# Patient Record
Sex: MALE | NOT HISPANIC OR LATINO
[De-identification: names, ages, dates, MRNs, and addresses within clinical notes are randomized per-mention and may not be internally consistent; named-entity substitution may affect disease eponyms.]

---

## 2018-02-23 ENCOUNTER — TRANSCRIPTION ENCOUNTER (OUTPATIENT)
Age: 68
End: 2018-02-23

## 2018-04-05 ENCOUNTER — OUTPATIENT (OUTPATIENT)
Dept: OUTPATIENT SERVICES | Facility: HOSPITAL | Age: 68
LOS: 1 days | Discharge: HOME | End: 2018-04-05

## 2018-04-05 ENCOUNTER — RESULT REVIEW (OUTPATIENT)
Age: 68
End: 2018-04-05

## 2018-04-05 ENCOUNTER — APPOINTMENT (OUTPATIENT)
Dept: HEMATOLOGY ONCOLOGY | Facility: CLINIC | Age: 68
End: 2018-04-05
Payer: MEDICARE

## 2018-04-05 VITALS
OXYGEN SATURATION: 97 % | WEIGHT: 193.12 LBS | HEART RATE: 88 BPM | BODY MASS INDEX: 27.96 KG/M2 | HEIGHT: 69.61 IN | RESPIRATION RATE: 16 BRPM | DIASTOLIC BLOOD PRESSURE: 88 MMHG | SYSTOLIC BLOOD PRESSURE: 145 MMHG

## 2018-04-05 DIAGNOSIS — Z86.79 PERSONAL HISTORY OF OTHER DISEASES OF THE CIRCULATORY SYSTEM: ICD-10-CM

## 2018-04-05 DIAGNOSIS — Z80.3 FAMILY HISTORY OF MALIGNANT NEOPLASM OF BREAST: ICD-10-CM

## 2018-04-05 DIAGNOSIS — Z87.891 PERSONAL HISTORY OF NICOTINE DEPENDENCE: ICD-10-CM

## 2018-04-05 DIAGNOSIS — Z87.2 PERSONAL HISTORY OF DISEASES OF THE SKIN AND SUBCUTANEOUS TISSUE: ICD-10-CM

## 2018-04-05 DIAGNOSIS — R93.8 ABNORMAL FINDINGS ON DIAGNOSTIC IMAGING OF OTHER SPECIFIED BODY STRUCTURES: ICD-10-CM

## 2018-04-05 DIAGNOSIS — C18.9 MALIGNANT NEOPLASM OF COLON, UNSPECIFIED: ICD-10-CM

## 2018-04-05 DIAGNOSIS — Z86.39 PERSONAL HISTORY OF OTHER ENDOCRINE, NUTRITIONAL AND METABOLIC DISEASE: ICD-10-CM

## 2018-04-05 PROBLEM — Z00.00 ENCOUNTER FOR PREVENTIVE HEALTH EXAMINATION: Status: ACTIVE | Noted: 2018-04-05

## 2018-04-05 LAB
BASOPHILS # BLD AUTO: 0 K/UL — SIGNIFICANT CHANGE UP (ref 0–0.2)
BASOPHILS NFR BLD AUTO: 0.4 % — SIGNIFICANT CHANGE UP (ref 0–2)
EOSINOPHIL # BLD AUTO: 0.1 K/UL — SIGNIFICANT CHANGE UP (ref 0–0.5)
EOSINOPHIL NFR BLD AUTO: 0.9 % — SIGNIFICANT CHANGE UP (ref 0–6)
HCT VFR BLD CALC: 37.7 % — LOW (ref 39–50)
HGB BLD-MCNC: 12.7 G/DL — LOW (ref 13–17)
LYMPHOCYTES # BLD AUTO: 1.5 K/UL — SIGNIFICANT CHANGE UP (ref 1–3.3)
LYMPHOCYTES # BLD AUTO: 13.6 % — SIGNIFICANT CHANGE UP (ref 13–44)
MCHC RBC-ENTMCNC: 30.8 PG — SIGNIFICANT CHANGE UP (ref 27–34)
MCHC RBC-ENTMCNC: 33.8 G/DL — SIGNIFICANT CHANGE UP (ref 32–36)
MCV RBC AUTO: 91.2 FL — SIGNIFICANT CHANGE UP (ref 80–100)
MONOCYTES # BLD AUTO: 1.1 K/UL — HIGH (ref 0–0.9)
MONOCYTES NFR BLD AUTO: 9.7 % — SIGNIFICANT CHANGE UP (ref 2–14)
NEUTROPHILS # BLD AUTO: 8.4 K/UL — HIGH (ref 1.8–7.4)
NEUTROPHILS NFR BLD AUTO: 75.4 % — SIGNIFICANT CHANGE UP (ref 43–77)
PLATELET # BLD AUTO: 236 K/UL — SIGNIFICANT CHANGE UP (ref 150–400)
RBC # BLD: 4.13 M/UL — LOW (ref 4.2–5.8)
RBC # FLD: 12.5 % — SIGNIFICANT CHANGE UP (ref 10.3–14.5)
WBC # BLD: 11.1 K/UL — HIGH (ref 3.8–10.5)
WBC # FLD AUTO: 11.1 K/UL — HIGH (ref 3.8–10.5)

## 2018-04-05 PROCEDURE — 99205 OFFICE O/P NEW HI 60 MIN: CPT

## 2018-04-05 RX ORDER — ONDANSETRON 4 MG/1
4 TABLET ORAL
Qty: 120 | Refills: 0 | Status: ACTIVE | COMMUNITY
Start: 2018-04-05 | End: 1900-01-01

## 2018-04-05 RX ORDER — AMLODIPINE BESYLATE 5 MG/1
5 TABLET ORAL DAILY
Qty: 30 | Refills: 0 | Status: ACTIVE | COMMUNITY
Start: 2018-04-05

## 2018-04-05 RX ORDER — DOXAZOSIN 4 MG/1
4 TABLET ORAL DAILY
Qty: 30 | Refills: 0 | Status: ACTIVE | COMMUNITY
Start: 2018-04-05

## 2018-04-05 RX ORDER — METOPROLOL TARTRATE 25 MG/1
25 TABLET, FILM COATED ORAL TWICE DAILY
Qty: 60 | Refills: 0 | Status: ACTIVE | COMMUNITY
Start: 2018-04-05

## 2018-04-05 RX ORDER — ASPIRIN 81 MG/1
81 TABLET, CHEWABLE ORAL DAILY
Qty: 30 | Refills: 0 | Status: ACTIVE | COMMUNITY
Start: 2018-04-05

## 2018-04-05 RX ORDER — LOSARTAN POTASSIUM 50 MG/1
50 TABLET, FILM COATED ORAL DAILY
Qty: 30 | Refills: 0 | Status: ACTIVE | COMMUNITY
Start: 2018-04-05

## 2018-04-06 LAB
AFP-TM SERPL-MCNC: 1.3 NG/ML
APTT BLD: 31.3 SEC
HBV CORE IGG+IGM SER QL: NONREACTIVE
HBV CORE IGM SER QL: NONREACTIVE
HBV DNA # SERPL NAA+PROBE: NOT DETECTED IU/ML
HBV SURFACE AB SER QL: NONREACTIVE
HCV AB SER QL: NONREACTIVE
HCV S/CO RATIO: 0.17 S/CO
HEPB DNA PCR LOG: NOT DETECTED LOGIU/ML
INR PPP: 1.03 RATIO
PT BLD: 11.6 SEC

## 2018-04-09 PROBLEM — R93.8 ABNORMAL CHEST CT: Status: ACTIVE | Noted: 2018-04-05

## 2018-04-10 ENCOUNTER — APPOINTMENT (OUTPATIENT)
Dept: NUCLEAR MEDICINE | Facility: CLINIC | Age: 68
End: 2018-04-10

## 2018-04-10 ENCOUNTER — APPOINTMENT (OUTPATIENT)
Dept: CT IMAGING | Facility: CLINIC | Age: 68
End: 2018-04-10

## 2018-04-10 ENCOUNTER — OUTPATIENT (OUTPATIENT)
Dept: OUTPATIENT SERVICES | Facility: HOSPITAL | Age: 68
LOS: 1 days | End: 2018-04-10
Payer: MEDICARE

## 2018-04-10 DIAGNOSIS — Z00.8 ENCOUNTER FOR OTHER GENERAL EXAMINATION: ICD-10-CM

## 2018-04-10 LAB
HBV E AB SER QL: NEGATIVE
HBV E AG SER QL: NEGATIVE
HBV SURFACE AG SER QL: NONREACTIVE
HCV RNA SERPL NAA DL=5-ACNC: NOT DETECTED IU/ML
HCV RNA SERPL NAA+PROBE-LOG IU: NOT DETECTED LOGIU/ML

## 2018-04-10 PROCEDURE — 78815 PET IMAGE W/CT SKULL-THIGH: CPT | Mod: 26,PI

## 2018-04-10 PROCEDURE — A9552: CPT

## 2018-04-10 PROCEDURE — 74177 CT ABD & PELVIS W/CONTRAST: CPT | Mod: 26

## 2018-04-10 PROCEDURE — 78815 PET IMAGE W/CT SKULL-THIGH: CPT

## 2018-04-10 PROCEDURE — 74177 CT ABD & PELVIS W/CONTRAST: CPT

## 2018-04-10 PROCEDURE — 82565 ASSAY OF CREATININE: CPT

## 2018-04-14 ENCOUNTER — INPATIENT (INPATIENT)
Facility: HOSPITAL | Age: 68
LOS: 4 days | Discharge: HOME | DRG: 357 | End: 2018-04-19
Attending: INTERNAL MEDICINE | Admitting: INTERNAL MEDICINE
Payer: MEDICARE

## 2018-04-14 VITALS
TEMPERATURE: 98 F | DIASTOLIC BLOOD PRESSURE: 77 MMHG | WEIGHT: 195.11 LBS | HEART RATE: 79 BPM | SYSTOLIC BLOOD PRESSURE: 135 MMHG | RESPIRATION RATE: 16 BRPM | HEIGHT: 70 IN | OXYGEN SATURATION: 97 %

## 2018-04-14 DIAGNOSIS — Z29.9 ENCOUNTER FOR PROPHYLACTIC MEASURES, UNSPECIFIED: ICD-10-CM

## 2018-04-14 DIAGNOSIS — C18.9 MALIGNANT NEOPLASM OF COLON, UNSPECIFIED: ICD-10-CM

## 2018-04-14 DIAGNOSIS — I10 ESSENTIAL (PRIMARY) HYPERTENSION: ICD-10-CM

## 2018-04-14 DIAGNOSIS — Z98.890 OTHER SPECIFIED POSTPROCEDURAL STATES: Chronic | ICD-10-CM

## 2018-04-14 DIAGNOSIS — K92.2 GASTROINTESTINAL HEMORRHAGE, UNSPECIFIED: ICD-10-CM

## 2018-04-14 LAB
ALBUMIN SERPL ELPH-MCNC: 3.2 G/DL — LOW (ref 3.3–5)
ALP SERPL-CCNC: 329 U/L — HIGH (ref 40–120)
ALT FLD-CCNC: 52 U/L RC — HIGH (ref 10–45)
ANION GAP SERPL CALC-SCNC: 13 MMOL/L — SIGNIFICANT CHANGE UP (ref 5–17)
APTT BLD: 31.9 SEC — SIGNIFICANT CHANGE UP (ref 27.5–37.4)
AST SERPL-CCNC: 82 U/L — HIGH (ref 10–40)
BASOPHILS # BLD AUTO: 0 K/UL — SIGNIFICANT CHANGE UP (ref 0–0.2)
BASOPHILS NFR BLD AUTO: 0.3 % — SIGNIFICANT CHANGE UP (ref 0–2)
BILIRUB SERPL-MCNC: 0.6 MG/DL — SIGNIFICANT CHANGE UP (ref 0.2–1.2)
BLD GP AB SCN SERPL QL: NEGATIVE — SIGNIFICANT CHANGE UP
BUN SERPL-MCNC: 18 MG/DL — SIGNIFICANT CHANGE UP (ref 7–23)
CALCIUM SERPL-MCNC: 8.7 MG/DL — SIGNIFICANT CHANGE UP (ref 8.4–10.5)
CHLORIDE SERPL-SCNC: 100 MMOL/L — SIGNIFICANT CHANGE UP (ref 96–108)
CO2 SERPL-SCNC: 25 MMOL/L — SIGNIFICANT CHANGE UP (ref 22–31)
CREAT SERPL-MCNC: 0.9 MG/DL — SIGNIFICANT CHANGE UP (ref 0.5–1.3)
EOSINOPHIL # BLD AUTO: 0.1 K/UL — SIGNIFICANT CHANGE UP (ref 0–0.5)
EOSINOPHIL NFR BLD AUTO: 0.5 % — SIGNIFICANT CHANGE UP (ref 0–6)
GLUCOSE SERPL-MCNC: 172 MG/DL — HIGH (ref 70–99)
HCT VFR BLD CALC: 34.5 % — LOW (ref 39–50)
HCT VFR BLD CALC: 36.9 % — LOW (ref 39–50)
HGB BLD-MCNC: 11.3 G/DL — LOW (ref 13–17)
HGB BLD-MCNC: 11.9 G/DL — LOW (ref 13–17)
INR BLD: 1.13 RATIO — SIGNIFICANT CHANGE UP (ref 0.88–1.16)
LYMPHOCYTES # BLD AUTO: 1.5 K/UL — SIGNIFICANT CHANGE UP (ref 1–3.3)
LYMPHOCYTES # BLD AUTO: 11 % — LOW (ref 13–44)
MCHC RBC-ENTMCNC: 29.7 PG — SIGNIFICANT CHANGE UP (ref 27–34)
MCHC RBC-ENTMCNC: 30.1 PG — SIGNIFICANT CHANGE UP (ref 27–34)
MCHC RBC-ENTMCNC: 32.2 GM/DL — SIGNIFICANT CHANGE UP (ref 32–36)
MCHC RBC-ENTMCNC: 32.8 GM/DL — SIGNIFICANT CHANGE UP (ref 32–36)
MCV RBC AUTO: 91.7 FL — SIGNIFICANT CHANGE UP (ref 80–100)
MCV RBC AUTO: 92.3 FL — SIGNIFICANT CHANGE UP (ref 80–100)
MONOCYTES # BLD AUTO: 0.9 K/UL — SIGNIFICANT CHANGE UP (ref 0–0.9)
MONOCYTES NFR BLD AUTO: 6.6 % — SIGNIFICANT CHANGE UP (ref 2–14)
NEUTROPHILS # BLD AUTO: 10.9 K/UL — HIGH (ref 1.8–7.4)
NEUTROPHILS NFR BLD AUTO: 81.6 % — HIGH (ref 43–77)
PLATELET # BLD AUTO: 244 K/UL — SIGNIFICANT CHANGE UP (ref 150–400)
PLATELET # BLD AUTO: 256 K/UL — SIGNIFICANT CHANGE UP (ref 150–400)
POTASSIUM SERPL-MCNC: 4.6 MMOL/L — SIGNIFICANT CHANGE UP (ref 3.5–5.3)
POTASSIUM SERPL-SCNC: 4.6 MMOL/L — SIGNIFICANT CHANGE UP (ref 3.5–5.3)
PROT SERPL-MCNC: 6.4 G/DL — SIGNIFICANT CHANGE UP (ref 6–8.3)
PROTHROM AB SERPL-ACNC: 12.4 SEC — SIGNIFICANT CHANGE UP (ref 9.8–12.7)
RBC # BLD: 3.76 M/UL — LOW (ref 4.2–5.8)
RBC # BLD: 4 M/UL — LOW (ref 4.2–5.8)
RBC # FLD: 12.5 % — SIGNIFICANT CHANGE UP (ref 10.3–14.5)
RBC # FLD: 12.6 % — SIGNIFICANT CHANGE UP (ref 10.3–14.5)
RH IG SCN BLD-IMP: POSITIVE — SIGNIFICANT CHANGE UP
SODIUM SERPL-SCNC: 138 MMOL/L — SIGNIFICANT CHANGE UP (ref 135–145)
WBC # BLD: 12.8 K/UL — HIGH (ref 3.8–10.5)
WBC # BLD: 13.4 K/UL — HIGH (ref 3.8–10.5)
WBC # FLD AUTO: 12.8 K/UL — HIGH (ref 3.8–10.5)
WBC # FLD AUTO: 13.4 K/UL — HIGH (ref 3.8–10.5)

## 2018-04-14 PROCEDURE — 99285 EMERGENCY DEPT VISIT HI MDM: CPT | Mod: GC

## 2018-04-14 PROCEDURE — 99223 1ST HOSP IP/OBS HIGH 75: CPT

## 2018-04-14 PROCEDURE — 71045 X-RAY EXAM CHEST 1 VIEW: CPT | Mod: 26

## 2018-04-14 RX ORDER — METOPROLOL TARTRATE 50 MG
50 TABLET ORAL DAILY
Qty: 0 | Refills: 0 | Status: DISCONTINUED | OUTPATIENT
Start: 2018-04-14 | End: 2018-04-19

## 2018-04-14 RX ORDER — PANTOPRAZOLE SODIUM 20 MG/1
40 TABLET, DELAYED RELEASE ORAL EVERY 12 HOURS
Qty: 0 | Refills: 0 | Status: DISCONTINUED | OUTPATIENT
Start: 2018-04-14 | End: 2018-04-19

## 2018-04-14 RX ORDER — SODIUM CHLORIDE 9 MG/ML
1000 INJECTION INTRAMUSCULAR; INTRAVENOUS; SUBCUTANEOUS ONCE
Qty: 0 | Refills: 0 | Status: COMPLETED | OUTPATIENT
Start: 2018-04-14 | End: 2018-04-14

## 2018-04-14 RX ADMIN — SODIUM CHLORIDE 1000 MILLILITER(S): 9 INJECTION INTRAMUSCULAR; INTRAVENOUS; SUBCUTANEOUS at 14:03

## 2018-04-14 RX ADMIN — PANTOPRAZOLE SODIUM 40 MILLIGRAM(S): 20 TABLET, DELAYED RELEASE ORAL at 21:33

## 2018-04-14 NOTE — H&P ADULT - NSHPLABSRESULTS_GEN_ALL_CORE
Labs reviewed : Hb stable , leukocytosis, BMP is wnl, elevated transminases    CXR  personally reviewed : No focal consolidation    EKG personally reviewed :     CT Abd/pelvis 4/10/18: Colonic mass with extensive metastatic disease  BOWEL: No bowel obstruction. Sigmoid diverticulosis. Circumferential mass   at the splenic flexure corresponding with patient's primary neoplasm.   LIVER: Extensive bilateral lobar hepatic metastatic disease. A discrete   lesion in the right hepatic lobe (2:45) 4.4 x 3.8 cm.  LOWER CHEST: Pulmonary metastatic disease. A reference lesion in the   right lower lobe (2:22) 3.2 x 2.7 cm. Coronary calcification.    PET CT 4/10/18 :   1.  FDG avid heterogeneous mural thickening distal transverse   colon/splenic flexure probably represents primary malignancy.    2.  Widespread FDG avid hepatic lesions, retroperitoneal and mesenteric   lymphadenopathy and peritoneal nodules consistent with metastatic disease.    3.  Small FDG avid foci in the pelvic bones and ribs, suspicious for   osseous metastatic disease.    4.  FDG avid bilateral pulmonary nodules and mediastinal/hilar   lymphadenopathy, probably metastatic. Heterogeneous interstitial   prominence and groundglass opacities are nonspecific, possibly   inflammatory/infectious, however lymphangitic metastatic disease is   possible and incompletely evaluated due to nonbreath-hold technique. Labs reviewed : Hb stable , leukocytosis, BMP is wnl, elevated transaminases    CXR  personally reviewed : No focal consolidation    EKG personally reviewed : NSR, no acute ischemic changes    CT Abd/pelvis 4/10/18: Colonic mass with extensive metastatic disease  BOWEL: No bowel obstruction. Sigmoid diverticulosis. Circumferential mass   at the splenic flexure corresponding with patient's primary neoplasm.   LIVER: Extensive bilateral lobar hepatic metastatic disease. A discrete   lesion in the right hepatic lobe (2:45) 4.4 x 3.8 cm.  LOWER CHEST: Pulmonary metastatic disease. A reference lesion in the   right lower lobe (2:22) 3.2 x 2.7 cm. Coronary calcification.    PET CT 4/10/18 :   1.  FDG avid heterogeneous mural thickening distal transverse   colon/splenic flexure probably represents primary malignancy.    2.  Widespread FDG avid hepatic lesions, retroperitoneal and mesenteric   lymphadenopathy and peritoneal nodules consistent with metastatic disease.    3.  Small FDG avid foci in the pelvic bones and ribs, suspicious for   osseous metastatic disease.    4.  FDG avid bilateral pulmonary nodules and mediastinal/hilar   lymphadenopathy, probably metastatic. Heterogeneous interstitial   prominence and groundglass opacities are nonspecific, possibly   inflammatory/infectious, however lymphangitic metastatic disease is   possible and incompletely evaluated due to nonbreath-hold technique.

## 2018-04-14 NOTE — ED ADULT NURSE NOTE - OBJECTIVE STATEMENT
Patient presents to Ed with c/o bloody stools. Pt reports diagnosis of malignant mass in colon and has been experiencing  bloody stools x 3 days. Pt A&Ox3 denies pain, no chest pain sob nausea vomiting +bloody stool, no fever chills headache  or dizziness. Lungs cta bilat, abd soft non tender non distended, skin warm dry intact.

## 2018-04-14 NOTE — H&P ADULT - PROBLEM SELECTOR PLAN 3
pt is on Toprol XL 50mg qd, Doxazosin 4mg qpm, Losartan 50mg qd at home  will start Toprol XL for now  Hold other BP meds pt is on Toprol XL 50mg qd, Doxazosin 4mg qpm, Losartan 50mg qd at home  will start Toprol XL for now  Hold other BP meds in light of GI bleed

## 2018-04-14 NOTE — ED PROVIDER NOTE - MEDICAL DECISION MAKING DETAILS
Pt with metastatic mass colon with gi bleedding. Check labs./hct,vbg/guiac probable admit  Kedar Roberts MD, Facep

## 2018-04-14 NOTE — H&P ADULT - ASSESSMENT
67M with h/o HTN, HLD, Psoriasis , recently diagnosed metastatic Colon CA who presents with c/o bright red blood per rectum since yesterday 67M with h/o HTN, HLD, Psoriasis , recently diagnosed metastatic Colon CA who presents with c/o bright red blood per rectum since yesterday. Physical exam notable for abd bloating. Labs are notable for  stable Hb , leukocytosis and elevated transminases. Recent outpatient CT Abd/Pelvis and PET CT show colonic mass with extensive metastatic disease. 67M with h/o HTN, HLD, Psoriasis , recently diagnosed metastatic Colon CA who presents with c/o bright red blood per rectum since yesterday. Physical exam notable for abd bloating. Labs are notable for  stable Hb , leukocytosis and elevated transaminases. Recent outpatient CT Abd/Pelvis and PET CT show colonic mass with extensive metastatic disease.

## 2018-04-14 NOTE — ED PROVIDER NOTE - OBJECTIVE STATEMENT
68 y/o M w/ hx of HTN and Hyperchol, recently diagnosed colonic mass suggestive of colon CA w/ biopsy confirming diagnosis p/w blood stools, 3 episodes since yesterday. No abdom pain, n/v, fever, or chills. No dizziness, CP, SOB, or palpitations.   primary care physician: [facility]

## 2018-04-14 NOTE — ED PROVIDER NOTE - ATTENDING CONTRIBUTION TO CARE
Private Physician Luis (onc),  [facility]  67y male pmh HTN, Had ct done for complains of cough/abd pain. CT mass in upper colon., Pet scan 5d ago confirmed mass, chest/liver/colon. PT now comes to ed complains of rectal bleeding, Diarreha with blood in bowl twice since last night. Spoke to pmd and referred to ed. Private Physician Luis (onc),  [facility]  67y male pmh HTN, Had ct done for complains of cough/abd pain. CT mass in upper colon., Pet scan 5d ago confirmed mass, chest/liver/colon. PT now comes to ed complains of rectal bleeding, Diarreha with blood in bowl twice since last night. Spoke to pmd and referred to ed. Pe WDWN male nad normocephalic atraumatic chest clear  anterior & posterior abd soft +bs cv no rmg, neuro no focal defects.  Kedar Roberts MD, Facep

## 2018-04-14 NOTE — ED PROVIDER NOTE - PROGRESS NOTE DETAILS
GI called for consult Patient admitted by prior physician.  CXR still outstanding; now performed, shows no consolidation.  ECG also performed (ordered by inpatient team), and shows NSR without st elevations. -Sumeet

## 2018-04-14 NOTE — H&P ADULT - PROBLEM SELECTOR PLAN 2
recently diagnosed  with extensive metastatic disease. recently diagnosed  with extensive metastatic disease.  Hem- Onc consult in AM

## 2018-04-14 NOTE — H&P ADULT - HISTORY OF PRESENT ILLNESS
67M with h/o HTN, HLD, Psoriasis , recently diagnosed metastatic Colon CA who presents with c/o bright red blood per rectum since yesterday. Pt reports that he had a bowel movement in the morning with bright red blood mixed with stool. The second bowel movement consisted of stool only but the third was stool with joe blood again. He had another episode this morning though smaller in quantity. He denies melena, hematemesis, abd pain , nausea, diarrhea. He does c/o constipation abd bloating. He denies NSAID use. Pt also denies CP, SOB, dizziness, palpitations.  Of note pt was recently diagnosed with colon CA with diffuse pulmonary and hepatic metastasis.     ED course  VS : 135/77  79  16 O2 97% on room air T 97.5F  Labs : h/h 11.9/36.9 ( h/h 12.7/37.7 on April 5) wbc 13.4  bun/cr 18/0.9    AST 82  ALT 52  Treatment : IVF NS 1L x 1

## 2018-04-14 NOTE — H&P ADULT - PROBLEM SELECTOR PLAN 1
pt with bright red blood per rectum  Hb stable   CBC q 6  Type and screen  start Protonix 40mg IVP q12  GI consult called

## 2018-04-15 LAB
HCT VFR BLD CALC: 35.6 % — LOW (ref 39–50)
HCT VFR BLD CALC: 38.5 % — LOW (ref 39–50)
HGB BLD-MCNC: 11.6 G/DL — LOW (ref 13–17)
HGB BLD-MCNC: 13 G/DL — SIGNIFICANT CHANGE UP (ref 13–17)
MCHC RBC-ENTMCNC: 28.9 PG — SIGNIFICANT CHANGE UP (ref 27–34)
MCHC RBC-ENTMCNC: 30.8 PG — SIGNIFICANT CHANGE UP (ref 27–34)
MCHC RBC-ENTMCNC: 32.6 GM/DL — SIGNIFICANT CHANGE UP (ref 32–36)
MCHC RBC-ENTMCNC: 33.6 GM/DL — SIGNIFICANT CHANGE UP (ref 32–36)
MCV RBC AUTO: 88.8 FL — SIGNIFICANT CHANGE UP (ref 80–100)
MCV RBC AUTO: 91.5 FL — SIGNIFICANT CHANGE UP (ref 80–100)
PLATELET # BLD AUTO: 255 K/UL — SIGNIFICANT CHANGE UP (ref 150–400)
PLATELET # BLD AUTO: 273 K/UL — SIGNIFICANT CHANGE UP (ref 150–400)
RBC # BLD: 4.01 M/UL — LOW (ref 4.2–5.8)
RBC # BLD: 4.21 M/UL — SIGNIFICANT CHANGE UP (ref 4.2–5.8)
RBC # FLD: 12.6 % — SIGNIFICANT CHANGE UP (ref 10.3–14.5)
RBC # FLD: 14.2 % — SIGNIFICANT CHANGE UP (ref 10.3–14.5)
WBC # BLD: 13.31 K/UL — HIGH (ref 3.8–10.5)
WBC # BLD: 14.3 K/UL — HIGH (ref 3.8–10.5)
WBC # FLD AUTO: 13.31 K/UL — HIGH (ref 3.8–10.5)
WBC # FLD AUTO: 14.3 K/UL — HIGH (ref 3.8–10.5)

## 2018-04-15 PROCEDURE — 99222 1ST HOSP IP/OBS MODERATE 55: CPT | Mod: GC

## 2018-04-15 PROCEDURE — 99233 SBSQ HOSP IP/OBS HIGH 50: CPT

## 2018-04-15 PROCEDURE — 99223 1ST HOSP IP/OBS HIGH 75: CPT | Mod: GC

## 2018-04-15 RX ORDER — SOD SULF/SODIUM/NAHCO3/KCL/PEG
1000 SOLUTION, RECONSTITUTED, ORAL ORAL
Qty: 0 | Refills: 0 | Status: COMPLETED | OUTPATIENT
Start: 2018-04-15 | End: 2018-04-15

## 2018-04-15 RX ADMIN — Medication 1000 MILLILITER(S): at 20:28

## 2018-04-15 RX ADMIN — PANTOPRAZOLE SODIUM 40 MILLIGRAM(S): 20 TABLET, DELAYED RELEASE ORAL at 17:28

## 2018-04-15 RX ADMIN — PANTOPRAZOLE SODIUM 40 MILLIGRAM(S): 20 TABLET, DELAYED RELEASE ORAL at 05:56

## 2018-04-15 RX ADMIN — Medication 50 MILLIGRAM(S): at 05:56

## 2018-04-15 RX ADMIN — Medication 1000 MILLILITER(S): at 17:27

## 2018-04-15 NOTE — PROGRESS NOTE ADULT - PROBLEM SELECTOR PLAN 2
recently diagnosed  with extensive metastatic disease.  Hem- Onc consult in AM recently diagnosed  with extensive metastatic disease.  Hem- Onc consult pending

## 2018-04-15 NOTE — PROGRESS NOTE ADULT - PROBLEM SELECTOR PLAN 3
pt is on Toprol XL 50mg qd, Doxazosin 4mg qpm, Losartan 50mg qd at home  will start Toprol XL for now  Hold other BP meds in light of GI bleed BP well controlled   c/w Toprol XL 50mg PO qd  Hold other BP meds in light of GI bleed

## 2018-04-15 NOTE — CONSULT NOTE ADULT - ASSESSMENT
67 year old male with HTN, HLD, psoriasis, recently diagnosed metastatic colon carcinoma presented to the emergency room with chief complain of bright red blood per rectum for one day.     Impression:   - Hematochezia: likely 2/2 colon cancer  - Metastatic colon cancer, unknown pathology    Plan:  - trend CBC, CMP  - plan for flexible sigmoidoscopy on Monday 4/16  - please give two tap water enemas in am on 4/16  - clear liquid diet today, NPO after midnight   - recommend oncology consult  - supportive care as per primary team    GI team will continue to follow.  Thank you for the consult. 67 year old male with HTN, HLD, psoriasis, recently diagnosed metastatic colon carcinoma presented to the emergency room with chief complain of bright red blood per rectum for one day.     Impression:   - Hematochezia: likely 2/2 colon cancer  - Metastatic colon cancer, unknown pathology  - Elevated liver enzymes, likely 2/2 metastatic lesions in the liver     Plan:  - trend CBC, CMP  - plan for flexible sigmoidoscopy on Monday 4/16  - please give two tap water enemas in am on 4/16  - clear liquid diet today, NPO after midnight   - recommend oncology consult  - supportive care as per primary team    GI team will continue to follow.  Thank you for the consult. 67 year old male with HTN, HLD, psoriasis, recently diagnosed metastatic colon carcinoma presented to the emergency room with chief complain of bright red blood per rectum for one day.     Impression:   - Hematochezia: likely 2/2 colon cancer  - Metastatic colon cancer, unknown pathology  - Elevated liver enzymes, likely 2/2 metastatic lesions in the liver     Plan:  - trend CBC, CMP  - Plan for colonoscopy tomorrow, with 2 L Moviprep (we will write)  - clear liquid diet today, NPO after midnight   - recommend oncology consult  - supportive care as per primary team    GI team will continue to follow.  Thank you for the consult. 67 year old male with HTN, HLD, psoriasis, recently diagnosed colon mass, along with liver and lung masses (dx by imaging CT PET, mets to lung, liver with likely primary colon mass) presented to the emergency room with chief complain of bright red blood per rectum for one day.     Impression:   - Hematochezia: likely 2/2 colon mass  - Metastatic colon cancer, unknown pathology  - Elevated liver enzymes, likely 2/2 metastatic lesions in the liver     Plan:  - trend CBC, CMP  - Plan for colonoscopy tomorrow, with 2 L Moviprep (we will write)  - clear liquid diet today, NPO after midnight   - recommend oncology consult  - supportive care as per primary team    GI team will continue to follow.  Thank you for the consult.

## 2018-04-15 NOTE — PROGRESS NOTE ADULT - ASSESSMENT
67M with h/o HTN, HLD, Psoriasis , recently diagnosed metastatic Colon CA who presents with c/o bright red blood per rectum since yesterday. Physical exam notable for abd bloating. Labs are notable for  stable Hb , leukocytosis and elevated transaminases. Recent outpatient CT Abd/Pelvis and PET CT show colonic mass with extensive metastatic disease.

## 2018-04-15 NOTE — PROGRESS NOTE ADULT - PROBLEM SELECTOR PLAN 1
pt with bright red blood per rectum  Hb stable   CBC q 6  Type and screen  start Protonix 40mg IVP q12  GI consult called Hb stable   CBC q 12  c/w Protonix 40mg IVP q12  GI consult pending

## 2018-04-15 NOTE — CONSULT NOTE ADULT - SUBJECTIVE AND OBJECTIVE BOX
Chief Complaint:  Patient is a 67y old  Male who presents with a chief complaint of bright red blood per rectum (14 Apr 2018 16:15)      HPI:  67 year old male with HTN, HLD, psoriasis, recently diagnosed metastatic colon carcinoma presented to the emergency room with chief complain of bright red blood per rectum for one day.     As per the patient and his wife, he had a bowel movement in the morning yesterday with bright red blood mixed with stool. The second bowel movement consisted of stool only but the third was stool with joe blood again. He denies melena, hematemesis, abdominal pain, nausea, diarrhea. He does usually have constipation bloating. He denies NSAID use. Pt also denies CP, SOB, dizziness, palpitations.    Of note pt was recently diagnosed with colon CA with diffuse pulmonary and hepatic metastasis. He has not had a colonoscopy.       Allergies:  sulfa drugs (Unknown)      Home Medications:   Outpatient Medication Status not yet specified    Hospital Medications:  metoprolol succinate ER 50 milliGRAM(s) Oral daily  pantoprazole  Injectable 40 milliGRAM(s) IV Push every 12 hours      PMHX/PSHX:  Malignant neoplasm of colon, unspecified part of colon  HTN (hypertension)  History of tonsillectomy      Family history:  Family history of breast cancer in mother (Mother)      Social History:   	Lives with spouse  	Daily alcohol use  	Former smoker   Denies illicit drugs    ROS:     General:  No wt loss, fevers, chills, night sweats, fatigue,   Eyes:  Good vision, no reported pain  ENT:  No sore throat, pain, runny nose, dysphagia  CV:  No pain, palpitations, hypo/hypertension  Resp:  No dyspnea, cough, tachypnea, wheezing  GI:  See HPI  :  No pain, bleeding, incontinence, nocturia  Muscle:  No pain, weakness  Neuro:  No weakness, tingling, memory problems  Psych:  No fatigue, insomnia, mood problems, depression  Endocrine:  No polyuria, polydipsia, cold/heat intolerance  Heme:  No petechiae, ecchymosis, easy bruisability  Skin:  No rash, edema      PHYSICAL EXAM:     GENERAL:  Appears stated age, well-groomed, well-nourished, no distress  HEENT:  NC/AT,  conjunctivae clear and pink,  no JVD  CHEST:  Full & symmetric excursion, no increased effort, breath sounds clear  HEART:  Regular rhythm, S1, S2, no murmur/rub/S3/S4, no abdominal bruit, no edema  ABDOMEN:  Soft, non-tender, non-distended, normoactive bowel sounds,  no masses ,  EXTREMITIES:  no cyanosis,clubbing or edema  SKIN:  No rash/erythema/ecchymoses/petechiae/wounds/abscess/warm/dry  NEURO:  Alert, oriented    Vital Signs:  Vital Signs Last 24 Hrs  T(C): 37.3 (15 Apr 2018 04:29), Max: 37.3 (15 Apr 2018 04:29)  T(F): 99.1 (15 Apr 2018 04:29), Max: 99.1 (15 Apr 2018 04:29)  HR: 85 (15 Apr 2018 04:29) (73 - 89)  BP: 155/86 (15 Apr 2018 04:29) (135/77 - 158/87)  BP(mean): 96 (14 Apr 2018 16:15) (96 - 96)  RR: 18 (15 Apr 2018 04:29) (16 - 18)  SpO2: 95% (15 Apr 2018 04:29) (94% - 97%)  Daily Height in cm: 177.8 (14 Apr 2018 18:35)    Daily     LABS:                        11.6   13.31 )-----------( 255      ( 15 Apr 2018 09:15 )             35.6     04-14    138  |  100  |  18  ----------------------------<  172<H>  4.6   |  25  |  0.90    Ca    8.7      14 Apr 2018 14:16    TPro  6.4  /  Alb  3.2<L>  /  TBili  0.6  /  DBili  x   /  AST  82<H>  /  ALT  52<H>  /  AlkPhos  329<H>  04-14    LIVER FUNCTIONS - ( 14 Apr 2018 14:16 )  Alb: 3.2 g/dL / Pro: 6.4 g/dL / ALK PHOS: 329 U/L / ALT: 52 U/L RC / AST: 82 U/L / GGT: x           PT/INR - ( 14 Apr 2018 14:16 )   PT: 12.4 sec;   INR: 1.13 ratio         PTT - ( 14 Apr 2018 14:16 )  PTT:31.9 sec        Imaging:  < from: CT Abdomen and Pelvis w/ Oral Cont and w/ IV Cont (04.10.18 @ 15:37) >  LOWER CHEST: Pulmonary metastatic disease. A reference lesion in the   right lower lobe (2:22) 3.2 x 2.7 cm. Coronary calcification.    LIVER: Extensive bilateral lobar hepatic metastatic disease. A discrete   lesion in the right hepatic lobe (2:45) 4.4 x 3.8 cm.  BILE DUCTS: Normal caliber.  GALLBLADDER: Contracted.  SPLEEN: Within normal limits.  PANCREAS: Within normal limits.  ADRENALS: Within normal limits.  KIDNEYS/URETERS: Within normal limits.    BLADDER: Within normal limits.  REPRODUCTIVE ORGANS:Enlarged prostate.    BOWEL: No bowel obstruction. Sigmoid diverticulosis. Circumferential mass   at the splenic flexure corresponding with patient's primary neoplasm.   Appendix is normal.  PERITONEUM: Trace ascites.  VESSELS:  Atherosclerotic changes.  RETROPERITONEUM: Extensive retroperitoneal adenopathy. A reference left   para-aortic node (2:44) 3.1 x 2.3 cm    ABDOMINAL WALL: Within normal limits.  BONES: Degenerative changes of the spine. Bilateral hip a vascular   necrosis. No evidence ofcollapse.    IMPRESSION:     Colonic mass with extensive metastatic disease as above.      < end of copied text >      < from: NM PET/CT Onc FDG Skull to Thigh, Inital (04.10.18 @ 15:33) >  IMPRESSION:      1.  FDG avid heterogeneous mural thickening distal transverse   colon/splenic flexure probably represents primary malignancy.    2.  Widespread FDG avid hepatic lesions, retroperitoneal and mesenteric   lymphadenopathy and peritoneal nodules consistent with metastatic disease.    3.  Small FDG avid foci in the pelvic bones and ribs, suspicious for   osseous metastatic disease.    4.  FDG avid bilateral pulmonary nodules and mediastinal/hilar   lymphadenopathy, probably metastatic. Heterogeneous interstitial   prominence and groundglass opacities are nonspecific, possibly   inflammatory/infectious, however lymphangitic metastatic disease is   possible and incompletely evaluated due to nonbreath-hold technique.            < end of copied text > Chief Complaint:  Patient is a 67y old  Male who presents with a chief complaint of bright red blood per rectum (14 Apr 2018 16:15)      HPI:  67 year old male with HTN, HLD, psoriasis, recently diagnosed colon mass, along with liver and lung masses (dx by imaging CT PET, mets to lung, liver with likely primary colon mass) presented to the emergency room with chief complain of bright red blood per rectum for one day.     As per the patient and his wife, he had a bowel movement in the morning yesterday with bright red blood mixed with stool. The second bowel movement consisted of stool only but the third was stool with joe blood again. He denies melena, hematemesis, abdominal pain, nausea, diarrhea. He does usually have constipation bloating. He denies NSAID use. Pt also denies CP, SOB, dizziness, palpitations.  Over the past 6 months, pt has weight loss, appetite suppression, left sided abdominal pains.  There has been a change in stool habits with new constipation. Over the past week, patient has been BRBPR.      Family history negative for colon CA.        Allergies:  sulfa drugs (Unknown)      Home Medications:   Outpatient Medication Status not yet specified    Hospital Medications:  metoprolol succinate ER 50 milliGRAM(s) Oral daily  pantoprazole  Injectable 40 milliGRAM(s) IV Push every 12 hours      PMHX/PSHX:  Malignant neoplasm of colon, unspecified part of colon  HTN (hypertension)  History of tonsillectomy      Family history:  Family history of breast cancer in mother (Mother)      Social History:   	Lives with spouse  	Daily alcohol use  	Former smoker   Denies illicit drugs    ROS:     General:  No wt loss, fevers, chills, night sweats, fatigue,   Eyes:  Good vision, no reported pain  ENT:  No sore throat, pain, runny nose, dysphagia  CV:  No pain, palpitations, hypo/hypertension  Resp:  No dyspnea, cough, tachypnea, wheezing  GI:  See HPI  :  No pain, bleeding, incontinence, nocturia  Muscle:  No pain, weakness  Neuro:  No weakness, tingling, memory problems  Psych:  No fatigue, insomnia, mood problems, depression  Endocrine:  No polyuria, polydipsia, cold/heat intolerance  Heme:  No petechiae, ecchymosis, easy bruisability  Skin:  No rash, edema      PHYSICAL EXAM:     GENERAL:  Appears stated age, well-groomed, well-nourished, no distress  HEENT:  NC/AT,  conjunctivae clear and pink,  no JVD  CHEST:  Full & symmetric excursion, no increased effort, breath sounds clear  HEART:  Regular rhythm, S1, S2, no murmur/rub/S3/S4, no abdominal bruit, no edema  ABDOMEN:  Soft, non-tender, non-distended, normoactive bowel sounds,  no masses ,  EXTREMITIES:  no cyanosis,clubbing or edema  SKIN:  No rash/erythema/ecchymoses/petechiae/wounds/abscess/warm/dry  NEURO:  Alert, oriented    Vital Signs:  Vital Signs Last 24 Hrs  T(C): 37.3 (15 Apr 2018 04:29), Max: 37.3 (15 Apr 2018 04:29)  T(F): 99.1 (15 Apr 2018 04:29), Max: 99.1 (15 Apr 2018 04:29)  HR: 85 (15 Apr 2018 04:29) (73 - 89)  BP: 155/86 (15 Apr 2018 04:29) (135/77 - 158/87)  BP(mean): 96 (14 Apr 2018 16:15) (96 - 96)  RR: 18 (15 Apr 2018 04:29) (16 - 18)  SpO2: 95% (15 Apr 2018 04:29) (94% - 97%)  Daily Height in cm: 177.8 (14 Apr 2018 18:35)    Daily     LABS:                        11.6   13.31 )-----------( 255      ( 15 Apr 2018 09:15 )             35.6     04-14    138  |  100  |  18  ----------------------------<  172<H>  4.6   |  25  |  0.90    Ca    8.7      14 Apr 2018 14:16    TPro  6.4  /  Alb  3.2<L>  /  TBili  0.6  /  DBili  x   /  AST  82<H>  /  ALT  52<H>  /  AlkPhos  329<H>  04-14    LIVER FUNCTIONS - ( 14 Apr 2018 14:16 )  Alb: 3.2 g/dL / Pro: 6.4 g/dL / ALK PHOS: 329 U/L / ALT: 52 U/L RC / AST: 82 U/L / GGT: x           PT/INR - ( 14 Apr 2018 14:16 )   PT: 12.4 sec;   INR: 1.13 ratio         PTT - ( 14 Apr 2018 14:16 )  PTT:31.9 sec        Imaging:  < from: CT Abdomen and Pelvis w/ Oral Cont and w/ IV Cont (04.10.18 @ 15:37) >  LOWER CHEST: Pulmonary metastatic disease. A reference lesion in the   right lower lobe (2:22) 3.2 x 2.7 cm. Coronary calcification.    LIVER: Extensive bilateral lobar hepatic metastatic disease. A discrete   lesion in the right hepatic lobe (2:45) 4.4 x 3.8 cm.  BILE DUCTS: Normal caliber.  GALLBLADDER: Contracted.  SPLEEN: Within normal limits.  PANCREAS: Within normal limits.  ADRENALS: Within normal limits.  KIDNEYS/URETERS: Within normal limits.    BLADDER: Within normal limits.  REPRODUCTIVE ORGANS:Enlarged prostate.    BOWEL: No bowel obstruction. Sigmoid diverticulosis. Circumferential mass   at the splenic flexure corresponding with patient's primary neoplasm.   Appendix is normal.  PERITONEUM: Trace ascites.  VESSELS:  Atherosclerotic changes.  RETROPERITONEUM: Extensive retroperitoneal adenopathy. A reference left   para-aortic node (2:44) 3.1 x 2.3 cm    ABDOMINAL WALL: Within normal limits.  BONES: Degenerative changes of the spine. Bilateral hip a vascular   necrosis. No evidence ofcollapse.    IMPRESSION:     Colonic mass with extensive metastatic disease as above.      < end of copied text >      < from: NM PET/CT Onc FDG Skull to Thigh, Inital (04.10.18 @ 15:33) >  IMPRESSION:      1.  FDG avid heterogeneous mural thickening distal transverse   colon/splenic flexure probably represents primary malignancy.    2.  Widespread FDG avid hepatic lesions, retroperitoneal and mesenteric   lymphadenopathy and peritoneal nodules consistent with metastatic disease.    3.  Small FDG avid foci in the pelvic bones and ribs, suspicious for   osseous metastatic disease.    4.  FDG avid bilateral pulmonary nodules and mediastinal/hilar   lymphadenopathy, probably metastatic. Heterogeneous interstitial   prominence and groundglass opacities are nonspecific, possibly   inflammatory/infectious, however lymphangitic metastatic disease is   possible and incompletely evaluated due to nonbreath-hold technique.            < end of copied text >

## 2018-04-15 NOTE — CONSULT NOTE ADULT - ATTENDING COMMENTS
Patient interviewed/examined.  Agree with history, ROS, PE, A/P as above.    Patient with colon mass and liver masses, likely metastatic colon cancer causing LGIB.    For colonoscopy tomorrow.    Would do anemia work-up to evaluate for iron-deficiency.      Amor Pope MD   [phone]
I have seen and examined this patient with the GI fellow. Agree with above.

## 2018-04-15 NOTE — CONSULT NOTE ADULT - SUBJECTIVE AND OBJECTIVE BOX
Oncology Consult Note    HPI:  67M with h/o HTN, HLD, Psoriasis , recently diagnosed metastatic Colon CA who presents with c/o bright red blood per rectum for the past 2-3 days. Pt reports that he had a bowel movement in the morning with bright red blood mixed with stool. The second bowel movement consisted of stool only but the third was stool with joe blood again. He had another episode this morning though smaller in quantity. He denies melena, hematemesis, abd pain , nausea, diarrhea. He does c/o constipation abd bloating. He denies NSAID use. Pt also denies CP, SOB, dizziness, palpitations.  Of note pt was recently diagnosed with colon CA with diffuse pulmonary and hepatic metastasis. He has not yet had biopsy to confirm pathology.     Allergies    sulfa drugs (Unknown)    Intolerances        MEDICATIONS  (STANDING):  metoprolol succinate ER 50 milliGRAM(s) Oral daily  pantoprazole  Injectable 40 milliGRAM(s) IV Push every 12 hours  polyethylene glycol/electrolyte Solution 1000 milliLiter(s) Oral every 2 hours    MEDICATIONS  (PRN):      PAST MEDICAL & SURGICAL HISTORY:  Malignant neoplasm of colon, unspecified part of colon  HTN (hypertension)  History of tonsillectomy      FAMILY HISTORY:  Family history of breast and stomach cancer in mother (Mother)      SOCIAL HISTORY: former smoker and former alcohol use    REVIEW OF SYSTEMS:    CONSTITUTIONAL: No weakness, fevers or chills  EYES/ENT: No visual changes;  No vertigo or throat pain   NECK: No pain or stiffness  RESPIRATORY: No cough, wheezing, hemoptysis; No shortness of breath  CARDIOVASCULAR: No chest pain or palpitations  GASTROINTESTINAL: + blood in stool. No abdominal or epigastric pain. No nausea, vomiting, or hematemesis; No diarrhea or constipation.   GENITOURINARY: No dysuria, frequency or hematuria  NEUROLOGICAL: No numbness or weakness  SKIN: No itching, burning, rashes, or lesions   All other review of systems is negative unless indicated above.    Height (cm): 177.8 (04-14 @ 18:35)  Weight (kg): 88.5 (04-14 @ 18:35)  BMI (kg/m2): 28 (04-14 @ 18:35)  BSA (m2): 2.07 (04-14 @ 18:35)    T(F): 98.1 (04-15-18 @ 11:54), Max: 99.1 (04-15-18 @ 04:29)  HR: 78 (04-15-18 @ 11:54)  BP: 146/85 (04-15-18 @ 11:54)  RR: 18 (04-15-18 @ 11:54)  SpO2: 95% (04-15-18 @ 11:54)  Wt(kg): --    GENERAL: NAD, well-developed  HEAD:  Atraumatic, Normocephalic  EYES: EOMI, PERRLA, conjunctiva and sclera clear  NECK: Supple, No JVD  CHEST/LUNG: Clear to auscultation bilaterally; No wheeze  HEART: Regular rate and rhythm; No murmurs, rubs, or gallops  ABDOMEN: Soft, Nontender, Nondistended; Bowel sounds present  EXTREMITIES:  2+ Peripheral Pulses, No clubbing, cyanosis, or edema  NEUROLOGY: non-focal  SKIN: No rashes or lesions                          11.6   13.31 )-----------( 255      ( 15 Apr 2018 09:15 )             35.6       04-14    138  |  100  |  18  ----------------------------<  172<H>  4.6   |  25  |  0.90    Ca    8.7      14 Apr 2018 14:16    TPro  6.4  /  Alb  3.2<L>  /  TBili  0.6  /  DBili  x   /  AST  82<H>  /  ALT  52<H>  /  AlkPhos  329<H>  04-14

## 2018-04-15 NOTE — CONSULT NOTE ADULT - ASSESSMENT
67M with h/o HTN, HLD, Psoriasis and being worked up for metastatic colon cancer who presents with c/o bright red blood per rectum for the past 2-3 days:    #Bright red blood per rectum:  - 2/2 underlying malignancy, patient to go for colonoscopy tomorrow  - pt has not had biopsy yet to confirm pathology of suspected malignancy, appreciate biopsy by GI when they perform the colonoscopy  - if biopsy is unobtainable by colonoscopy for some reason, patient does have large liver metastases that could be biopsied by core needle  - hgb currently stable, bleeding has slowed down as per patient  - outpatient follow-up with Dr. Casey to discuss biopsy results and treatment plan 67M with h/o HTN, HLD, Psoriasis and being worked up for metastatic colon cancer who presents with c/o bright red blood per rectum for the past 2-3 days:    #Bright red blood per rectum:  - 2/2 underlying malignancy, patient to go for colonoscopy tomorrow  - pt has not had biopsy yet to confirm pathology of suspected malignancy, appreciate biopsy by GI when they perform the colonoscopy  - as per patient's oncologist, Dr. Casey, please also arrange for core needle biopsy of liver metastasis to which additional molecular studies can be done for possible target-based therapies.   - hgb currently stable, bleeding has slowed down as per patient  - outpatient follow-up with Dr. Casey to discuss biopsy results and treatment plan

## 2018-04-15 NOTE — CONSULT NOTE ADULT - SUBJECTIVE AND OBJECTIVE BOX
SURGICAL ONCOLOGY- [place] TEAM [id number]    CC: Patient is a 67y old male who presents with a chief complaint of bright red blood per rectum (14 Apr 2018 16:15)    Patient is a 67 year old male with HTN, HLD, and recently diagnosed metastatic colon cancer admitted for bright red blood per rectum x 3 days. Pt reports that he had a bowel movement in the morning with bright red blood mixed with stool, no clots. Later that day, he had a bowel movement described as skinny with blood mixed in. The patient presented to the ED where he had another bloody bowel movement. He denies any CP, SOB, abd pain, nausea, vomiting, or diarrhea.     The patient reports that over the past 4 months, he had been sick with the flu and then pneumonia. Reports having some weight loss that was at first intentional, then unintentional. He continue to have fatigue after the pneumonia resolved, with decreased po intake for 2-3 months, and also a change in his bowel habits, having alternating diarrhea and constipation with bloating. Colonoscopy was postponed due to severe bloating and endoscopy was also canceled due to persistent cough. Patient was then referred by PCP to pulmonology, Dr. Julian Steward. Chest CT was ordered, which revealed evidence of the cancer. The patient has since had a PET CT 4/10/2018 showing uptake in the distal transverse colon/splenic flexure, liver, lungs, pelvic bones and ribs, RP and mesenteric LNs.      PMH  Malignant neoplasm of colon, unspecified part of colon  HTN (hypertension)  HLD    PSH  History of tonsillectomy  Cardiac cath 12/2014 @ [facility] for chest pain, negative    MEDS  aspirin 81 mg oral tablet: 1 tab(s) orally once a day (14 Apr 2018 21:21)  doxazosin 4 mg oral tablet: 1 tab(s) orally once a day (in the evening) (14 Apr 2018 21:21)  losartan 100 mg oral tablet: 0.5 tab(s)= 50mg orally once a day in the evening (14 Apr 2018 21:21)  Metoprolol Succinate  mg oral tablet, extended release: 0.5 tab(s)= 50mg  orally once a day in the morning (14 Apr 2018 21:21)  Milk of Magnesia 8% oral suspension: 30 milliliter(s) orally , As Needed (14 Apr 2018 21:21)  rosuvastatin 10 mg oral tablet: 1 tab(s) orally once a day (at bedtime) (14 Apr 2018 21:21)    Allergies  sulfa drugs (Unknown)  Intolerances      Social  Former tobacco, quit 10/2017, 2 cigs/day x 20 years  Former EtOH, 2-3 drinks/day x 30 years, quit in 11/2017  Lives with wife  Still works as private practice architect      Physical Exam  T(C): 37.3 (04-15-18 @ 04:29), Max: 37.3 (04-15-18 @ 04:29)  HR: 85 (04-15-18 @ 04:29) (73 - 89)  BP: 155/86 (04-15-18 @ 04:29) (135/77 - 158/87)  RR: 18 (04-15-18 @ 04:29) (16 - 18)  SpO2: 95% (04-15-18 @ 04:29) (94% - 97%)  Tmax: T(C): , Max: 37.3 (04-15-18 @ 04:29)      Gen: NAD  HEENT: normocephalic, atraumatic, no scleral icterus  CV: S1, S2, RRR  Pulm: CTA B/L  Abd: Soft, ND, NTP, no rebound, no guarding, no palpable organomegaly/masses  Ext: warm, no edema      04-14-18  -  04-15-18  --------------------------------------------------------  IN:    Oral Fluid: 340 mL  Total IN: 340 mL    OUT:  Total OUT: 0 mL    Total NET: 340 mL          Labs:                        11.6   13.31 )-----------( 255      ( 15 Apr 2018 09:15 )             35.6     04-14    138  |  100  |  18  ----------------------------<  172<H>  4.6   |  25  |  0.90    Ca    8.7      14 Apr 2018 14:16    TPro  6.4  /  Alb  3.2<L>  /  TBili  0.6  /  DBili  x   /  AST  82<H>  /  ALT  52<H>  /  AlkPhos  329<H>  04-14    PT/INR - ( 14 Apr 2018 14:16 )   PT: 12.4 sec;   INR: 1.13 ratio         PTT - ( 14 Apr 2018 14:16 )  PTT:31.9 sec          Imaging  < from: NM PET/CT Onc FDG Skull to Thigh, Inital (04.10.18 @ 15:33) >  FINDINGS:      HEAD/NECK: Physiologic FDG activity seen in the visualized portions of   the brain. FDG avid left supraclavicular lymph nodes as a reference 1.7 x   1.1 cm (SUV 18.5; image 71).    THORAX:  Mediastinal and hilar lymphadenopathy. As a reference,   prevascular lymph node, 2.1 x 1.5 cm (SUV 14.7; image 95) and difficult   to delineate right hilar lymph node (SUV 8.6; image 105) No abnormal   avidity.Physiologic FDG activity in the myocardiumand blood pool.      LUNGS: Multiple bilateral pulmonary nodules, the largest of which is in   the right lower lobe measuring 3.1 x 2.5 cm with central photopenia   suggestive of necrosis (SUV 14.3; image 131). Nodule in the left lower   lobe, 1.6 x 1.2 cm (SUV 21.6; image 129). Multiple areas of groundglass   attenuation and interstitial thickening.    PLEURA/PERICARDIUM: No abnormal avidity. No pericardial or pleural   effusions.    HEPATOBILIARY/PANCREAS: Widespread conglomerate FDG avidity throughout   the liver replacing nearly the entire liver parenchyma, difficult to   measure. An area in the posterior segment of the right lobe (SUV 17.3;   image 151) and in the left hepatic lobe (SUV 19.9; image 144).    SPLEEN: No abnormal avidity.    ADRENAL GLANDS: No abnormal avidity.    KIDNEYS/URINARY BLADDER: Excreted activity is seen.   No abnormal avidity.    ABDOMINOPELVIC NODES:   Multiple retroperitoneal, portal and celiac axis   lymph nodes. As a reference, a left periaortic lymph node deep to the   left renal vein, 3.1 x 2.2 cm (SUV 20.1; image 170). Mesenteric lymph   nodes also identified for example in the small bowel mesentery 1.5 x 1.6   cm (SUV 11.3; image 185)    BOWEL/PERITONEUM/MESENTERY:  Heterogeneous mural thickening in the distal   transverse colon/splenic flexure (SUV 16.9; image 166). Sigmoid   diverticulosis.    Peritoneal nodule adjacent to the transverse colonic mass, 1.7 x 1.5 cm   (SUV 5.0; image 189).    PELVIC ORGANS: No abnormal avidity.    BONES:  Scattered FDG avid osseous foci for example, a marrow lesion in   the right iliac bone (SUV 14.5; image 221) and in the left anterior   second rib (SUV 9.1; image 90). Several additional foci in the pelvic   bones are also suspicious.    SOFT TISSUES:  Noabnormal avidity.    IMPRESSION:      1.  FDG avid heterogeneous mural thickening distal transverse   colon/splenic flexure probably represents primary malignancy.    2.  Widespread FDG avid hepatic lesions, retroperitoneal and mesenteric   lymphadenopathy and peritoneal nodules consistent with metastatic disease.    3.  Small FDG avid foci in the pelvic bones and ribs, suspicious for   osseous metastatic disease.    4.  FDG avid bilateral pulmonary nodules and mediastinal/hilar   lymphadenopathy, probably metastatic. Heterogeneous interstitial   prominence and groundglass opacities are nonspecific, possibly   inflammatory/infectious, however lymphangitic metastatic disease is   possible and incompletely evaluated due to nonbreath-hold technique.      < end of copied text > SURGICAL ONCOLOGY- [place] TEAM [id number]    CC: Patient is a 67y old male who presents with a chief complaint of bright red blood per rectum (14 Apr 2018 16:15)    Patient is a 67 year old male with HTN, HLD, and recently diagnosed metastatic colon cancer admitted for bright red blood per rectum x 3 days. Pt reports that he had a bowel movement in the morning with bright red blood mixed with stool, no clots. Later that day, he had a bowel movement described as skinny with blood mixed in. The patient presented to the ED where he had another bloody bowel movement. He denies any CP, SOB, abd pain, nausea, vomiting, or diarrhea.     The patient reports that over the past 4 months, he had been sick with the flu and then pneumonia. Reports having some weight loss that was at first intentional, then unintentional. He continue to have fatigue after the pneumonia resolved, with decreased po intake for 2-3 months, and also a change in his bowel habits, having alternating diarrhea and constipation with bloating. Colonoscopy was postponed due to severe bloating and endoscopy was also canceled due to persistent cough. Patient was then referred by PCP to pulmonology, Dr. Julian Steward. Chest CT was ordered, which revealed evidence of the cancer. The patient has since had a PET CT 4/10/2018 showing uptake in the distal transverse colon/splenic flexure, liver, lungs, pelvic bones and ribs, RP and mesenteric LNs.      PMH  Malignant neoplasm of colon, unspecified part of colon  HTN (hypertension)  HLD    PSH  History of tonsillectomy  Cardiac cath 12/2014 @ [facility] for chest pain, negative    Family History  Mother with breast and gastric cancer      MEDS  aspirin 81 mg oral tablet: 1 tab(s) orally once a day (14 Apr 2018 21:21)  doxazosin 4 mg oral tablet: 1 tab(s) orally once a day (in the evening) (14 Apr 2018 21:21)  losartan 100 mg oral tablet: 0.5 tab(s)= 50mg orally once a day in the evening (14 Apr 2018 21:21)  Metoprolol Succinate  mg oral tablet, extended release: 0.5 tab(s)= 50mg  orally once a day in the morning (14 Apr 2018 21:21)  Milk of Magnesia 8% oral suspension: 30 milliliter(s) orally , As Needed (14 Apr 2018 21:21)  rosuvastatin 10 mg oral tablet: 1 tab(s) orally once a day (at bedtime) (14 Apr 2018 21:21)    Allergies  sulfa drugs (Unknown)  Intolerances      Social  Former tobacco, quit 10/2017, 2 cigs/day x 20 years  Former EtOH, 2-3 drinks/day x 30 years, quit in 11/2017  Lives with wife  Still works as private practice architect      Physical Exam  T(C): 37.3 (04-15-18 @ 04:29), Max: 37.3 (04-15-18 @ 04:29)  HR: 85 (04-15-18 @ 04:29) (73 - 89)  BP: 155/86 (04-15-18 @ 04:29) (135/77 - 158/87)  RR: 18 (04-15-18 @ 04:29) (16 - 18)  SpO2: 95% (04-15-18 @ 04:29) (94% - 97%)  Tmax: T(C): , Max: 37.3 (04-15-18 @ 04:29)      Gen: NAD  HEENT: normocephalic, atraumatic, no scleral icterus  CV: S1, S2, RRR  Pulm: CTA B/L  Abd: Soft, ND, NTP, no rebound, no guarding, no palpable organomegaly/masses  Ext: warm, no edema      04-14-18  -  04-15-18  --------------------------------------------------------  IN:    Oral Fluid: 340 mL  Total IN: 340 mL    OUT:  Total OUT: 0 mL    Total NET: 340 mL          Labs:                        11.6   13.31 )-----------( 255      ( 15 Apr 2018 09:15 )             35.6     04-14    138  |  100  |  18  ----------------------------<  172<H>  4.6   |  25  |  0.90    Ca    8.7      14 Apr 2018 14:16    TPro  6.4  /  Alb  3.2<L>  /  TBili  0.6  /  DBili  x   /  AST  82<H>  /  ALT  52<H>  /  AlkPhos  329<H>  04-14    PT/INR - ( 14 Apr 2018 14:16 )   PT: 12.4 sec;   INR: 1.13 ratio         PTT - ( 14 Apr 2018 14:16 )  PTT:31.9 sec          Imaging  < from: NM PET/CT Onc FDG Skull to Thigh, Inital (04.10.18 @ 15:33) >  FINDINGS:      HEAD/NECK: Physiologic FDG activity seen in the visualized portions of   the brain. FDG avid left supraclavicular lymph nodes as a reference 1.7 x   1.1 cm (SUV 18.5; image 71).    THORAX:  Mediastinal and hilar lymphadenopathy. As a reference,   prevascular lymph node, 2.1 x 1.5 cm (SUV 14.7; image 95) and difficult   to delineate right hilar lymph node (SUV 8.6; image 105) No abnormal   avidity.Physiologic FDG activity in the myocardiumand blood pool.      LUNGS: Multiple bilateral pulmonary nodules, the largest of which is in   the right lower lobe measuring 3.1 x 2.5 cm with central photopenia   suggestive of necrosis (SUV 14.3; image 131). Nodule in the left lower   lobe, 1.6 x 1.2 cm (SUV 21.6; image 129). Multiple areas of groundglass   attenuation and interstitial thickening.    PLEURA/PERICARDIUM: No abnormal avidity. No pericardial or pleural   effusions.    HEPATOBILIARY/PANCREAS: Widespread conglomerate FDG avidity throughout   the liver replacing nearly the entire liver parenchyma, difficult to   measure. An area in the posterior segment of the right lobe (SUV 17.3;   image 151) and in the left hepatic lobe (SUV 19.9; image 144).    SPLEEN: No abnormal avidity.    ADRENAL GLANDS: No abnormal avidity.    KIDNEYS/URINARY BLADDER: Excreted activity is seen.   No abnormal avidity.    ABDOMINOPELVIC NODES:   Multiple retroperitoneal, portal and celiac axis   lymph nodes. As a reference, a left periaortic lymph node deep to the   left renal vein, 3.1 x 2.2 cm (SUV 20.1; image 170). Mesenteric lymph   nodes also identified for example in the small bowel mesentery 1.5 x 1.6   cm (SUV 11.3; image 185)    BOWEL/PERITONEUM/MESENTERY:  Heterogeneous mural thickening in the distal   transverse colon/splenic flexure (SUV 16.9; image 166). Sigmoid   diverticulosis.    Peritoneal nodule adjacent to the transverse colonic mass, 1.7 x 1.5 cm   (SUV 5.0; image 189).    PELVIC ORGANS: No abnormal avidity.    BONES:  Scattered FDG avid osseous foci for example, a marrow lesion in   the right iliac bone (SUV 14.5; image 221) and in the left anterior   second rib (SUV 9.1; image 90). Several additional foci in the pelvic   bones are also suspicious.    SOFT TISSUES:  Noabnormal avidity.    IMPRESSION:      1.  FDG avid heterogeneous mural thickening distal transverse   colon/splenic flexure probably represents primary malignancy.    2.  Widespread FDG avid hepatic lesions, retroperitoneal and mesenteric   lymphadenopathy and peritoneal nodules consistent with metastatic disease.    3.  Small FDG avid foci in the pelvic bones and ribs, suspicious for   osseous metastatic disease.    4.  FDG avid bilateral pulmonary nodules and mediastinal/hilar   lymphadenopathy, probably metastatic. Heterogeneous interstitial   prominence and groundglass opacities are nonspecific, possibly   inflammatory/infectious, however lymphangitic metastatic disease is   possible and incompletely evaluated due to nonbreath-hold technique.      < end of copied text >

## 2018-04-15 NOTE — CONSULT NOTE ADULT - ASSESSMENT
67 year old male with newly diagnosed colon cancer with mets to the lung and liver, here with bright red blood per rectum, pt hemodynamically stable  - would recommend US guided liver biopsy for tissue diagnosis  - suspect slow bleed from the tumor  - continue to trend H/H, transfuse as needed  - favor endoscopic intervention for bleeding over surgery if needed  - discussed with attg Yecenia Sumner   Surgical Oncology  [facility] [id number]

## 2018-04-15 NOTE — PROGRESS NOTE ADULT - SUBJECTIVE AND OBJECTIVE BOX
Patient is a 67y old  Male who presents with a chief complaint of bright red blood per rectum (14 Apr 2018 16:15)      SUBJECTIVE / OVERNIGHT EVENTS:    MEDICATIONS  (STANDING):  metoprolol succinate ER 50 milliGRAM(s) Oral daily  pantoprazole  Injectable 40 milliGRAM(s) IV Push every 12 hours    MEDICATIONS  (PRN):      Vital Signs Last 24 Hrs  T(C): 37.3 (15 Apr 2018 04:29), Max: 37.3 (15 Apr 2018 04:29)  T(F): 99.1 (15 Apr 2018 04:29), Max: 99.1 (15 Apr 2018 04:29)  HR: 85 (15 Apr 2018 04:29) (73 - 89)  BP: 155/86 (15 Apr 2018 04:29) (135/77 - 158/87)  BP(mean): 96 (14 Apr 2018 16:15) (96 - 96)  RR: 18 (15 Apr 2018 04:29) (16 - 18)  SpO2: 95% (15 Apr 2018 04:29) (94% - 97%)  CAPILLARY BLOOD GLUCOSE        I&O's Summary    14 Apr 2018 07:01  -  15 Apr 2018 07:00  --------------------------------------------------------  IN: 340 mL / OUT: 0 mL / NET: 340 mL        PHYSICAL EXAM:  GENERAL: NAD, well-developed  HEAD:  Atraumatic, Normocephalic  EYES: EOMI, PERRLA, conjunctiva and sclera clear  NECK: Supple, No JVD  CHEST/LUNG: Clear to auscultation bilaterally; No wheeze  HEART: Regular rate and rhythm; No murmurs, rubs, or gallops  ABDOMEN: Soft, Nontender, Nondistended; Bowel sounds present  EXTREMITIES:  2+ Peripheral Pulses, No clubbing, cyanosis, or edema  PSYCH: AAOx3  NEUROLOGY: non-focal  SKIN: No rashes or lesions    LABS:                        11.3   12.8  )-----------( 244      ( 14 Apr 2018 22:43 )             34.5     04-14    138  |  100  |  18  ----------------------------<  172<H>  4.6   |  25  |  0.90    Ca    8.7      14 Apr 2018 14:16    TPro  6.4  /  Alb  3.2<L>  /  TBili  0.6  /  DBili  x   /  AST  82<H>  /  ALT  52<H>  /  AlkPhos  329<H>  04-14    PT/INR - ( 14 Apr 2018 14:16 )   PT: 12.4 sec;   INR: 1.13 ratio         PTT - ( 14 Apr 2018 14:16 )  PTT:31.9 sec          RADIOLOGY & ADDITIONAL TESTS:    Imaging Personally Reviewed:    Consultant(s) Notes Reviewed:      Care Discussed with Consultants/Other Providers: Patient is a 67y old  Male who presents with a chief complaint of bright red blood per rectum (14 Apr 2018 16:15)      SUBJECTIVE / OVERNIGHT EVENTS: Pt seen and examined with wife at bedside. He reports no acute events overnight. Had a bowel movement this morning with minimal blood.  Denies abd pain, n/v, CP, SOB, dizziness, palpitations.    MEDICATIONS  (STANDING):  metoprolol succinate ER 50 milliGRAM(s) Oral daily  pantoprazole  Injectable 40 milliGRAM(s) IV Push every 12 hours    MEDICATIONS  (PRN):      Vital Signs Last 24 Hrs  T(C): 37.3 (15 Apr 2018 04:29), Max: 37.3 (15 Apr 2018 04:29)  T(F): 99.1 (15 Apr 2018 04:29), Max: 99.1 (15 Apr 2018 04:29)  HR: 85 (15 Apr 2018 04:29) (73 - 89)  BP: 155/86 (15 Apr 2018 04:29) (135/77 - 158/87)  BP(mean): 96 (14 Apr 2018 16:15) (96 - 96)  RR: 18 (15 Apr 2018 04:29) (16 - 18)  SpO2: 95% (15 Apr 2018 04:29) (94% - 97%)  CAPILLARY BLOOD GLUCOSE        I&O's Summary    14 Apr 2018 07:01  -  15 Apr 2018 07:00  --------------------------------------------------------  IN: 340 mL / OUT: 0 mL / NET: 340 mL        PHYSICAL EXAM:  GENERAL: NAD,anicteric, afebrile to touch  HEAD:  Atraumatic, Normocephalic  EYES: EOMI, PERRLA, conjunctiva and sclera clear  NECK: Supple, No JVD  CHEST/LUNG: Clear to auscultation bilaterally; No wheeze  HEART: Regular rate and rhythm; No murmurs, rubs, or gallops  ABDOMEN: Soft, Nontender, Nondistended; Bowel sounds present  EXTREMITIES:  2+ Peripheral Pulses, No clubbing, cyanosis, or edema  PSYCH: AAOx3  NEUROLOGY: non-focal  SKIN: No rashes or lesions    LABS:                        11.3   12.8  )-----------( 244      ( 14 Apr 2018 22:43 )             34.5     04-14    138  |  100  |  18  ----------------------------<  172<H>  4.6   |  25  |  0.90    Ca    8.7      14 Apr 2018 14:16    TPro  6.4  /  Alb  3.2<L>  /  TBili  0.6  /  DBili  x   /  AST  82<H>  /  ALT  52<H>  /  AlkPhos  329<H>  04-14    PT/INR - ( 14 Apr 2018 14:16 )   PT: 12.4 sec;   INR: 1.13 ratio         PTT - ( 14 Apr 2018 14:16 )  PTT:31.9 sec          RADIOLOGY & ADDITIONAL TESTS:    Imaging Personally Reviewed:    Consultant(s) Notes Reviewed:      Care Discussed with Consultants/Other Providers:

## 2018-04-16 ENCOUNTER — RESULT REVIEW (OUTPATIENT)
Age: 68
End: 2018-04-16

## 2018-04-16 DIAGNOSIS — K56.609 UNSPECIFIED INTESTINAL OBSTRUCTION, UNSPECIFIED AS TO PARTIAL VERSUS COMPLETE OBSTRUCTION: ICD-10-CM

## 2018-04-16 LAB
HCT VFR BLD CALC: 37.9 % — LOW (ref 39–50)
HCT VFR BLD CALC: 40.2 % — SIGNIFICANT CHANGE UP (ref 39–50)
HGB BLD-MCNC: 12.6 G/DL — LOW (ref 13–17)
HGB BLD-MCNC: 13.1 G/DL — SIGNIFICANT CHANGE UP (ref 13–17)
MCHC RBC-ENTMCNC: 29.7 PG — SIGNIFICANT CHANGE UP (ref 27–34)
MCHC RBC-ENTMCNC: 30.3 PG — SIGNIFICANT CHANGE UP (ref 27–34)
MCHC RBC-ENTMCNC: 32.7 GM/DL — SIGNIFICANT CHANGE UP (ref 32–36)
MCHC RBC-ENTMCNC: 33.3 GM/DL — SIGNIFICANT CHANGE UP (ref 32–36)
MCV RBC AUTO: 90.8 FL — SIGNIFICANT CHANGE UP (ref 80–100)
MCV RBC AUTO: 91 FL — SIGNIFICANT CHANGE UP (ref 80–100)
PLATELET # BLD AUTO: 267 K/UL — SIGNIFICANT CHANGE UP (ref 150–400)
PLATELET # BLD AUTO: 287 K/UL — SIGNIFICANT CHANGE UP (ref 150–400)
RBC # BLD: 4.17 M/UL — LOW (ref 4.2–5.8)
RBC # BLD: 4.43 M/UL — SIGNIFICANT CHANGE UP (ref 4.2–5.8)
RBC # FLD: 12.6 % — SIGNIFICANT CHANGE UP (ref 10.3–14.5)
RBC # FLD: 12.6 % — SIGNIFICANT CHANGE UP (ref 10.3–14.5)
WBC # BLD: 13.2 K/UL — HIGH (ref 3.8–10.5)
WBC # BLD: 14 K/UL — HIGH (ref 3.8–10.5)
WBC # FLD AUTO: 13.2 K/UL — HIGH (ref 3.8–10.5)
WBC # FLD AUTO: 14 K/UL — HIGH (ref 3.8–10.5)

## 2018-04-16 PROCEDURE — 99232 SBSQ HOSP IP/OBS MODERATE 35: CPT

## 2018-04-16 PROCEDURE — 45331 SIGMOIDOSCOPY AND BIOPSY: CPT | Mod: GC

## 2018-04-16 PROCEDURE — 88305 TISSUE EXAM BY PATHOLOGIST: CPT | Mod: 26

## 2018-04-16 PROCEDURE — 99233 SBSQ HOSP IP/OBS HIGH 50: CPT

## 2018-04-16 RX ORDER — LOSARTAN POTASSIUM 100 MG/1
50 TABLET, FILM COATED ORAL DAILY
Qty: 0 | Refills: 0 | Status: DISCONTINUED | OUTPATIENT
Start: 2018-04-16 | End: 2018-04-19

## 2018-04-16 RX ADMIN — PANTOPRAZOLE SODIUM 40 MILLIGRAM(S): 20 TABLET, DELAYED RELEASE ORAL at 05:48

## 2018-04-16 RX ADMIN — Medication 50 MILLIGRAM(S): at 05:48

## 2018-04-16 RX ADMIN — PANTOPRAZOLE SODIUM 40 MILLIGRAM(S): 20 TABLET, DELAYED RELEASE ORAL at 17:28

## 2018-04-16 RX ADMIN — LOSARTAN POTASSIUM 50 MILLIGRAM(S): 100 TABLET, FILM COATED ORAL at 17:28

## 2018-04-16 NOTE — PROGRESS NOTE ADULT - PROBLEM SELECTOR PLAN 2
recently diagnosed, with extensive metastatic disease.  Spoke with Dr Casey from Oncology- he is agreeable to a colonic stent placement by GI.     He is also requesting a liver met bx for [facility] testing- called Dr Kedar Arellano from /s- he will try to do the biopsy today or tomorrow- order placed.

## 2018-04-16 NOTE — PROGRESS NOTE ADULT - PROBLEM SELECTOR PLAN 1
Awaiting biopsy results. He will also have a biopsy of the liver. Management will be discussed after biopsy results become available.

## 2018-04-16 NOTE — PROGRESS NOTE ADULT - SUBJECTIVE AND OBJECTIVE BOX
66 y/o man with newly diagnosed colon cancer with mets to the lung, liver, bones and retroperitoneal LN, now admitted with bright red blood per rectum. Formal diagnostic not established yet, he had a biopsy of the colonic mass today.    INTERVAL HPI/OVERNIGHT EVENTS:  Patient S&E at bedside. No o/n events,     VITAL SIGNS:  T(F): 97.9 (04-16-18 @ 08:06)  HR: 80 (04-16-18 @ 08:06)  BP: 144/88 (04-16-18 @ 08:06)  RR: 18 (04-16-18 @ 08:06)  SpO2: 93% (04-16-18 @ 08:06)  Wt(kg): --    PHYSICAL EXAM:    Constitutional: NAD  Eyes: EOMI, sclera non-icteric  Neck: supple, no masses, no JVD  Respiratory: CTA b/l, good air entry b/l  Cardiovascular: RRR, no M/R/G  Gastrointestinal: soft, NTND, no masses palpable, + BS, no hepatosplenomegaly  Extremities: no c/c/e  Neurological: AAOx3      MEDICATIONS  (STANDING):  losartan 50 milliGRAM(s) Oral daily  metoprolol succinate ER 50 milliGRAM(s) Oral daily  pantoprazole  Injectable 40 milliGRAM(s) IV Push every 12 hours    MEDICATIONS  (PRN):      Allergies    sulfa drugs (Unknown)    Intolerances        LABS:                        13.1   14.0  )-----------( 287      ( 16 Apr 2018 07:55 )             40.2                 RADIOLOGY & ADDITIONAL TESTS:  Studies reviewed.

## 2018-04-16 NOTE — CHART NOTE - NSCHARTNOTEFT_GEN_A_CORE
PROCEDURE NOTE:    COLONOSCOPY:  - near obstructing circumferential mass at the splenic flexure, biopsied  - unable to traverse the mass with the colonoscope  - sigmoid diverticulosis    PLAN:  - clear liquid diet, advance to full liquids as tolerated  - f/u biopsies  - Heme Onc recommendations  - Will need discussion options including palliative colon resection vs palliative colonic stent for colonic obstruction

## 2018-04-16 NOTE — PROGRESS NOTE ADULT - SUBJECTIVE AND OBJECTIVE BOX
Patient is a 67y old  Male who presents with a chief complaint of GI hemorrhage (16 Apr 2018 08:20)    HPI: Colonoscopy today revealed a near obstructing circumferential mass at the splenic flexure, biopsied - unable to traverse the mass with the colonoscope. Pt feels well, requesting diet.    Vital Signs Last 24 Hrs  T(C): 36.6 (16 Apr 2018 08:06), Max: 37.2 (15 Apr 2018 17:44)  T(F): 97.9 (16 Apr 2018 08:06), Max: 99 (15 Apr 2018 17:44)  HR: 80 (16 Apr 2018 08:06) (77 - 86)  BP: 144/88 (16 Apr 2018 08:06) (144/88 - 163/91)  BP(mean): --  RR: 18 (16 Apr 2018 08:06) (18 - 20)  SpO2: 93% (16 Apr 2018 08:06) (93% - 94%)                          13.1   14.0  )-----------( 287      ( 16 Apr 2018 07:55 )             40.2     04-14    138  |  100  |  18  ----------------------------<  172<H>  4.6   |  25  |  0.90    Ca    8.7      14 Apr 2018 14:16    TPro  6.4  /  Alb  3.2<L>  /  TBili  0.6  /  DBili  x   /  AST  82<H>  /  ALT  52<H>  /  AlkPhos  329<H>  04-14

## 2018-04-16 NOTE — PROGRESS NOTE ADULT - ASSESSMENT
67M with recently diagnosed metastatic Colon CA aw rectal bleeding. Colonoscopy today revealed near obstructing circumferential mass at the splenic flexure, biopsied- unable to traverse the mass with the colonoscope

## 2018-04-16 NOTE — PROGRESS NOTE ADULT - SUBJECTIVE AND OBJECTIVE BOX
SURGICAL ONCOLOGY PROGRESS NOTE      SUBJECTIVE  Pt seen and evaluated this AM. No acute events overnight. Pt reports that he was able to complete the bowel prep for the colonoscopy today with multiple "orange tinged" bowel movements with the prep. Pain currently well controlled. Pt denies fevers, chills, nausea, vomiting, constipation, lightheadedness, chest pain, SOB.      OBJECTIVE    MEDICATIONS  metoprolol succinate ER 50 milliGRAM(s) Oral daily  pantoprazole  Injectable 40 milliGRAM(s) IV Push every 12 hours      PHYSICAL EXAM  T(C): 36.6 (04-16-18 @ 08:06), Max: 37.2 (04-15-18 @ 17:44)  HR: 80 (04-16-18 @ 08:06) (77 - 86)  BP: 144/88 (04-16-18 @ 08:06) (144/88 - 163/91)  RR: 18 (04-16-18 @ 08:06) (18 - 20)  SpO2: 93% (04-16-18 @ 08:06) (93% - 94%)    04-16-18 @ 07:01  -  04-16-18 @ 13:35  --------------------------------------------------------  IN: 0 mL / OUT: 0 mL / NET: 0 mL      General: Pt in bed, NAD  Neuro: AAOx3, No acute focal motor or sensory deficits  CHEST: Nonlabored respirations, equal rib excursion bilaterally  CV: S1 and S2  Abdomen: Soft, nontender, nondistended, no rebound or guarding  Extremities: No edema, cyanosis, or clubbing    LABS                        13.1   14.0  )-----------( 287      ( 16 Apr 2018 07:55 )             40.2     04-14    138  |  100  |  18  ----------------------------<  172<H>  4.6   |  25  |  0.90    Ca    8.7      14 Apr 2018 14:16    TPro  6.4  /  Alb  3.2<L>  /  TBili  0.6  /  DBili  x   /  AST  82<H>  /  ALT  52<H>  /  AlkPhos  329<H>  04-14    PT/INR - ( 14 Apr 2018 14:16 )   PT: 12.4 sec;   INR: 1.13 ratio         PTT - ( 14 Apr 2018 14:16 )  PTT:31.9 sec      RADIOLOGY & ADDITIONAL STUDIES      ASSESSMENT/PLAN  67 year old male with newly diagnosed colon cancer with mets to the lung and liver, now admitted with bright red blood per rectum  - Colonoscopy demonstrates a nearly obstructing circumferential mass at the splenic flexure. Biopsy results pending.  - Consider US guided liver biopsy  - Trend H/H, transfuse as needed  - Favor endoscopic intervention for bleeding or obstruction over surgery if needed  - Discussed with Dr. Keene

## 2018-04-17 ENCOUNTER — APPOINTMENT (OUTPATIENT)
Age: 68
End: 2018-04-17

## 2018-04-17 LAB
ANION GAP SERPL CALC-SCNC: 16 MMOL/L — SIGNIFICANT CHANGE UP (ref 5–17)
BUN SERPL-MCNC: 16 MG/DL — SIGNIFICANT CHANGE UP (ref 7–23)
CALCIUM SERPL-MCNC: 8.7 MG/DL — SIGNIFICANT CHANGE UP (ref 8.4–10.5)
CHLORIDE SERPL-SCNC: 101 MMOL/L — SIGNIFICANT CHANGE UP (ref 96–108)
CO2 SERPL-SCNC: 21 MMOL/L — LOW (ref 22–31)
CREAT SERPL-MCNC: 0.93 MG/DL — SIGNIFICANT CHANGE UP (ref 0.5–1.3)
GLUCOSE SERPL-MCNC: 105 MG/DL — HIGH (ref 70–99)
HCT VFR BLD CALC: 35.8 % — LOW (ref 39–50)
HCT VFR BLD CALC: 37.3 % — LOW (ref 39–50)
HGB BLD-MCNC: 12 G/DL — LOW (ref 13–17)
HGB BLD-MCNC: 12.5 G/DL — LOW (ref 13–17)
MCHC RBC-ENTMCNC: 30.5 PG — SIGNIFICANT CHANGE UP (ref 27–34)
MCHC RBC-ENTMCNC: 30.5 PG — SIGNIFICANT CHANGE UP (ref 27–34)
MCHC RBC-ENTMCNC: 33.5 GM/DL — SIGNIFICANT CHANGE UP (ref 32–36)
MCHC RBC-ENTMCNC: 33.7 GM/DL — SIGNIFICANT CHANGE UP (ref 32–36)
MCV RBC AUTO: 90.6 FL — SIGNIFICANT CHANGE UP (ref 80–100)
MCV RBC AUTO: 90.8 FL — SIGNIFICANT CHANGE UP (ref 80–100)
PLATELET # BLD AUTO: 218 K/UL — SIGNIFICANT CHANGE UP (ref 150–400)
PLATELET # BLD AUTO: 223 K/UL — SIGNIFICANT CHANGE UP (ref 150–400)
POTASSIUM SERPL-MCNC: 4.2 MMOL/L — SIGNIFICANT CHANGE UP (ref 3.5–5.3)
POTASSIUM SERPL-SCNC: 4.2 MMOL/L — SIGNIFICANT CHANGE UP (ref 3.5–5.3)
RBC # BLD: 3.95 M/UL — LOW (ref 4.2–5.8)
RBC # BLD: 4.1 M/UL — LOW (ref 4.2–5.8)
RBC # FLD: 12.5 % — SIGNIFICANT CHANGE UP (ref 10.3–14.5)
RBC # FLD: 12.5 % — SIGNIFICANT CHANGE UP (ref 10.3–14.5)
SODIUM SERPL-SCNC: 138 MMOL/L — SIGNIFICANT CHANGE UP (ref 135–145)
WBC # BLD: 11.6 K/UL — HIGH (ref 3.8–10.5)
WBC # BLD: 11.8 K/UL — HIGH (ref 3.8–10.5)
WBC # FLD AUTO: 11.6 K/UL — HIGH (ref 3.8–10.5)
WBC # FLD AUTO: 11.8 K/UL — HIGH (ref 3.8–10.5)

## 2018-04-17 PROCEDURE — 99233 SBSQ HOSP IP/OBS HIGH 50: CPT

## 2018-04-17 RX ORDER — MULTIVIT WITH MIN/MFOLATE/K2 340-15/3 G
300 POWDER (GRAM) ORAL EVERY 4 HOURS
Qty: 0 | Refills: 0 | Status: COMPLETED | OUTPATIENT
Start: 2018-04-17 | End: 2018-04-17

## 2018-04-17 RX ADMIN — Medication 300 MILLILITER(S): at 14:34

## 2018-04-17 RX ADMIN — Medication 300 MILLILITER(S): at 17:14

## 2018-04-17 RX ADMIN — Medication 50 MILLIGRAM(S): at 05:59

## 2018-04-17 RX ADMIN — PANTOPRAZOLE SODIUM 40 MILLIGRAM(S): 20 TABLET, DELAYED RELEASE ORAL at 05:59

## 2018-04-17 RX ADMIN — PANTOPRAZOLE SODIUM 40 MILLIGRAM(S): 20 TABLET, DELAYED RELEASE ORAL at 17:13

## 2018-04-17 RX ADMIN — LOSARTAN POTASSIUM 50 MILLIGRAM(S): 100 TABLET, FILM COATED ORAL at 17:14

## 2018-04-17 NOTE — PROVIDER CONTACT NOTE (OTHER) - BACKGROUND
hx of GI bleed, told in report that pt had no bloody stool since 4/16, drank magnesium citratex2 to prepare for US and bx for 4/18

## 2018-04-17 NOTE — CHART NOTE - NSCHARTNOTEFT_GEN_A_CORE
Notified by the RN hint of blood in stool as per pt. Pt seen at the bedside. Pt states that this isn't new but hasn't had bloody stools for a day now. Pt is asymptomatic and denies N/V, hematemesis, coffee ground emesis, BRBPR. Pt admitted for GIB  s/p colonoscopy which shoes near obstructing circumferential mass at the splenic. CBC has been stable  flexure. VS: BP: 158/ 85 HR: 74 O2 sat: 95 on RA. Stat CBC send. Will continue to monitor. Will endorse to the day team    Mesha CARDONA   #[id number]

## 2018-04-17 NOTE — PROGRESS NOTE ADULT - ASSESSMENT
67M with recently diagnosed metastatic Colon CA aw rectal bleeding. Colonoscopy yesterday revealed near obstructing circumferential mass at the splenic flexure, biopsied.

## 2018-04-17 NOTE — PROGRESS NOTE ADULT - SUBJECTIVE AND OBJECTIVE BOX
66 y/o man with newly diagnosed colon cancer with mets to the lung, liver, bones and retroperitoneal LN, now admitted with bright red blood per rectum. He has an obstructive lesion present at the splenic flexure. Biopsy consistent with adenocarcinoma,.    Patient S&E at bedside. No o/n events,     VITAL SIGNS:  T(F): 98.1 (04-17-18 @ 15:38)  HR: 80 (04-17-18 @ 15:38)  BP: 161/92 (04-17-18 @ 15:38)  RR: 18 (04-17-18 @ 15:38)  SpO2: 93% (04-17-18 @ 15:38)  Wt(kg): --    PHYSICAL EXAM:    Constitutional: NAD  Eyes: EOMI, sclera non-icteric  Neck: supple, no masses, no JVD  Respiratory: CTA b/l, good air entry b/l  Cardiovascular: RRR, no M/R/G  Gastrointestinal: soft, NTND, no masses palpable, + BS, no hepatosplenomegaly  Extremities: no c/c/e  Neurological: AAOx3      MEDICATIONS  (STANDING):  losartan 50 milliGRAM(s) Oral daily  magnesium citrate Solution 300 milliLiter(s) Oral every 4 hours  metoprolol succinate ER 50 milliGRAM(s) Oral daily  pantoprazole  Injectable 40 milliGRAM(s) IV Push every 12 hours    MEDICATIONS  (PRN):      Allergies    sulfa drugs (Unknown)    Intolerances        LABS:                        12.0   11.8  )-----------( 218      ( 17 Apr 2018 06:04 )             35.8     04-17    138  |  101  |  16  ----------------------------<  105<H>  4.2   |  21<L>  |  0.93    Ca    8.7      17 Apr 2018 06:04            RADIOLOGY & ADDITIONAL TESTS:  Studies reviewed.

## 2018-04-17 NOTE — PROGRESS NOTE ADULT - SUBJECTIVE AND OBJECTIVE BOX
Chief Complaint:  Patient is a 67y old  Male who presents with a chief complaint of GI hemorrhage (16 Apr 2018 08:20)      Interval Events:     Allergies:  sulfa drugs (Unknown)      Hospital Medications:  losartan 50 milliGRAM(s) Oral daily  metoprolol succinate ER 50 milliGRAM(s) Oral daily  pantoprazole  Injectable 40 milliGRAM(s) IV Push every 12 hours      PMHX/PSHX:  Malignant neoplasm of colon, unspecified part of colon  HTN (hypertension)  History of tonsillectomy      Family history:  Family history of breast cancer in mother (Mother)      ROS:     General:  No wt loss, fevers, chills, night sweats, fatigue,   Eyes:  Good vision, no reported pain  ENT:  No sore throat, pain, runny nose, dysphagia  CV:  No pain, palpitations, hypo/hypertension  Resp:  No dyspnea, cough, tachypnea, wheezing  GI:  See HPI  :  No pain, bleeding, incontinence, nocturia  Muscle:  No pain, weakness  Neuro:  No weakness, tingling, memory problems  Psych:  No fatigue, insomnia, mood problems, depression  Endocrine:  No polyuria, polydipsia, cold/heat intolerance  Heme:  No petechiae, ecchymosis, easy bruisability  Skin:  No rash, edema      PHYSICAL EXAM:     GENERAL:  Appears stated age, well-groomed, well-nourished, no distress  HEENT:  NC/AT,  conjunctivae clear, sclera -anicteric  CHEST:  Full & symmetric excursion, no increased effort, breath sounds clear  HEART:  Regular rhythm, S1, S2, no murmur/rub/S3/S4,  no edema  ABDOMEN:  Soft, non-tender, non-distended, normoactive bowel sounds,  no masses ,no hepato-splenomegaly,   EXTREMITIES:  no cyanosis,clubbing or edema  SKIN:  No rash/erythema/ecchymoses/petechiae/wounds/abscess/warm/dry  NEURO:  Alert, oriented    Vital Signs:  Vital Signs Last 24 Hrs  T(C): 36.9 (17 Apr 2018 08:03), Max: 37.3 (16 Apr 2018 23:41)  T(F): 98.4 (17 Apr 2018 08:03), Max: 99.1 (16 Apr 2018 23:41)  HR: 86 (17 Apr 2018 08:03) (83 - 88)  BP: 157/85 (17 Apr 2018 08:03) (124/79 - 157/85)  BP(mean): --  RR: 18 (17 Apr 2018 08:03) (18 - 20)  SpO2: 94% (17 Apr 2018 08:03) (93% - 96%)  Daily     Daily     LABS:                        12.0   11.8  )-----------( 218      ( 17 Apr 2018 06:04 )             35.8     04-17    138  |  101  |  16  ----------------------------<  105<H>  4.2   |  21<L>  |  0.93    Ca    8.7      17 Apr 2018 06:04      Imaging:    < from: Colonoscopy (04.16.18 @ 09:16) >  Findings:       The perianal anddigital rectal examinations were normal.       An infiltrative nearly obstructing mass was found at the splenic flexure. The mass was        circumferential. The mass could not be traversed using the pediatric colonoscope. There was        contract oozing. Biopsies were taken with a cold forceps for histology.       Many medium-mouthed diverticula were found in the sigmoid colon.                                                                                                        Impression:     - Likely malignant nearly obstructing tumor at the splenic flexure. Biopsied.                        Unable to traverse using the colonoscope.                       - Diverticulosis in the sigmoid colon.  Recommendation:      - [facility]                       - Await pathology results.                       - Discuss options of palliative colon resection vs colonic stent for                        management of obstructing mass                       - Repeat colonoscopy.    < end of copied text >    < from: CT Abdomen and Pelvis w/ Oral Cont and w/ IV Cont (04.10.18 @ 15:37) >  LOWER CHEST: Pulmonary metastatic disease. A reference lesion in the   right lower lobe (2:22) 3.2 x 2.7 cm. Coronary calcification.    LIVER: Extensive bilateral lobar hepatic metastatic disease. A discrete   lesion in the right hepatic lobe (2:45) 4.4 x 3.8 cm.  BILE DUCTS: Normal caliber.  GALLBLADDER: Contracted.  SPLEEN: Within normal limits.  PANCREAS: Within normal limits.  ADRENALS: Within normal limits.  KIDNEYS/URETERS: Within normal limits.    BLADDER: Within normal limits.  REPRODUCTIVE ORGANS:Enlarged prostate.    BOWEL: No bowel obstruction. Sigmoid diverticulosis. Circumferential mass at the splenic flexure corresponding with patient's primary neoplasm.   Appendix is normal.  PERITONEUM: Trace ascites.  VESSELS:  Atherosclerotic changes.  RETROPERITONEUM: Extensive retroperitoneal adenopathy. A reference left para-aortic node (2:44) 3.1 x 2.3 cm    ABDOMINAL WALL: Within normal limits.  BONES: Degenerative changes of the spine. Bilateral hip a vascular   necrosis. No evidence ofcollapse.    IMPRESSION: Colonic mass with extensive metastatic disease as above.      < end of copied text >      < from: NM PET/CT Onc FDG Skull to Thigh, Inital (04.10.18 @ 15:33) >  IMPRESSION:      1.  FDG avid heterogeneous mural thickening distal transverse colon/splenic flexure probably represents primary malignancy.    2.  Widespread FDG avid hepatic lesions, retroperitoneal and mesenteric lymphadenopathy and peritoneal nodules consistent with metastatic disease.    3.  Small FDG avid foci in the pelvic bones and ribs, suspicious for osseous metastatic disease.    4.  FDG avid bilateral pulmonary nodules and mediastinal/hilar lymphadenopathy, probably metastatic. Heterogeneous interstitial prominence and groundglass opacities are nonspecific, possibly inflammatory/infectious, however lymphangitic metastatic disease is possible and incompletely evaluated due to nonbreath-hold technique.            < end of copied text > Chief Complaint:  Patient is a 67y old  Male who presents with a chief complaint of GI hemorrhage (16 Apr 2018 08:20)      Interval Events:   Patient had a colonoscopy yesterday and tolerated the procedure.     Allergies:  sulfa drugs (Unknown)      Hospital Medications:  losartan 50 milliGRAM(s) Oral daily  metoprolol succinate ER 50 milliGRAM(s) Oral daily  pantoprazole  Injectable 40 milliGRAM(s) IV Push every 12 hours      PMHX/PSHX:  Malignant neoplasm of colon, unspecified part of colon  HTN (hypertension)  History of tonsillectomy      Family history:  Family history of breast cancer in mother (Mother)      ROS:     General:  No wt loss, fevers, chills, night sweats, fatigue,   Eyes:  Good vision, no reported pain  ENT:  No sore throat, pain, runny nose, dysphagia  CV:  No pain, palpitations, hypo/hypertension  Resp:  No dyspnea, cough, tachypnea, wheezing  GI:  See HPI  :  No pain, bleeding, incontinence, nocturia  Muscle:  No pain, weakness  Neuro:  No weakness, tingling, memory problems  Psych:  No fatigue, insomnia, mood problems, depression  Endocrine:  No polyuria, polydipsia, cold/heat intolerance  Heme:  No petechiae, ecchymosis, easy bruisability  Skin:  No rash, edema      PHYSICAL EXAM:     GENERAL:  Appears stated age, well-groomed, well-nourished, no distress  HEENT:  NC/AT,  conjunctivae clear, sclera -anicteric  CHEST:  Full & symmetric excursion, no increased effort, breath sounds clear  HEART:  Regular rhythm, S1, S2, no murmur/rub/S3/S4,  no edema  ABDOMEN:  Soft, non-tender, non-distended, normoactive bowel sounds,  no masses ,no hepato-splenomegaly,   EXTREMITIES:  no cyanosis,clubbing or edema  SKIN:  No rash  NEURO:  Alert, oriented    Vital Signs:  Vital Signs Last 24 Hrs  T(C): 36.9 (17 Apr 2018 08:03), Max: 37.3 (16 Apr 2018 23:41)  T(F): 98.4 (17 Apr 2018 08:03), Max: 99.1 (16 Apr 2018 23:41)  HR: 86 (17 Apr 2018 08:03) (83 - 88)  BP: 157/85 (17 Apr 2018 08:03) (124/79 - 157/85)  BP(mean): --  RR: 18 (17 Apr 2018 08:03) (18 - 20)  SpO2: 94% (17 Apr 2018 08:03) (93% - 96%)  Daily     Daily     LABS:                        12.0   11.8  )-----------( 218      ( 17 Apr 2018 06:04 )             35.8     04-17    138  |  101  |  16  ----------------------------<  105<H>  4.2   |  21<L>  |  0.93    Ca    8.7      17 Apr 2018 06:04      Imaging:    < from: Colonoscopy (04.16.18 @ 09:16) >  Findings:       The perianal anddigital rectal examinations were normal.       An infiltrative nearly obstructing mass was found at the splenic flexure. The mass was        circumferential. The mass could not be traversed using the pediatric colonoscope. There was        contract oozing. Biopsies were taken with a cold forceps for histology.       Many medium-mouthed diverticula were found in the sigmoid colon.                                                                                                        Impression:     - Likely malignant nearly obstructing tumor at the splenic flexure. Biopsied.                        Unable to traverse using the colonoscope.                       - Diverticulosis in the sigmoid colon.  Recommendation:      - [facility].                       - Await pathology results.                       - Discuss options of palliative colon resection vs colonic stent for                        management of obstructing mass                       - Repeat colonoscopy.    < end of copied text >    < from: CT Abdomen and Pelvis w/ Oral Cont and w/ IV Cont (04.10.18 @ 15:37) >  LOWER CHEST: Pulmonary metastatic disease. A reference lesion in the   right lower lobe (2:22) 3.2 x 2.7 cm. Coronary calcification.    LIVER: Extensive bilateral lobar hepatic metastatic disease. A discrete   lesion in the right hepatic lobe (2:45) 4.4 x 3.8 cm.  BILE DUCTS: Normal caliber.  GALLBLADDER: Contracted.  SPLEEN: Within normal limits.  PANCREAS: Within normal limits.  ADRENALS: Within normal limits.  KIDNEYS/URETERS: Within normal limits.    BLADDER: Within normal limits.  REPRODUCTIVE ORGANS:Enlarged prostate.    BOWEL: No bowel obstruction. Sigmoid diverticulosis. Circumferential mass at the splenic flexure corresponding with patient's primary neoplasm.   Appendix is normal.  PERITONEUM: Trace ascites.  VESSELS:  Atherosclerotic changes.  RETROPERITONEUM: Extensive retroperitoneal adenopathy. A reference left para-aortic node (2:44) 3.1 x 2.3 cm    ABDOMINAL WALL: Within normal limits.  BONES: Degenerative changes of the spine. Bilateral hip a vascular   necrosis. No evidence ofcollapse.    IMPRESSION: Colonic mass with extensive metastatic disease as above.      < end of copied text >      < from: NM PET/CT Onc FDG Skull to Thigh, Inital (04.10.18 @ 15:33) >  IMPRESSION:      1.  FDG avid heterogeneous mural thickening distal transverse colon/splenic flexure probably represents primary malignancy.    2.  Widespread FDG avid hepatic lesions, retroperitoneal and mesenteric lymphadenopathy and peritoneal nodules consistent with metastatic disease.    3.  Small FDG avid foci in the pelvic bones and ribs, suspicious for osseous metastatic disease.    4.  FDG avid bilateral pulmonary nodules and mediastinal/hilar lymphadenopathy, probably metastatic. Heterogeneous interstitial prominence and groundglass opacities are nonspecific, possibly inflammatory/infectious, however lymphangitic metastatic disease is possible and incompletely evaluated due to nonbreath-hold technique.            < end of copied text >

## 2018-04-17 NOTE — PROGRESS NOTE ADULT - PROBLEM SELECTOR PLAN 2
Portacath placement. He will need continuous infusion chemo with 5FU as an outpatient. Liver biopsy tomorrow in order to document this is the same process.

## 2018-04-17 NOTE — PROGRESS NOTE ADULT - ASSESSMENT
67 year old male with HTN, HLD, psoriasis, recently diagnosed colon mass, along with liver and lung masses (dx by imaging CT PET, mets to lung, liver with likely primary colon mass) presented to the emergency room with chief complain of bright red blood per rectum for one day.     Impression:   - Hematochezia: likely 2/2 colon mass  - Metastatic colon cancer, unknown pathology  - Elevated liver enzymes, likely 2/2 metastatic lesions in the liver     Plan:  - trend CBC, CMP  - pending pathology results   - supportive care as per primary team    GI team will continue to follow.  Thank you for the consult. 67 year old male with HTN, HLD, psoriasis, recently diagnosed colon mass, along with liver and lung masses (dx by imaging CT PET, mets to lung, liver with likely primary colon mass) presented to the emergency room with chief complain of bright red blood per rectum for one day.     Impression:   - Hematochezia: likely 2/2 colon mass, s/p colonoscopy 4/16/2018 with likely malignant nearly obstructing tumor at the splenic flexure. Unable to traverse using the colonoscope  - Metastatic colon cancer, unknown pathology  - Elevated liver enzymes, likely 2/2 metastatic lesions in the liver     Plan:  - trend CBC, CMP  - pending pathology results   - pending discussion on options of palliative colon resection vs colonic stent for management of obstructing mass  - patient is scheduled for a liver biopsy tomorrow 4.18.2018  - supportive care as per primary team    GI team will continue to follow.  Thank you for the consult. 67 year old male with HTN, HLD, psoriasis, recently diagnosed colon mass, along with liver and lung masses (dx by imaging CT PET, mets to lung, liver with likely primary colon mass) presented to the emergency room with chief complain of bright red blood per rectum for one day.     Impression:   - Hematochezia: likely 2/2 colon mass, s/p colonoscopy 4/16/2018 with likely malignant nearly obstructing tumor at the splenic flexure. Unable to traverse using the colonoscope  - Metastatic colon cancer, unknown pathology  - Elevated liver enzymes, likely 2/2 metastatic lesions in the liver     Plan:  - trend CBC, CMP  - pending pathology results   - plan for colonic stent placement for management of obstructing mass on 4/18/2018  - clear liquid diet today, NPO after midnight, prep with magnesium citrate (order placed)  - patient is scheduled for a liver biopsy tomorrow 4.18.2018  - supportive care as per primary team    GI team will continue to follow.  Thank you for the consult. 67 year old male with HTN, HLD, psoriasis, recently diagnosed colon mass, along with liver and lung masses (dx by imaging CT PET, mets to lung, liver with likely primary colon mass) presented to the emergency room with chief complain of bright red blood per rectum for one day.     Impression:   - Hematochezia: likely 2/2 colon mass, s/p colonoscopy 4/16/2018 with likely malignant nearly obstructing tumor at the splenic flexure. Unable to traverse using the colonoscope  - Metastatic colon cancer, unknown pathology  - Elevated liver enzymes, likely 2/2 metastatic lesions in the liver     Plan:  - trend CBC, CMP  - pending pathology results   - plan for colonic stent placement for management of obstructing mass on 4/18/2018  - clear liquid diet today, NPO after midnight, prep with magnesium citrate (order placed)  - we will perform the liver biopsy with EUS tomorrow 4.18.2018, at the same time as colonic stent placement (done in the endoscopy unit)  - supportive care as per primary team    GI team will continue to follow.  Thank you for the consult.

## 2018-04-17 NOTE — PROGRESS NOTE ADULT - SUBJECTIVE AND OBJECTIVE BOX
Patient is a 67y old  Male who presents with a chief complaint of GI hemorrhage (16 Apr 2018 08:20)    HPI: 1 small, soft brown BM today. Denies pain.     Vital Signs Last 24 Hrs  T(C): 36.9 (17 Apr 2018 08:03), Max: 37.3 (16 Apr 2018 23:41)  T(F): 98.4 (17 Apr 2018 08:03), Max: 99.1 (16 Apr 2018 23:41)  HR: 86 (17 Apr 2018 08:03) (83 - 88)  BP: 157/85 (17 Apr 2018 08:03) (124/79 - 157/85)  BP(mean): --  RR: 18 (17 Apr 2018 08:03) (18 - 20)  SpO2: 94% (17 Apr 2018 08:03) (93% - 96%)                          12.0   11.8  )-----------( 218      ( 17 Apr 2018 06:04 )             35.8     04-17    138  |  101  |  16  ----------------------------<  105<H>  4.2   |  21<L>  |  0.93    Ca    8.7      17 Apr 2018 06:04      MEDICATIONS  (STANDING):  losartan 50 milliGRAM(s) Oral daily  magnesium citrate Solution 300 milliLiter(s) Oral every 4 hours  metoprolol succinate ER 50 milliGRAM(s) Oral daily  pantoprazole  Injectable 40 milliGRAM(s) IV Push every 12 hours    MEDICATIONS  (PRN):

## 2018-04-17 NOTE — PROVIDER CONTACT NOTE (OTHER) - SITUATION
pt having having BM s/p magnesium citrate x2 for procedure on 4/18, noticed tinge of red in the toilet, YUVAL rBandon notified

## 2018-04-17 NOTE — PROGRESS NOTE ADULT - SUBJECTIVE AND OBJECTIVE BOX
SURGICAL ONCOLOGY PROGRESS NOTE      SUBJECTIVE  Pt seen and evaluated. No acute events overnight. Pt denies pain currently. No further episodes of BRBPR since yesterday. Pt reports that he is passing flatus with one clear liquid BM following the colonoscopy. Pt denies fevers, chills, nausea, vomiting, diarrhea, constipation, lightheadedness, chest pain, or SOB.      OBJECTIVE    MEDICATIONS  losartan 50 milliGRAM(s) Oral daily  metoprolol succinate ER 50 milliGRAM(s) Oral daily  pantoprazole  Injectable 40 milliGRAM(s) IV Push every 12 hours      PHYSICAL EXAM  T(C): 36.9 (04-17-18 @ 05:47), Max: 37.3 (04-16-18 @ 23:41)  HR: 83 (04-17-18 @ 05:47) (80 - 88)  BP: 149/89 (04-17-18 @ 05:47) (124/79 - 149/89)  RR: 18 (04-17-18 @ 05:47) (18 - 20)  SpO2: 95% (04-17-18 @ 05:47) (93% - 96%)    04-16-18 @ 07:01  -  04-17-18 @ 06:33  --------------------------------------------------------  IN: 480 mL / OUT: 0 mL / NET: 480 mL        General: Pt in bed, NAD  Neuro: AAOx3, no acute focal motor or sensory deficits  HEENT: NC/AT  CHEST: Nonlabored respirations, equal rib excursion bilaterally  CV: S1 and S2  Abdomen: soft, nontender, nondistended, no rebound or guarding  Extremities: No edema, cyanosis, or clubbing    LABS                        12.0   11.8  )-----------( 218      ( 17 Apr 2018 06:04 )             35.8       RADIOLOGY & ADDITIONAL STUDIES    Colonoscopy 4/16/18    Procedure:           Pre-Anesthesia Assessment:                       - The anesthesia plan was to use monitored anesthesia care (MAC).                       After I obtained informed consent, the scope was passed under direct vision.                        Throughout the procedure, the patient's blood pressure, pulse, and oxygen                        saturations were monitored continuously. The Colonoscope was introduced                        through the anus with the intention of advancing to the cecum. The scope was            advanced to the hepatic flexure before the procedure was aborted due to                        findings of a near obstructing hepatix flexure mass. Medications were given.                        The colonoscopy was performed without difficulty. The patient tolerated the                        procedure well. The quality of the bowel preparation was good.                                                                                                        Findings:       The perianal and digital rectal examinations were normal.       An infiltrative nearly obstructing mass was found at the splenic flexure. The mass was        circumferential. The mass could not be traversed using the pediatric colonoscope. There was        contract oozing. Biopsies were taken with a cold forceps for histology.       Many medium-mouthed diverticula were found in the sigmoid colon.                                                                                                        Impression:     - Likely malignant nearly obstructing tumor at the splenic flexure. Biopsied.                        Unable to traverse using the colonoscope.                       - Diverticulosis in the sigmoid colon.      ASSESSMENT/PLAN  67 year old male with newly diagnosed colon cancer with mets to the lung, liver, bones and retroperitoneal lymph nodes, now admitted with bright red blood per rectum  - Colonoscopy demonstrates a nearly obstructing circumferential mass at the splenic flexure. Biopsy results pending.  - US guided liver biopsy planned for 8AM 4/18/18  - No further bleeding reported. Trend H/H, transfuse as needed.  - Favor endoscopic intervention for bleeding or obstruction over surgery if needed.  - Will discuss with Dr. Keene SURGICAL ONCOLOGY PROGRESS NOTE      SUBJECTIVE  Pt seen and evaluated. No acute events overnight. Pt denies pain currently. No further episodes of BRBPR since yesterday. Pt reports that he is passing flatus with one clear liquid BM following the colonoscopy. Pt denies fevers, chills, nausea, vomiting, diarrhea, constipation, lightheadedness, chest pain, or SOB.      OBJECTIVE    MEDICATIONS  losartan 50 milliGRAM(s) Oral daily  metoprolol succinate ER 50 milliGRAM(s) Oral daily  pantoprazole  Injectable 40 milliGRAM(s) IV Push every 12 hours      PHYSICAL EXAM  T(C): 36.9 (04-17-18 @ 05:47), Max: 37.3 (04-16-18 @ 23:41)  HR: 83 (04-17-18 @ 05:47) (80 - 88)  BP: 149/89 (04-17-18 @ 05:47) (124/79 - 149/89)  RR: 18 (04-17-18 @ 05:47) (18 - 20)  SpO2: 95% (04-17-18 @ 05:47) (93% - 96%)    04-16-18 @ 07:01  -  04-17-18 @ 06:33  --------------------------------------------------------  IN: 480 mL / OUT: 0 mL / NET: 480 mL        General: Pt in bed, NAD  Neuro: AAOx3, no acute focal motor or sensory deficits  HEENT: NC/AT  CHEST: Nonlabored respirations, equal rib excursion bilaterally  CV: S1 and S2  Abdomen: soft, nontender, nondistended, no rebound or guarding  Extremities: No edema, cyanosis, or clubbing    LABS                        12.0   11.8  )-----------( 218      ( 17 Apr 2018 06:04 )             35.8       RADIOLOGY & ADDITIONAL STUDIES    Colonoscopy 4/16/18    Procedure:           Pre-Anesthesia Assessment:                       - The anesthesia plan was to use monitored anesthesia care (MAC).                       After I obtained informed consent, the scope was passed under direct vision.                        Throughout the procedure, the patient's blood pressure, pulse, and oxygen                        saturations were monitored continuously. The Colonoscope was introduced                        through the anus with the intention of advancing to the cecum. The scope was            advanced to the hepatic flexure before the procedure was aborted due to                        findings of a near obstructing hepatix flexure mass. Medications were given.                        The colonoscopy was performed without difficulty. The patient tolerated the                        procedure well. The quality of the bowel preparation was good.                                                                                                        Findings:       The perianal and digital rectal examinations were normal.       An infiltrative nearly obstructing mass was found at the splenic flexure. The mass was        circumferential. The mass could not be traversed using the pediatric colonoscope. There was        contract oozing. Biopsies were taken with a cold forceps for histology.       Many medium-mouthed diverticula were found in the sigmoid colon.                                                                                                        Impression:     - Likely malignant nearly obstructing tumor at the splenic flexure. Biopsied.                        Unable to traverse using the colonoscope.                       - Diverticulosis in the sigmoid colon.      ASSESSMENT/PLAN  67 year old male with newly diagnosed colon cancer with mets to the lung, liver, bones and retroperitoneal lymph nodes, now admitted with bright red blood per rectum  - Colonoscopy demonstrates a nearly obstructing circumferential mass at the splenic flexure. Biopsy results pending.  - US guided liver biopsy planned for 8AM 4/18/18  - No further bleeding reported. Trend H/H, transfuse as needed.  - Favor endoscopic intervention for bleeding or obstruction over surgery if needed.  - Discussed with Dr. Newman

## 2018-04-18 ENCOUNTER — RESULT REVIEW (OUTPATIENT)
Age: 68
End: 2018-04-18

## 2018-04-18 ENCOUNTER — APPOINTMENT (OUTPATIENT)
Dept: ULTRASOUND IMAGING | Facility: HOSPITAL | Age: 68
End: 2018-04-18

## 2018-04-18 LAB
ANION GAP SERPL CALC-SCNC: 14 MMOL/L — SIGNIFICANT CHANGE UP (ref 5–17)
BLD GP AB SCN SERPL QL: NEGATIVE — SIGNIFICANT CHANGE UP
BUN SERPL-MCNC: 13 MG/DL — SIGNIFICANT CHANGE UP (ref 7–23)
CALCIUM SERPL-MCNC: 9.1 MG/DL — SIGNIFICANT CHANGE UP (ref 8.4–10.5)
CHLORIDE SERPL-SCNC: 102 MMOL/L — SIGNIFICANT CHANGE UP (ref 96–108)
CO2 SERPL-SCNC: 24 MMOL/L — SIGNIFICANT CHANGE UP (ref 22–31)
CREAT SERPL-MCNC: 0.83 MG/DL — SIGNIFICANT CHANGE UP (ref 0.5–1.3)
GLUCOSE SERPL-MCNC: 92 MG/DL — SIGNIFICANT CHANGE UP (ref 70–99)
HCT VFR BLD CALC: 36 % — LOW (ref 39–50)
HGB BLD-MCNC: 11.9 G/DL — LOW (ref 13–17)
INR BLD: 1.06 RATIO — SIGNIFICANT CHANGE UP (ref 0.88–1.16)
MCHC RBC-ENTMCNC: 29.1 PG — SIGNIFICANT CHANGE UP (ref 27–34)
MCHC RBC-ENTMCNC: 33.1 GM/DL — SIGNIFICANT CHANGE UP (ref 32–36)
MCV RBC AUTO: 88 FL — SIGNIFICANT CHANGE UP (ref 80–100)
PLATELET # BLD AUTO: 236 K/UL — SIGNIFICANT CHANGE UP (ref 150–400)
POTASSIUM SERPL-MCNC: 4.5 MMOL/L — SIGNIFICANT CHANGE UP (ref 3.5–5.3)
POTASSIUM SERPL-SCNC: 4.5 MMOL/L — SIGNIFICANT CHANGE UP (ref 3.5–5.3)
PROTHROM AB SERPL-ACNC: 12 SEC — SIGNIFICANT CHANGE UP (ref 10–13.1)
RBC # BLD: 4.09 M/UL — LOW (ref 4.2–5.8)
RBC # FLD: 14.5 % — SIGNIFICANT CHANGE UP (ref 10.3–14.5)
RH IG SCN BLD-IMP: POSITIVE — SIGNIFICANT CHANGE UP
SODIUM SERPL-SCNC: 140 MMOL/L — SIGNIFICANT CHANGE UP (ref 135–145)
WBC # BLD: 10.98 K/UL — HIGH (ref 3.8–10.5)
WBC # FLD AUTO: 10.98 K/UL — HIGH (ref 3.8–10.5)

## 2018-04-18 PROCEDURE — 99233 SBSQ HOSP IP/OBS HIGH 50: CPT

## 2018-04-18 PROCEDURE — 45347 SIGMOIDOSCOPY W/PLCMT STENT: CPT

## 2018-04-18 PROCEDURE — 88307 TISSUE EXAM BY PATHOLOGIST: CPT | Mod: 26

## 2018-04-18 PROCEDURE — 43242 EGD US FINE NEEDLE BX/ASPIR: CPT

## 2018-04-18 PROCEDURE — 88341 IMHCHEM/IMCYTCHM EA ADD ANTB: CPT | Mod: 26

## 2018-04-18 PROCEDURE — 88342 IMHCHEM/IMCYTCHM 1ST ANTB: CPT | Mod: 26

## 2018-04-18 RX ORDER — DOXAZOSIN MESYLATE 4 MG
4 TABLET ORAL AT BEDTIME
Qty: 0 | Refills: 0 | Status: DISCONTINUED | OUTPATIENT
Start: 2018-04-18 | End: 2018-04-19

## 2018-04-18 RX ADMIN — LOSARTAN POTASSIUM 50 MILLIGRAM(S): 100 TABLET, FILM COATED ORAL at 17:12

## 2018-04-18 RX ADMIN — Medication 50 MILLIGRAM(S): at 06:52

## 2018-04-18 RX ADMIN — Medication 4 MILLIGRAM(S): at 21:32

## 2018-04-18 RX ADMIN — PANTOPRAZOLE SODIUM 40 MILLIGRAM(S): 20 TABLET, DELAYED RELEASE ORAL at 17:12

## 2018-04-18 RX ADMIN — PANTOPRAZOLE SODIUM 40 MILLIGRAM(S): 20 TABLET, DELAYED RELEASE ORAL at 06:23

## 2018-04-18 NOTE — CHART NOTE - NSCHARTNOTEFT_GEN_A_CORE
PROCEDURE NOTE:    COLONOSCOPY:  - mass at hepatic flexure  - guidewire passed under fluoroscopic guidance beyond the hepatic flexure  - stricture noted to be approximately 7-8cm in length  - a WallFlex 25mm x 120mm colonic metal stent successfully deployed    EGD/EUS:  - normal esophagus, stomach and duodenal bulb/2nd portion  - multiple large masses/metastases noted through the liver  - 3 passes of bx/FNA taken using a 22Fr Sharkcore needle    PLAN:  - F/u bx results  - Clear liquids, advance to full liquids today  - monitor bowel movements

## 2018-04-18 NOTE — PROGRESS NOTE ADULT - SUBJECTIVE AND OBJECTIVE BOX
Pre-Endoscopy Evaluation      Referring Physician: Dr. Vasquez                                 Procedure: Colonic stent    Indication for Procedure: Malignant nearly obstructing mass at Splenic flexure    Pertinent History: 67 year old male with PMH of HTN, HLD, Psoriasis, recently diagnosed colon mass, along with liver and lung masses (dx by imaging CT PET, mets to lung, liver with likely primary colon mass) presented to the emergency room with chief complain of bright red blood per rectum for one day.     - Hematochezia: likely 2/2 colon mass, s/p colonoscopy 2018 with likely malignant nearly obstructing tumor at the splenic flexure.   - Metastatic colon cancer, unknown pathology  - Elevated liver enzymes, likely 2/2 metastatic lesions in the liver     Sedation by Anesthesia [X]    PAST MEDICAL & SURGICAL HISTORY:  Malignant neoplasm of colon, unspecified part of colon  HTN (hypertension)  History of tonsillectomy      PMH of Gastroparesis [ ]  Gastric Surgery [ ]  Gastric Outlet Obstruction [ ]    Allergies:    sulfa drugs (Unknown)    Intolerances:    Latex allergy: [ ] yes [X] no    Medications:MEDICATIONS  (STANDING):  losartan 50 milliGRAM(s) Oral daily  metoprolol succinate ER 50 milliGRAM(s) Oral daily  pantoprazole  Injectable 40 milliGRAM(s) IV Push every 12 hours    MEDICATIONS  (PRN):      Smoking: [ ] yes  [X] no    AICD/PPM: [ ] yes   [X] no    Pertinent lab data:                        11.9   10.98 )-----------( 236      ( 2018 09:16 )             36.0     04-18    140  |  102  |  13  ----------------------------<  92  4.5   |  24  |  0.83    Ca    9.1      2018 07:47      PT/INR - ( 2018 09:17 )   PT: 12.0 sec;   INR: 1.06 ratio        Physical Examination:  Daily     Daily Weight in k.2 (2018 07:52)  Vital Signs Last 24 Hrs  T(C): 36.8 (2018 07:52), Max: 37.2 (2018 00:37)  T(F): 98.2 (2018 07:52), Max: 98.9 (2018 00:37)  HR: 73 (2018 09:04) (73 - 80)  BP: 158/88 (2018 09:04) (158/85 - 168/91)  BP(mean): --  RR: 18 (2018 07:52) (16 - 18)  SpO2: 95% (2018 07:52) (93% - 97%)    Drug Dosing Weight  Height (cm): 177.8 (2018 18:35)  Weight (kg): 88.5 (2018 18:35)  BMI (kg/m2): 28 (2018 18:35)  BSA (m2): 2.07 (2018 18:35)    Constitutional: NAD  Neck:  No JVD  Respiratory: CTAB/L  Cardiovascular: S1 and S2  Gastrointestinal: BS+, soft, NT/ND  Extremities: No peripheral edema  Neurological: A/O x 3, no focal deficits  : No Crawford  Skin: No rashes    Comments:    ASA Class: I [ ]  II [ ]  III [ ]  IV [ ]    The patient is a suitable candidate for the planned procedure unless box checked [ ]  No, explain: Pre-Endoscopy Evaluation      Referring Physician: Dr. Vasquez                                 Procedure: Colonic stent placement    Indication for Procedure: Malignant bowel obstruction    Pertinent History: 67 year old male with PMH of HTN, HLD, Psoriasis with nearly obstructing mass at Splenic flexure    Sedation by Anesthesia [X]    PMH of Gastroparesis [ ]  Gastric Surgery [ ]  Gastric Outlet Obstruction [ ]    Allergies:    sulfa drugs (Unknown)    Intolerances:    Latex allergy: [ ] yes [X] no    Medications:MEDICATIONS  (STANDING):  losartan 50 milliGRAM(s) Oral daily  metoprolol succinate ER 50 milliGRAM(s) Oral daily  pantoprazole  Injectable 40 milliGRAM(s) IV Push every 12 hours    MEDICATIONS  (PRN):      Smoking: [ ] yes  [X] no    AICD/PPM: [ ] yes   [X] no    Pertinent lab data:                        11.9   10.98 )-----------( 236      ( 2018 09:16 )             36.0     04-18    140  |  102  |  13  ----------------------------<  92  4.5   |  24  |  0.83    Ca    9.1      2018 07:47      PT/INR - ( 2018 09:17 )   PT: 12.0 sec;   INR: 1.06 ratio        Physical Examination:  Daily     Daily Weight in k.2 (2018 07:52)  Vital Signs Last 24 Hrs  T(C): 36.8 (2018 07:52), Max: 37.2 (2018 00:37)  T(F): 98.2 (2018 07:52), Max: 98.9 (2018 00:37)  HR: 73 (2018 09:04) (73 - 80)  BP: 158/88 (2018 09:04) (158/85 - 168/91)  BP(mean): --  RR: 18 (2018 07:52) (16 - 18)  SpO2: 95% (2018 07:52) (93% - 97%)    Drug Dosing Weight  Height (cm): 177.8 (2018 18:35)  Weight (kg): 88.5 (2018 18:35)  BMI (kg/m2): 28 (2018 18:35)  BSA (m2): 2.07 (2018 18:35)    Constitutional: NAD  Neck:  No JVD  Respiratory: CTAB/L  Cardiovascular: S1 and S2  Gastrointestinal: BS+, soft, NT/ND  Extremities: No peripheral edema  Neurological: A/O x 3  : No Crawford  Skin: No rashes    Comments:    ASA Class: I [ ]  II [ ]  III [x]  IV [ ]    The patient is a suitable candidate for the planned procedure unless box checked [ ]  No, explain:

## 2018-04-18 NOTE — PROGRESS NOTE ADULT - SUBJECTIVE AND OBJECTIVE BOX
Patient is a 67y old  Male who presents with a chief complaint of GI hemorrhage (16 Apr 2018 08:20)    HPI: 1 brown BM today. No pain.     Vital Signs Last 24 Hrs  T(C): 36.8 (18 Apr 2018 07:52), Max: 37.2 (18 Apr 2018 00:37)  T(F): 98.2 (18 Apr 2018 07:52), Max: 98.9 (18 Apr 2018 00:37)  HR: 73 (18 Apr 2018 09:04) (73 - 80)  BP: 158/88 (18 Apr 2018 09:04) (158/85 - 168/91)  BP(mean): --  RR: 18 (18 Apr 2018 07:52) (16 - 18)  SpO2: 95% (18 Apr 2018 07:52) (93% - 97%)                          11.9   10.98 )-----------( 236      ( 18 Apr 2018 09:16 )             36.0     04-18    140  |  102  |  13  ----------------------------<  92  4.5   |  24  |  0.83    Ca    9.1      18 Apr 2018 07:47    MEDICATIONS  (STANDING):  losartan 50 milliGRAM(s) Oral daily  metoprolol succinate ER 50 milliGRAM(s) Oral daily  pantoprazole  Injectable 40 milliGRAM(s) IV Push every 12 hours    MEDICATIONS  (PRN):

## 2018-04-18 NOTE — PROGRESS NOTE ADULT - PROBLEM SELECTOR PLAN 4
No pharmacologic ppx in light of GI bleed  SCDs

## 2018-04-18 NOTE — PROGRESS NOTE ADULT - SUBJECTIVE AND OBJECTIVE BOX
SURGICAL ONCOLOGY PROGRESS NOTE      SUBJECTIVE  Pt seen and evaluated. Pt reports light orange tinge to BM overnight with bowel prep. CBC checked and was stable from previous. Pain currently well controlled. Denies fevers, chills, nausea, vomiting, constipation, chest pain, or SOB. Awaiting colonic stent placement and liver biopsy today.      OBJECTIVE    MEDICATIONS  losartan 50 milliGRAM(s) Oral daily  metoprolol succinate ER 50 milliGRAM(s) Oral daily  pantoprazole  Injectable 40 milliGRAM(s) IV Push every 12 hours      PHYSICAL EXAM  T(C): 36.9 (04-18-18 @ 05:53), Max: 37.2 (04-18-18 @ 00:37)  HR: 76 (04-18-18 @ 05:53) (73 - 86)  BP: 160/87 (04-18-18 @ 05:53) (157/85 - 168/91)  RR: 18 (04-18-18 @ 05:53) (16 - 18)  SpO2: 94% (04-18-18 @ 05:53) (93% - 97%)      General: Pt seated in chair, NAD  Neuro: AAOx3, No acute focal motor or sensory deficits  HEENT: NC/AT  CHEST: Nonlabored respirations, equal rib excursion bilaterally  CV: S1 and S2  Abdomen: Soft, nontender, nondistended, no rebound or guarding  Extremities: No edema, cyanosis, or clubbing    LABS                        12.5   11.6  )-----------( 223      ( 17 Apr 2018 22:42 )             37.3     04-17    138  |  101  |  16  ----------------------------<  105<H>  4.2   |  21<L>  |  0.93    Ca    8.7      17 Apr 2018 06:04      RADIOLOGY & ADDITIONAL STUDIES    ASSESSMENT/PLAN  67 year old male with newly diagnosed colon cancer with mets to the lung, liver, bones and retroperitoneal lymph nodes, now admitted with bright red blood per rectum  - Colonoscopy demonstrates a nearly obstructing circumferential mass at the splenic flexure. Prelim biopsy results indicative of adenocarcinoma  - Colonic stenting and US guided liver biopsy planned for today per GI.  - Continue to monitor for GI bleeding. Trend H/H. Transfuse as needed.  - Favor endoscopic intervention for bleeding or obstruction over surgery if needed.  - Discussed with Dr. Keene

## 2018-04-18 NOTE — PROGRESS NOTE ADULT - PROBLEM SELECTOR PLAN 2
recently diagnosed, with extensive metastatic disease.    For colonic stent and EUS guided liver biopsy today.     Spoke with Dr Greg Monge from IR about mediport placement- he will schedule it for tomorrow.

## 2018-04-18 NOTE — PROGRESS NOTE ADULT - ASSESSMENT
67M with recently diagnosed metastatic Colon CA aw rectal bleeding. Colonoscopy on 4/16 revealed near obstructing circumferential mass at the splenic flexure, biopsied.     Fore colonic stent placement and EUS with biopsy today.

## 2018-04-19 ENCOUNTER — OTHER (OUTPATIENT)
Age: 68
End: 2018-04-19

## 2018-04-19 ENCOUNTER — TRANSCRIPTION ENCOUNTER (OUTPATIENT)
Age: 68
End: 2018-04-19

## 2018-04-19 VITALS
SYSTOLIC BLOOD PRESSURE: 146 MMHG | HEART RATE: 84 BPM | TEMPERATURE: 98 F | DIASTOLIC BLOOD PRESSURE: 83 MMHG | RESPIRATION RATE: 18 BRPM | OXYGEN SATURATION: 93 %

## 2018-04-19 LAB
ALBUMIN SERPL ELPH-MCNC: 3.3 G/DL
ALP BLD-CCNC: 302 U/L
ALT SERPL-CCNC: 65 U/L
ANION GAP SERPL CALC-SCNC: 12 MMOL/L — SIGNIFICANT CHANGE UP (ref 5–17)
ANION GAP SERPL CALC-SCNC: 14 MMOL/L
AST SERPL-CCNC: 64 U/L
BILIRUB SERPL-MCNC: 0.6 MG/DL
BUN SERPL-MCNC: 15 MG/DL — SIGNIFICANT CHANGE UP (ref 7–23)
BUN SERPL-MCNC: 16 MG/DL
CALCIUM SERPL-MCNC: 8.6 MG/DL — SIGNIFICANT CHANGE UP (ref 8.4–10.5)
CALCIUM SERPL-MCNC: 9 MG/DL
CANCER AG19-9 SERPL-ACNC: ABNORMAL U/ML
CEA SERPL-MCNC: 35.5 NG/ML
CGA SERPL-MCNC: 124 NG/ML
CHLORIDE SERPL-SCNC: 102 MMOL/L — SIGNIFICANT CHANGE UP (ref 96–108)
CHLORIDE SERPL-SCNC: 98 MMOL/L
CHOLEST SERPL-MCNC: 140 MG/DL — SIGNIFICANT CHANGE UP (ref 10–199)
CO2 SERPL-SCNC: 24 MMOL/L — SIGNIFICANT CHANGE UP (ref 22–31)
CO2 SERPL-SCNC: 27 MMOL/L
CREAT SERPL-MCNC: 0.86 MG/DL
CREAT SERPL-MCNC: 0.87 MG/DL — SIGNIFICANT CHANGE UP (ref 0.5–1.3)
GLUCOSE SERPL-MCNC: 101 MG/DL — HIGH (ref 70–99)
GLUCOSE SERPL-MCNC: 105 MG/DL
HCT VFR BLD CALC: 34.4 % — LOW (ref 39–50)
HDLC SERPL-MCNC: 29 MG/DL — LOW (ref 40–125)
HGB BLD-MCNC: 11.5 G/DL — LOW (ref 13–17)
LIPID PNL WITH DIRECT LDL SERPL: 95 MG/DL — SIGNIFICANT CHANGE UP
MCHC RBC-ENTMCNC: 29.4 PG — SIGNIFICANT CHANGE UP (ref 27–34)
MCHC RBC-ENTMCNC: 33.4 GM/DL — SIGNIFICANT CHANGE UP (ref 32–36)
MCV RBC AUTO: 88 FL — SIGNIFICANT CHANGE UP (ref 80–100)
PLATELET # BLD AUTO: 206 K/UL — SIGNIFICANT CHANGE UP (ref 150–400)
POTASSIUM SERPL-MCNC: 4.3 MMOL/L — SIGNIFICANT CHANGE UP (ref 3.5–5.3)
POTASSIUM SERPL-SCNC: 4.3 MMOL/L — SIGNIFICANT CHANGE UP (ref 3.5–5.3)
POTASSIUM SERPL-SCNC: 5 MMOL/L
PROT SERPL-MCNC: 6.5 G/DL
RBC # BLD: 3.91 M/UL — LOW (ref 4.2–5.8)
RBC # FLD: 14.4 % — SIGNIFICANT CHANGE UP (ref 10.3–14.5)
SODIUM SERPL-SCNC: 138 MMOL/L — SIGNIFICANT CHANGE UP (ref 135–145)
SODIUM SERPL-SCNC: 139 MMOL/L
TOTAL CHOLESTEROL/HDL RATIO MEASUREMENT: 4.8 RATIO — SIGNIFICANT CHANGE UP (ref 3.4–9.6)
TRIGL SERPL-MCNC: 79 MG/DL — SIGNIFICANT CHANGE UP (ref 10–149)
WBC # BLD: 11.02 K/UL — HIGH (ref 3.8–10.5)
WBC # FLD AUTO: 11.02 K/UL — HIGH (ref 3.8–10.5)

## 2018-04-19 PROCEDURE — 99239 HOSP IP/OBS DSCHRG MGMT >30: CPT

## 2018-04-19 PROCEDURE — 36561 INSERT TUNNELED CV CATH: CPT

## 2018-04-19 PROCEDURE — 76937 US GUIDE VASCULAR ACCESS: CPT | Mod: 26

## 2018-04-19 PROCEDURE — 77001 FLUOROGUIDE FOR VEIN DEVICE: CPT | Mod: 26

## 2018-04-19 RX ORDER — ASPIRIN/CALCIUM CARB/MAGNESIUM 324 MG
1 TABLET ORAL
Qty: 0 | Refills: 0 | COMMUNITY

## 2018-04-19 RX ORDER — POLYETHYLENE GLYCOL 3350 17 G/17G
17 POWDER, FOR SOLUTION ORAL
Qty: 0 | Refills: 0 | COMMUNITY
Start: 2018-04-19

## 2018-04-19 RX ORDER — SALIVA SUBSTITUTE COMB NO.11 351 MG
30 POWDER IN PACKET (EA) MUCOUS MEMBRANE THREE TIMES A DAY
Qty: 0 | Refills: 0 | Status: DISCONTINUED | OUTPATIENT
Start: 2018-04-19 | End: 2018-04-19

## 2018-04-19 RX ORDER — POLYETHYLENE GLYCOL 3350 17 G/17G
17 POWDER, FOR SOLUTION ORAL DAILY
Qty: 0 | Refills: 0 | Status: DISCONTINUED | OUTPATIENT
Start: 2018-04-19 | End: 2018-04-19

## 2018-04-19 RX ORDER — PANTOPRAZOLE SODIUM 20 MG/1
1 TABLET, DELAYED RELEASE ORAL
Qty: 30 | Refills: 0 | OUTPATIENT
Start: 2018-04-19 | End: 2018-05-18

## 2018-04-19 RX ADMIN — POLYETHYLENE GLYCOL 3350 17 GRAM(S): 17 POWDER, FOR SOLUTION ORAL at 16:51

## 2018-04-19 RX ADMIN — Medication 50 MILLIGRAM(S): at 05:28

## 2018-04-19 RX ADMIN — PANTOPRAZOLE SODIUM 40 MILLIGRAM(S): 20 TABLET, DELAYED RELEASE ORAL at 05:28

## 2018-04-19 NOTE — PROGRESS NOTE ADULT - SUBJECTIVE AND OBJECTIVE BOX
Interventional Radiology Brief- Operative Note    Procedure: Port placement    Operators: Helene Sommers    Anesthesia (type): MAC    Contrast: None    EBL: Minimal    Findings/Follow up Plan of Care: Successful right chest port placement via right internal jugular vein using fluoroscopic and sonographic guidance. Port may be accessed.    Specimens Removed: None    Implants: Port    Complications: None    Condition/Disposition: Stable/Recovery    Please call Interventional Radiology x [phone] with any questions, concerns, or issues.

## 2018-04-19 NOTE — DISCHARGE NOTE ADULT - CARE PROVIDER_API CALL
Dakota Casey), Hematology; Internal Medicine; Medical Oncology  [address] [place] [address]  Phone: [phone]  Fax: [phone]    Eloy Thomas), Gastroenterology; Internal Medicine  [address] [place] [address]  Phone: [phone]  Fax: [phone]

## 2018-04-19 NOTE — CHART NOTE - NSCHARTNOTEFT_GEN_A_CORE
Pt in IR for mediport placement.     Clinically stable and clear. No active infection.     Leukocytosis likely due to underlying malignancy.

## 2018-04-19 NOTE — PROGRESS NOTE ADULT - ASSESSMENT
67 year old male with HTN, HLD, psoriasis, recently diagnosed colon mass, along with liver and lung masses (dx by imaging CT PET, mets to lung, liver with likely primary colon mass) presented to the emergency room with chief complain of bright red blood per rectum for one day.     Impression:   - Hematochezia: likely 2/2 colon mass, s/p colonoscopy 4/16/2018 with likely malignant nearly obstructing tumor at the splenic flexure. Unable to traverse, repeat colonoscopy on 4/18 with successful deployment of a WallFlex 25mm x 120mm metal stent  - Metastatic colon cancer, unknown pathology  - Elevated liver enzymes, likely 2/2 metastatic lesions in the liver s/p EUS and liver biopsy on 4/18/2018    Plan:  - trend CBC, CMP  - pending pathology results   - monitor bowel movements   - supportive care as per primary/oncology team    GI team will continue to follow.  Thank you for the consult. 67 year old male with HTN, HLD, psoriasis, recently diagnosed colon mass, along with liver and lung masses (dx by imaging CT PET, mets to lung, liver with likely primary colon mass) presented to the emergency room with chief complain of bright red blood per rectum for one day.     Impression:   - Hematochezia: likely 2/2 colon mass, s/p colonoscopy 4/16/2018 with likely malignant nearly obstructing tumor at the splenic flexure. Unable to traverse, repeat colonoscopy on 4/18 with successful deployment of a WallFlex 25mm x 120mm metal stent  - Metastatic colon cancer, unknown pathology  - Elevated liver enzymes, likely 2/2 metastatic lesions in the liver s/p EUS and liver biopsy on 4/18/2018    Plan:  - trend CBC, CMP  - pending pathology results   - monitor bowel movements   - please start miralax BID, aim for soft stool at home to prevent stent occulsion   - supportive care as per primary/oncology team  - patient can follow with Dr Thomas on discharge as outpatient    GI team will continue to follow.  Thank you for the consult.

## 2018-04-19 NOTE — PROGRESS NOTE ADULT - PROVIDER SPECIALTY LIST ADULT
Gastroenterology
Heme/Onc
Heme/Onc
Hospitalist
Intervent Radiology
Intervent Radiology
Surgery
Hospitalist
Hospitalist

## 2018-04-19 NOTE — PROGRESS NOTE ADULT - PROBLEM SELECTOR PROBLEM 1
Malignant neoplasm of colon, unspecified part of colon
Colon obstruction
Gastrointestinal hemorrhage, unspecified gastrointestinal hemorrhage type

## 2018-04-19 NOTE — DISCHARGE NOTE ADULT - MEDICATION SUMMARY - MEDICATIONS TO TAKE
I will START or STAY ON the medications listed below when I get home from the hospital:    losartan 100 mg oral tablet  -- 0.5 tab(s)= 50mg by mouth once a day in the evening  -- Indication: For HTN (hypertension)    Milk of Magnesia 8% oral suspension  -- 30 milliliter(s) by mouth , As Needed  -- Indication: For Antacid    doxazosin 4 mg oral tablet  -- 1 tab(s) by mouth once a day (in the evening)  -- Indication: For HTN (hypertension)    rosuvastatin 10 mg oral tablet  -- 1 tab(s) by mouth once a day (at bedtime)  -- Indication: For Hyperlipidemia    Metoprolol Succinate  mg oral tablet, extended release  -- 0.5 tab(s)= 50mg  by mouth once a day in the morning  -- Indication: For HTN (hypertension)    PriLOSEC OTC 20 mg oral delayed release tablet  -- 1 tab(s) by mouth once a day  -- Indication: For stomach I will START or STAY ON the medications listed below when I get home from the hospital:    losartan 100 mg oral tablet  -- 0.5 tab(s)= 50mg by mouth once a day in the evening  -- Indication: For HTN (hypertension)    Milk of Magnesia 8% oral suspension  -- 30 milliliter(s) by mouth , As Needed  -- Indication: For Antacid    doxazosin 4 mg oral tablet  -- 1 tab(s) by mouth once a day (in the evening)  -- Indication: For HTN (hypertension)    rosuvastatin 10 mg oral tablet  -- 1 tab(s) by mouth once a day (at bedtime)  -- Indication: For Hyperlipidemia    Metoprolol Succinate  mg oral tablet, extended release  -- 0.5 tab(s)= 50mg  by mouth once a day in the morning  -- Indication: For HTN (hypertension)    polyethylene glycol 3350 oral powder for reconstitution  -- 17 gram(s) by mouth once a day  -- Indication: For Consitpation    PriLOSEC OTC 20 mg oral delayed release tablet  -- 1 tab(s) by mouth once a day  -- Indication: For stomach I will START or STAY ON the medications listed below when I get home from the hospital:    losartan 100 mg oral tablet  -- 0.5 tab(s)= 50mg by mouth once a day in the evening  -- Indication: For HTN (hypertension)    Milk of Magnesia 8% oral suspension  -- 30 milliliter(s) by mouth , As Needed  -- Indication: For Constipation    doxazosin 4 mg oral tablet  -- 1 tab(s) by mouth once a day (in the evening)  -- Indication: For HTN (hypertension)    rosuvastatin 10 mg oral tablet  -- 1 tab(s) by mouth once a day (at bedtime)  -- Indication: For Hyperlipidemia    Metoprolol Succinate  mg oral tablet, extended release  -- 0.5 tab(s)= 50mg  by mouth once a day in the morning  -- Indication: For HTN (hypertension)    polyethylene glycol 3350 oral powder for reconstitution  -- 17 gram(s) by mouth once a day  -- Indication: For Consitpation    PriLOSEC OTC 20 mg oral delayed release tablet  -- 1 tab(s) by mouth once a day  -- Indication: For stomach

## 2018-04-19 NOTE — CHART NOTE - NSCHARTNOTEFT_GEN_A_CORE
Nutrition Note    Verbal consult received from Dr. Vasquez to provide discharge diet education for low fiber diet. RD provided verbal and written material regarding Low Fiber Nutrition Therapy, Pt and spouse asked questions as needed.     RD remains available.   Mary Cox, MS, RD, , [facility] #[phone]

## 2018-04-19 NOTE — PROGRESS NOTE ADULT - SUBJECTIVE AND OBJECTIVE BOX
Chief Complaint:  Patient is a 67y old  Male who presents with a chief complaint of GI hemorrhage (16 Apr 2018 08:20)      Interval Events:   Patient had a colonoscopy with stent placement.   He also had an EUS with liver biopsy yesterday.     Allergies:  sulfa drugs (Unknown)      Hospital Medications:  doxazosin 4 milliGRAM(s) Oral at bedtime  losartan 50 milliGRAM(s) Oral daily  metoprolol succinate ER 50 milliGRAM(s) Oral daily  pantoprazole  Injectable 40 milliGRAM(s) IV Push every 12 hours      PMHX/PSHX:  Malignant neoplasm of colon, unspecified part of colon  HTN (hypertension)  History of tonsillectomy      Family history:  Family history of breast cancer in mother (Mother)      ROS:     General:  No wt loss, fevers, chills, night sweats, fatigue,   Eyes:  Good vision, no reported pain  ENT:  No sore throat, pain, runny nose, dysphagia  CV:  No pain, palpitations, hypo/hypertension  Resp:  No dyspnea, cough, tachypnea, wheezing  GI:  See HPI  :  No pain, bleeding, incontinence, nocturia  Muscle:  No pain, weakness  Neuro:  No weakness, tingling, memory problems  Psych:  No fatigue, insomnia, mood problems, depression  Endocrine:  No polyuria, polydipsia, cold/heat intolerance  Heme:  No petechiae, ecchymosis, easy bruisability  Skin:  No rash, edema      PHYSICAL EXAM:     GENERAL:  Appears stated age, well-groomed, well-nourished, no distress  HEENT:  NC/AT,  conjunctivae clear, sclera -anicteric  CHEST:  Full & symmetric excursion, no increased effort, breath sounds clear  HEART:  Regular rhythm, S1, S2, no murmur/rub/S3/S4,  no edema  ABDOMEN:  Soft, non-tender, non-distended, normoactive bowel sounds,  no masses ,no hepato-splenomegaly,   EXTREMITIES:  no cyanosis,clubbing or edema  SKIN:  No rash/erythema/ecchymoses/petechiae/wounds/abscess/warm/dry  NEURO:  Alert, oriented    Vital Signs:  Vital Signs Last 24 Hrs  T(C): 36.7 (19 Apr 2018 07:49), Max: 36.8 (18 Apr 2018 22:45)  T(F): 98.1 (19 Apr 2018 07:49), Max: 98.2 (18 Apr 2018 22:45)  HR: 71 (19 Apr 2018 07:49) (71 - 122)  BP: 159/89 (19 Apr 2018 07:49) (143/87 - 159/89)  BP(mean): --  RR: 18 (19 Apr 2018 07:49) (18 - 20)  SpO2: 96% (19 Apr 2018 07:49) (91% - 96%)  Daily     Daily     LABS:                        11.5   11.02 )-----------( 206      ( 19 Apr 2018 07:31 )             34.4     04-19    138  |  102  |  15  ----------------------------<  101<H>  4.3   |  24  |  0.87    Ca    8.6      19 Apr 2018 06:39        PT/INR - ( 18 Apr 2018 09:17 )   PT: 12.0 sec;   INR: 1.06 ratio        Imaging:      < from: Colonoscopy (04.16.18 @ 09:16) >  Findings:       The perianal anddigital rectal examinations were normal.       An infiltrative nearly obstructing mass was found at the splenic flexure. The mass was        circumferential. The mass could not be traversed using the pediatric colonoscope. There was        contract oozing. Biopsies were taken with a cold forceps for histology.       Many medium-mouthed diverticula were found in the sigmoid colon.                                                                                                        Impression:     - Likely malignant nearly obstructing tumor at the splenic flexure. Biopsied.                        Unable to traverse using the colonoscope.                       - Diverticulosis in the sigmoid colon.  Recommendation:      - [facility]                       - Await pathology results.                       - Discuss options of palliative colon resection vs colonic stent for                        management of obstructing mass                       - Repeat colonoscopy.    < end of copied text >    < from: CT Abdomen and Pelvis w/ Oral Cont and w/ IV Cont (04.10.18 @ 15:37) >  LOWER CHEST: Pulmonary metastatic disease. A reference lesion in the   right lower lobe (2:22) 3.2 x 2.7 cm. Coronary calcification.    LIVER: Extensive bilateral lobar hepatic metastatic disease. A discrete   lesion in the right hepatic lobe (2:45) 4.4 x 3.8 cm.  BILE DUCTS: Normal caliber.  GALLBLADDER: Contracted.  SPLEEN: Within normal limits.  PANCREAS: Within normal limits.  ADRENALS: Within normal limits.  KIDNEYS/URETERS: Within normal limits.    BLADDER: Within normal limits.  REPRODUCTIVE ORGANS:Enlarged prostate.    BOWEL: No bowel obstruction. Sigmoid diverticulosis. Circumferential mass at the splenic flexure corresponding with patient's primary neoplasm.   Appendix is normal.  PERITONEUM: Trace ascites.  VESSELS:  Atherosclerotic changes.  RETROPERITONEUM: Extensive retroperitoneal adenopathy. A reference left para-aortic node (2:44) 3.1 x 2.3 cm    ABDOMINAL WALL: Within normal limits.  BONES: Degenerative changes of the spine. Bilateral hip a vascular   necrosis. No evidence ofcollapse.    IMPRESSION: Colonic mass with extensive metastatic disease as above.      < end of copied text >      < from: NM PET/CT Onc FDG Skull to Thigh, Inital (04.10.18 @ 15:33) >  IMPRESSION:      1.  FDG avid heterogeneous mural thickening distal transverse colon/splenic flexure probably represents primary malignancy.    2.  Widespread FDG avid hepatic lesions, retroperitoneal and mesenteric lymphadenopathy and peritoneal nodules consistent with metastatic disease.    3.  Small FDG avid foci in the pelvic bones and ribs, suspicious for osseous metastatic disease.    4.  FDG avid bilateral pulmonary nodules and mediastinal/hilar lymphadenopathy, probably metastatic. Heterogeneous interstitial prominence and groundglass opacities are nonspecific, possibly inflammatory/infectious, however lymphangitic metastatic disease is possible and incompletely evaluated due to nonbreath-hold technique.            < end of copied text >      < from: Colonoscopy (04.18.18 @ 14:03) >  Impression:          - Nearly obstructing tumor at the splenic flexure.            - Successful deployment of a WallFlex 25mm x 120mm metal stent.                       - No specimens collected.  Recommendation:      - [facility].                       - Full liquid diet today.                 - Repeat colonoscopy.                                                                                                          < end of copied text >        EUS  EGD/EUS:  - normal esophagus, stomach and duodenal bulb/2nd portion  - multiple large masses/metastases noted through the liver  - 3 passes of bx/FNA taken using a 22Fr Sharkcore needle

## 2018-04-19 NOTE — DISCHARGE NOTE ADULT - HOSPITAL COURSE
67 year old male with HTN, HLD, psoriasis, recently diagnosed colon mass, along with liver and lung masses (dx by imaging CT PET, mets to lung, liver with likely primary colon mass) presented to the emergency room with chief complain of bright red blood per rectum.  Bleeding likely 2/2 colon mass, s/p colonoscopy 4/16/2018 with likely malignant nearly obstructing tumor at the splenic flexure - unable to traverse.  Repeat colonoscopy on 4/18 with successful deployment of a metal stent  Patient also noted to have elevated liver enzymes, likely 2/2 metastatic lesions in the liver s/p EUS and liver biopsy on 4/18/2018.  Patient s/p right chest port placement via right internal jugular vein on 4/19 for initiation of chemotherapy.  Patient did not require transfusion during admission.  Hemoglobin is 11.5 on the day of discharge.  Slight leukocytosis is likely 2/2 neoplasm.  Patient is stable for discharge to home - to follow up with    Patient to follow up with his Oncologist within 1 week.  Patient to follow up with the Gastroenterologist within 10 for results of his biopsies. 67 year old male with HTN, HLD, psoriasis, recently diagnosed colon mass, along with liver and lung masses (dx by imaging CT PET, mets to lung, liver with likely primary colon mass) presented to the emergency room with chief complain of bright red blood per rectum.  Bleeding likely 2/2 colon mass, s/p colonoscopy 4/16/2018 with likely malignant nearly obstructing tumor at the splenic flexure - unable to traverse.  Repeat colonoscopy on 4/18 with successful deployment of a metal stent  Patient also noted to have elevated liver enzymes, likely 2/2 metastatic lesions in the liver s/p EUS and liver biopsy on 4/18/2018.  Patient s/p right chest port placement via right internal jugular vein on 4/19 for initiation of chemotherapy.  Patient did not require transfusion during admission.  Hemoglobin is 11.5 on the day of discharge.  Slight leukocytosis is likely 2/2 neoplasm.  Patient is stable for discharge to home - to follow up with his oncologist within 4-6 days for the results of his biopsies and further discussion regarding treatement.  Patient will additionally follow up with the Gastroenterologist within 1 week. 67 year old male with recently diagnosed colon ca (dx by imaging CT PET, mets to lung, liver with likely primary colon mass) pw bright red blood per rectum.      Admitted to medicine. Seen by GI- colonoscopy 4/16/2018 revealed malignant nearly obstructing tumor at the splenic flexure - unable to traverse.      Decision made to place stent after d/w Oncologist.     Repeat colonoscopy done on 4/18 with successful deployment of a metal colonic stent  Also underwent EUS and liver biopsy.     Bleeding resolved, Hb stabilized.      Had right chest port placement via right internal jugular vein on 4/19 for initiation of chemotherapy.      Discharged to follow up with his oncologist within 4-6 days for the results of his biopsies and further discussion regarding treatement.  Follow up with the Gastroenterologist within 1 week.    Diagnoses: Acute GI bleeding; Colon cancer; Liver metastases; Lung metastases; Dehydration; HTN

## 2018-04-19 NOTE — PROGRESS NOTE ADULT - PROBLEM SELECTOR PLAN 2
recently diagnosed, with extensive metastatic disease.    S/p colonic stent and biopsy placement yesterday. Mediport placed today. recently diagnosed, with extensive metastatic disease.    S/p colonic stent and EUS with biopsy yesterday. Mediport placed today.

## 2018-04-19 NOTE — PROGRESS NOTE ADULT - ASSESSMENT
67M with recently diagnosed metastatic Colon CA aw rectal bleeding. Colonoscopy on 4/16 revealed near obstructing circumferential mass at the splenic flexure, biopsied.     S/p colonic stent and biopsy placement yesterday. Mediport placed today. 67M with recently diagnosed metastatic Colon CA aw rectal bleeding. Colonoscopy on 4/16 revealed near obstructing circumferential mass at the splenic flexure, biopsied.     S/p colonic stent and EUS with liver biopsy yesterday. Mediport placed today.

## 2018-04-19 NOTE — DISCHARGE NOTE ADULT - ADDITIONAL INSTRUCTIONS
Follow up with your Oncologist within 4-6 days for results of your biopsies and further treatment discussions.  Follow up with the Gastroenterologist within 1 week.

## 2018-04-19 NOTE — PROGRESS NOTE ADULT - SUBJECTIVE AND OBJECTIVE BOX
Interventional Radiology Pre-Procedure Note    Procedure: Port placement    Diagnosis/Indication: Patient is a 67 year old gentleman with metastatic colon cancer presenting for port placement for initiation of chemotherapy. Patient has leukocytosis with no signs or symptoms of infection and likely secondary to neoplasm as discussed with medical team (Dr. Vasquez).    PAST MEDICAL & SURGICAL HISTORY:  Malignant neoplasm of colon, unspecified part of colon  HTN (hypertension)  History of tonsillectomy     Allergies: sulfa drugs (Unknown)    LABS:  CBC Full  -  ( 19 Apr 2018 07:31 )  WBC Count : 11.02 K/uL  Hemoglobin : 11.5 g/dL  Hematocrit : 34.4 %  Platelet Count - Automated : 206 K/uL  Mean Cell Volume : 88.0 fl  Mean Cell Hemoglobin : 29.4 pg  Mean Cell Hemoglobin Concentration : 33.4 gm/dL    04-19    138  |  102  |  15  ----------------------------<  101<H>  4.3   |  24  |  0.87    Ca    8.6      19 Apr 2018 06:39      PT/INR - ( 18 Apr 2018 09:17 )   PT: 12.0 sec;   INR: 1.06 ratio      Procedure/ risks/ benefits/ alternatives were explained, informed consent obtained from patient, verbalizes understanding.

## 2018-04-19 NOTE — DISCHARGE NOTE ADULT - CARE PROVIDERS DIRECT ADDRESSES
[email or web address]

## 2018-04-19 NOTE — DISCHARGE NOTE ADULT - PLAN OF CARE
Lower GI Bleed affects the colon and rectum.  Notify your doctor if you notice blood in your stool.      Take your medications as prescribed.  Avoid using NSAIDs (Aspirin, Ibuprofen, Advil, Motrin, Aleve) unless your Health Care Provider tells you that it is ok.    Follow up with the Gastroenterologist within 10 days for the results of your biopsies. Follow up with your Oncologist within 1 week.  Bring the results of your biopsies to this appointment. Continue to follow a low salt/sodium diet.  Perform physical activities as tolerated in consultation with your Primary Care Provider and physical therapist.  Take all medications as prescribed.  Follow up with your medical doctor for routine blood pressure monitoring at your next visit.  Notify your doctor if you have any of the following symptoms:  Dizziness, lightheadedness, blurry vision, headache, chest pain, or shortness of breath. Patient will no longer experience bleeding. Lower GI Bleed affects the colon and rectum.  Notify your doctor if you notice blood in your stool.      Take your medications as prescribed.  Avoid using NSAIDs (Aspirin, Ibuprofen, Advil, Motrin, Aleve) unless your Health Care Provider tells you that it is ok. Leave mediport dressing in place - it will fall off by itself.  Keep the dressing dry and avoid getting this area wet during showers.    Follow up with your Oncologist within 4-6 days for results of your biopsies and further treatment discussions.    Follow up with the Gastroenterologist within 1 week.

## 2018-04-19 NOTE — DISCHARGE NOTE ADULT - CARE PLAN
Principal Discharge DX:	Gastrointestinal hemorrhage, unspecified gastrointestinal hemorrhage type  Assessment and plan of treatment:	Lower GI Bleed affects the colon and rectum.  Notify your doctor if you notice blood in your stool.      Take your medications as prescribed.  Avoid using NSAIDs (Aspirin, Ibuprofen, Advil, Motrin, Aleve) unless your Health Care Provider tells you that it is ok.    Follow up with the Gastroenterologist within 10 days for the results of your biopsies.  Secondary Diagnosis:	Malignant neoplasm of colon, unspecified part of colon  Assessment and plan of treatment:	Follow up with your Oncologist within 1 week.  Bring the results of your biopsies to this appointment.  Secondary Diagnosis:	HTN (hypertension)  Assessment and plan of treatment:	Continue to follow a low salt/sodium diet.  Perform physical activities as tolerated in consultation with your Primary Care Provider and physical therapist.  Take all medications as prescribed.  Follow up with your medical doctor for routine blood pressure monitoring at your next visit.  Notify your doctor if you have any of the following symptoms:  Dizziness, lightheadedness, blurry vision, headache, chest pain, or shortness of breath. Principal Discharge DX:	Gastrointestinal hemorrhage, unspecified gastrointestinal hemorrhage type  Goal:	Patient will no longer experience bleeding.  Assessment and plan of treatment:	Lower GI Bleed affects the colon and rectum.  Notify your doctor if you notice blood in your stool.      Take your medications as prescribed.  Avoid using NSAIDs (Aspirin, Ibuprofen, Advil, Motrin, Aleve) unless your Health Care Provider tells you that it is ok.  Secondary Diagnosis:	Malignant neoplasm of colon, unspecified part of colon  Assessment and plan of treatment:	Leave mediport dressing in place - it will fall off by itself.  Keep the dressing dry and avoid getting this area wet during showers.    Follow up with your Oncologist within 4-6 days for results of your biopsies and further treatment discussions.    Follow up with the Gastroenterologist within 1 week.  Secondary Diagnosis:	HTN (hypertension)  Assessment and plan of treatment:	Continue to follow a low salt/sodium diet.  Perform physical activities as tolerated in consultation with your Primary Care Provider and physical therapist.  Take all medications as prescribed.  Follow up with your medical doctor for routine blood pressure monitoring at your next visit.  Notify your doctor if you have any of the following symptoms:  Dizziness, lightheadedness, blurry vision, headache, chest pain, or shortness of breath.

## 2018-04-19 NOTE — PROGRESS NOTE ADULT - SUBJECTIVE AND OBJECTIVE BOX
Patient is a 67y old  Male who presents with a chief complaint of GI hemorrhage (19 Apr 2018 11:41)    HPI: S/p colonic stent and biopsy placement yesterday. Mediport placed today. Feels well.     Vital Signs Last 24 Hrs  T(C): 36.6 (19 Apr 2018 11:34), Max: 36.8 (18 Apr 2018 22:45)  T(F): 97.8 (19 Apr 2018 11:34), Max: 98.2 (18 Apr 2018 22:45)  HR: 76 (19 Apr 2018 11:34) (71 - 122)  BP: 146/84 (19 Apr 2018 11:34) (138/78 - 159/89)  BP(mean): --  RR: 18 (19 Apr 2018 12:06) (18 - 20)  SpO2: 91% (19 Apr 2018 12:06) (91% - 96%)                          11.5   11.02 )-----------( 206      ( 19 Apr 2018 07:31 )             34.4     04-19    138  |  102  |  15  ----------------------------<  101<H>  4.3   |  24  |  0.87    Ca    8.6      19 Apr 2018 06:39      MEDICATIONS  (STANDING):  doxazosin 4 milliGRAM(s) Oral at bedtime  losartan 50 milliGRAM(s) Oral daily  metoprolol succinate ER 50 milliGRAM(s) Oral daily  pantoprazole  Injectable 40 milliGRAM(s) IV Push every 12 hours  polyethylene glycol 3350 17 Gram(s) Oral daily  Saliva Substitute (CAPHOSOL) 30 milliLiter(s) Swish and Spit three times a day    MEDICATIONS  (PRN): Patient is a 67y old  Male who presents with a chief complaint of GI hemorrhage (19 Apr 2018 11:41)    HPI: S/p colonic stent and EUS with biopsy yesterday. Mediport placed today. Feels well.     Vital Signs Last 24 Hrs  T(C): 36.6 (19 Apr 2018 11:34), Max: 36.8 (18 Apr 2018 22:45)  T(F): 97.8 (19 Apr 2018 11:34), Max: 98.2 (18 Apr 2018 22:45)  HR: 76 (19 Apr 2018 11:34) (71 - 122)  BP: 146/84 (19 Apr 2018 11:34) (138/78 - 159/89)  BP(mean): --  RR: 18 (19 Apr 2018 12:06) (18 - 20)  SpO2: 91% (19 Apr 2018 12:06) (91% - 96%)                          11.5   11.02 )-----------( 206      ( 19 Apr 2018 07:31 )             34.4     04-19    138  |  102  |  15  ----------------------------<  101<H>  4.3   |  24  |  0.87    Ca    8.6      19 Apr 2018 06:39      MEDICATIONS  (STANDING):  doxazosin 4 milliGRAM(s) Oral at bedtime  losartan 50 milliGRAM(s) Oral daily  metoprolol succinate ER 50 milliGRAM(s) Oral daily  pantoprazole  Injectable 40 milliGRAM(s) IV Push every 12 hours  polyethylene glycol 3350 17 Gram(s) Oral daily  Saliva Substitute (CAPHOSOL) 30 milliLiter(s) Swish and Spit three times a day    MEDICATIONS  (PRN):

## 2018-04-19 NOTE — PROGRESS NOTE ADULT - PROBLEM SELECTOR PROBLEM 2
Colon obstruction
Malignant neoplasm of colon, unspecified part of colon

## 2018-04-19 NOTE — PROGRESS NOTE ADULT - ATTENDING COMMENTS
I have reviewed CT. Patient will benefit from colonic stent as the lesion is near obstructing. While we can wait until this obstructs, it is likely imminent. Patient agreeable to doing stent. In addition, based on CT findings will plan for EUS guided core biopsy of liver lesion.
D/c home today. F/u with Oncology. D/c time 45 minutes.

## 2018-04-19 NOTE — DISCHARGE NOTE ADULT - PATIENT PORTAL LINK FT
You can access the [facility], offered by [facility], by registering with the following website: [email or web address]

## 2018-04-20 ENCOUNTER — OTHER (OUTPATIENT)
Age: 68
End: 2018-04-20

## 2018-04-20 DIAGNOSIS — J06.9 ACUTE UPPER RESPIRATORY INFECTION, UNSPECIFIED: ICD-10-CM

## 2018-04-20 PROBLEM — I10 ESSENTIAL (PRIMARY) HYPERTENSION: Chronic | Status: ACTIVE | Noted: 2018-04-14

## 2018-04-20 RX ORDER — AZITHROMYCIN 250 MG/1
250 TABLET, FILM COATED ORAL
Qty: 6 | Refills: 0 | Status: ACTIVE | COMMUNITY
Start: 2018-04-20 | End: 1900-01-01

## 2018-04-23 ENCOUNTER — APPOINTMENT (OUTPATIENT)
Dept: HEMATOLOGY ONCOLOGY | Facility: CLINIC | Age: 68
End: 2018-04-23
Payer: MEDICARE

## 2018-04-23 VITALS
OXYGEN SATURATION: 95 % | SYSTOLIC BLOOD PRESSURE: 147 MMHG | RESPIRATION RATE: 16 BRPM | BODY MASS INDEX: 28.02 KG/M2 | TEMPERATURE: 97.4 F | DIASTOLIC BLOOD PRESSURE: 81 MMHG | HEART RATE: 104 BPM | WEIGHT: 193.11 LBS

## 2018-04-23 PROCEDURE — 99214 OFFICE O/P EST MOD 30 MIN: CPT

## 2018-04-23 NOTE — H&P ADULT - SKIN
Please leave rx with [facility] when ready. Thanks.    Norco  mg      Last Written Prescription Date:  03/07/18  Last Fill Quantity: 60,   # refills: 0  Last Office Visit: 02/13/18  Future Office visit:       Routing refill request to provider for review/approval because:  Drug not on the [facility], [facility] or [facility] refill protocol or controlled substance    Marvin, RN  Triage Nurse             detailed exam warm and dry

## 2018-04-25 ENCOUNTER — OTHER (OUTPATIENT)
Age: 68
End: 2018-04-25

## 2018-04-25 RX ORDER — PROCHLORPERAZINE MALEATE 10 MG/1
10 TABLET ORAL
Qty: 90 | Refills: 0 | Status: ACTIVE | COMMUNITY
Start: 2018-04-25 | End: 1900-01-01

## 2018-04-26 ENCOUNTER — OTHER (OUTPATIENT)
Age: 68
End: 2018-04-26

## 2018-04-26 ENCOUNTER — LABORATORY RESULT (OUTPATIENT)
Age: 68
End: 2018-04-26

## 2018-04-26 ENCOUNTER — RESULT REVIEW (OUTPATIENT)
Age: 68
End: 2018-04-26

## 2018-04-26 ENCOUNTER — APPOINTMENT (OUTPATIENT)
Dept: INFUSION THERAPY | Facility: HOSPITAL | Age: 68
End: 2018-04-26

## 2018-04-26 LAB
BASOPHILS # BLD AUTO: 0 K/UL — SIGNIFICANT CHANGE UP (ref 0–0.2)
BASOPHILS NFR BLD AUTO: 0.4 % — SIGNIFICANT CHANGE UP (ref 0–2)
EOSINOPHIL # BLD AUTO: 0.2 K/UL — SIGNIFICANT CHANGE UP (ref 0–0.5)
EOSINOPHIL NFR BLD AUTO: 1.5 % — SIGNIFICANT CHANGE UP (ref 0–6)
HCT VFR BLD CALC: 34.1 % — LOW (ref 39–50)
HGB BLD-MCNC: 11.9 G/DL — LOW (ref 13–17)
LYMPHOCYTES # BLD AUTO: 1.2 K/UL — SIGNIFICANT CHANGE UP (ref 1–3.3)
LYMPHOCYTES # BLD AUTO: 10 % — LOW (ref 13–44)
MCHC RBC-ENTMCNC: 31.4 PG — SIGNIFICANT CHANGE UP (ref 27–34)
MCHC RBC-ENTMCNC: 35 G/DL — SIGNIFICANT CHANGE UP (ref 32–36)
MCV RBC AUTO: 89.7 FL — SIGNIFICANT CHANGE UP (ref 80–100)
MONOCYTES # BLD AUTO: 1 K/UL — HIGH (ref 0–0.9)
MONOCYTES NFR BLD AUTO: 7.9 % — SIGNIFICANT CHANGE UP (ref 2–14)
NEUTROPHILS # BLD AUTO: 9.9 K/UL — HIGH (ref 1.8–7.4)
NEUTROPHILS NFR BLD AUTO: 80.2 % — HIGH (ref 43–77)
PLATELET # BLD AUTO: 185 K/UL — SIGNIFICANT CHANGE UP (ref 150–400)
RBC # BLD: 3.8 M/UL — LOW (ref 4.2–5.8)
RBC # FLD: 13.1 % — SIGNIFICANT CHANGE UP (ref 10.3–14.5)
WBC # BLD: 12.3 K/UL — HIGH (ref 3.8–10.5)
WBC # FLD AUTO: 12.3 K/UL — HIGH (ref 3.8–10.5)

## 2018-04-26 RX ORDER — METOPROLOL TARTRATE 50 MG
0.5 TABLET ORAL
Qty: 0 | Refills: 0 | COMMUNITY

## 2018-04-26 RX ORDER — MAGNESIUM HYDROXIDE 400 MG/1
30 TABLET, CHEWABLE ORAL
Qty: 0 | Refills: 0 | COMMUNITY

## 2018-04-26 RX ORDER — LOSARTAN POTASSIUM 100 MG/1
0.5 TABLET, FILM COATED ORAL
Qty: 0 | Refills: 0 | COMMUNITY

## 2018-04-26 RX ORDER — APREPITANT 80 MG/1
80 CAPSULE ORAL
Qty: 2 | Refills: 7 | Status: ACTIVE | COMMUNITY
Start: 2018-04-26 | End: 1900-01-01

## 2018-04-26 RX ORDER — OMEPRAZOLE 10 MG/1
1 CAPSULE, DELAYED RELEASE ORAL
Qty: 0 | Refills: 0 | COMMUNITY

## 2018-04-26 RX ORDER — ROSUVASTATIN CALCIUM 5 MG/1
1 TABLET ORAL
Qty: 0 | Refills: 0 | COMMUNITY

## 2018-04-27 DIAGNOSIS — R11.2 NAUSEA WITH VOMITING, UNSPECIFIED: ICD-10-CM

## 2018-04-27 DIAGNOSIS — Z51.11 ENCOUNTER FOR ANTINEOPLASTIC CHEMOTHERAPY: ICD-10-CM

## 2018-04-28 ENCOUNTER — APPOINTMENT (OUTPATIENT)
Dept: INFUSION THERAPY | Facility: HOSPITAL | Age: 68
End: 2018-04-28

## 2018-05-03 ENCOUNTER — OTHER (OUTPATIENT)
Age: 68
End: 2018-05-03

## 2018-05-03 ENCOUNTER — RESULT REVIEW (OUTPATIENT)
Age: 68
End: 2018-05-03

## 2018-05-03 ENCOUNTER — APPOINTMENT (OUTPATIENT)
Dept: HEMATOLOGY ONCOLOGY | Facility: CLINIC | Age: 68
End: 2018-05-03
Payer: MEDICARE

## 2018-05-03 VITALS
BODY MASS INDEX: 27.83 KG/M2 | TEMPERATURE: 97.4 F | HEART RATE: 88 BPM | DIASTOLIC BLOOD PRESSURE: 70 MMHG | WEIGHT: 191.8 LBS | RESPIRATION RATE: 16 BRPM | SYSTOLIC BLOOD PRESSURE: 130 MMHG | OXYGEN SATURATION: 95 %

## 2018-05-03 LAB
BASOPHILS # BLD AUTO: 0 K/UL — SIGNIFICANT CHANGE UP (ref 0–0.2)
BASOPHILS NFR BLD AUTO: 0.3 % — SIGNIFICANT CHANGE UP (ref 0–2)
EOSINOPHIL # BLD AUTO: 0.2 K/UL — SIGNIFICANT CHANGE UP (ref 0–0.5)
EOSINOPHIL NFR BLD AUTO: 2.1 % — SIGNIFICANT CHANGE UP (ref 0–6)
HCT VFR BLD CALC: 33.3 % — LOW (ref 39–50)
HGB BLD-MCNC: 11.3 G/DL — LOW (ref 13–17)
LYMPHOCYTES # BLD AUTO: 1.2 K/UL — SIGNIFICANT CHANGE UP (ref 1–3.3)
LYMPHOCYTES # BLD AUTO: 10.5 % — LOW (ref 13–44)
MCHC RBC-ENTMCNC: 30.2 PG — SIGNIFICANT CHANGE UP (ref 27–34)
MCHC RBC-ENTMCNC: 33.8 G/DL — SIGNIFICANT CHANGE UP (ref 32–36)
MCV RBC AUTO: 89.4 FL — SIGNIFICANT CHANGE UP (ref 80–100)
MONOCYTES # BLD AUTO: 0.3 K/UL — SIGNIFICANT CHANGE UP (ref 0–0.9)
MONOCYTES NFR BLD AUTO: 2.6 % — SIGNIFICANT CHANGE UP (ref 2–14)
NEUTROPHILS # BLD AUTO: 9.7 K/UL — HIGH (ref 1.8–7.4)
NEUTROPHILS NFR BLD AUTO: 84.4 % — HIGH (ref 43–77)
PLATELET # BLD AUTO: 109 K/UL — LOW (ref 150–400)
RBC # BLD: 3.73 M/UL — LOW (ref 4.2–5.8)
RBC # FLD: 13.2 % — SIGNIFICANT CHANGE UP (ref 10.3–14.5)
WBC # BLD: 11.5 K/UL — HIGH (ref 3.8–10.5)
WBC # FLD AUTO: 11.5 K/UL — HIGH (ref 3.8–10.5)

## 2018-05-03 PROCEDURE — 99214 OFFICE O/P EST MOD 30 MIN: CPT

## 2018-05-08 ENCOUNTER — OUTPATIENT (OUTPATIENT)
Dept: OUTPATIENT SERVICES | Facility: HOSPITAL | Age: 68
LOS: 1 days | Discharge: HOME | End: 2018-05-08

## 2018-05-08 DIAGNOSIS — C18.9 MALIGNANT NEOPLASM OF COLON, UNSPECIFIED: ICD-10-CM

## 2018-05-08 DIAGNOSIS — Z98.890 OTHER SPECIFIED POSTPROCEDURAL STATES: Chronic | ICD-10-CM

## 2018-05-10 ENCOUNTER — APPOINTMENT (OUTPATIENT)
Dept: INFUSION THERAPY | Facility: HOSPITAL | Age: 68
End: 2018-05-10

## 2018-05-10 ENCOUNTER — LABORATORY RESULT (OUTPATIENT)
Age: 68
End: 2018-05-10

## 2018-05-10 ENCOUNTER — RESULT REVIEW (OUTPATIENT)
Age: 68
End: 2018-05-10

## 2018-05-10 LAB
BASO STIPL BLD QL SMEAR: PRESENT — SIGNIFICANT CHANGE UP
BASOPHILS # BLD AUTO: 0 K/UL — SIGNIFICANT CHANGE UP (ref 0–0.2)
EOSINOPHIL # BLD AUTO: 0.1 K/UL — SIGNIFICANT CHANGE UP (ref 0–0.5)
EOSINOPHIL NFR BLD AUTO: 2 % — SIGNIFICANT CHANGE UP (ref 0–6)
HCT VFR BLD CALC: 32.1 % — LOW (ref 39–50)
HGB BLD-MCNC: 11 G/DL — LOW (ref 13–17)
LYMPHOCYTES # BLD AUTO: 1.4 K/UL — SIGNIFICANT CHANGE UP (ref 1–3.3)
LYMPHOCYTES # BLD AUTO: 38 % — SIGNIFICANT CHANGE UP (ref 13–44)
MCHC RBC-ENTMCNC: 29.7 PG — SIGNIFICANT CHANGE UP (ref 27–34)
MCHC RBC-ENTMCNC: 34.1 G/DL — SIGNIFICANT CHANGE UP (ref 32–36)
MCV RBC AUTO: 87.1 FL — SIGNIFICANT CHANGE UP (ref 80–100)
MONOCYTES # BLD AUTO: 1.1 K/UL — HIGH (ref 0–0.9)
MONOCYTES NFR BLD AUTO: 31 % — HIGH (ref 2–14)
NEUTROPHILS # BLD AUTO: 1 K/UL — LOW (ref 1.8–7.4)
NEUTROPHILS NFR BLD AUTO: 24 % — LOW (ref 43–77)
NEUTS BAND # BLD: 5 % — SIGNIFICANT CHANGE UP (ref 0–8)
PLAT MORPH BLD: NORMAL — SIGNIFICANT CHANGE UP
PLATELET # BLD AUTO: 210 K/UL — SIGNIFICANT CHANGE UP (ref 150–400)
RBC # BLD: 3.69 M/UL — LOW (ref 4.2–5.8)
RBC # FLD: 13.6 % — SIGNIFICANT CHANGE UP (ref 10.3–14.5)
RBC BLD AUTO: SIGNIFICANT CHANGE UP
WBC # BLD: 3.7 K/UL — LOW (ref 3.8–10.5)
WBC # FLD AUTO: 3.7 K/UL — LOW (ref 3.8–10.5)

## 2018-05-12 ENCOUNTER — APPOINTMENT (OUTPATIENT)
Dept: INFUSION THERAPY | Facility: HOSPITAL | Age: 68
End: 2018-05-12

## 2018-05-16 ENCOUNTER — FORM ENCOUNTER (OUTPATIENT)
Age: 68
End: 2018-05-16

## 2018-05-17 ENCOUNTER — OTHER (OUTPATIENT)
Age: 68
End: 2018-05-17

## 2018-05-17 ENCOUNTER — APPOINTMENT (OUTPATIENT)
Dept: INFUSION THERAPY | Facility: HOSPITAL | Age: 68
End: 2018-05-17

## 2018-05-17 ENCOUNTER — LABORATORY RESULT (OUTPATIENT)
Age: 68
End: 2018-05-17

## 2018-05-17 ENCOUNTER — RESULT REVIEW (OUTPATIENT)
Age: 68
End: 2018-05-17

## 2018-05-17 ENCOUNTER — APPOINTMENT (OUTPATIENT)
Dept: RADIOLOGY | Facility: IMAGING CENTER | Age: 68
End: 2018-05-17
Payer: MEDICARE

## 2018-05-17 ENCOUNTER — OUTPATIENT (OUTPATIENT)
Dept: OUTPATIENT SERVICES | Facility: HOSPITAL | Age: 68
LOS: 1 days | End: 2018-05-17
Payer: MEDICARE

## 2018-05-17 DIAGNOSIS — Z98.890 OTHER SPECIFIED POSTPROCEDURAL STATES: Chronic | ICD-10-CM

## 2018-05-17 DIAGNOSIS — C18.9 MALIGNANT NEOPLASM OF COLON, UNSPECIFIED: ICD-10-CM

## 2018-05-17 LAB
BASOPHILS # BLD AUTO: 0.1 K/UL — SIGNIFICANT CHANGE UP (ref 0–0.2)
BASOPHILS NFR BLD AUTO: 0.3 % — SIGNIFICANT CHANGE UP (ref 0–2)
CEA SERPL-MCNC: 64.9 NG/ML
EOSINOPHIL # BLD AUTO: 0.1 K/UL — SIGNIFICANT CHANGE UP (ref 0–0.5)
EOSINOPHIL NFR BLD AUTO: 0.8 % — SIGNIFICANT CHANGE UP (ref 0–6)
HCT VFR BLD CALC: 31.8 % — LOW (ref 39–50)
HGB BLD-MCNC: 10.7 G/DL — LOW (ref 13–17)
LYMPHOCYTES # BLD AUTO: 1.7 K/UL — SIGNIFICANT CHANGE UP (ref 1–3.3)
LYMPHOCYTES # BLD AUTO: 10.8 % — LOW (ref 13–44)
MCHC RBC-ENTMCNC: 29.2 PG — SIGNIFICANT CHANGE UP (ref 27–34)
MCHC RBC-ENTMCNC: 33.6 G/DL — SIGNIFICANT CHANGE UP (ref 32–36)
MCV RBC AUTO: 86.8 FL — SIGNIFICANT CHANGE UP (ref 80–100)
MONOCYTES # BLD AUTO: 1.1 K/UL — HIGH (ref 0–0.9)
MONOCYTES NFR BLD AUTO: 6.8 % — SIGNIFICANT CHANGE UP (ref 2–14)
NEUTROPHILS # BLD AUTO: 12.8 K/UL — HIGH (ref 1.8–7.4)
NEUTROPHILS NFR BLD AUTO: 81.3 % — HIGH (ref 43–77)
PLATELET # BLD AUTO: 281 K/UL — SIGNIFICANT CHANGE UP (ref 150–400)
RBC # BLD: 3.66 M/UL — LOW (ref 4.2–5.8)
RBC # FLD: 14.4 % — SIGNIFICANT CHANGE UP (ref 10.3–14.5)
WBC # BLD: 15.8 K/UL — HIGH (ref 3.8–10.5)
WBC # FLD AUTO: 15.8 K/UL — HIGH (ref 3.8–10.5)

## 2018-05-17 PROCEDURE — 71046 X-RAY EXAM CHEST 2 VIEWS: CPT | Mod: 26

## 2018-05-17 PROCEDURE — 71046 X-RAY EXAM CHEST 2 VIEWS: CPT

## 2018-05-18 LAB
ALBUMIN SERPL ELPH-MCNC: 3.2 G/DL
ALP BLD-CCNC: 243 U/L
ALT SERPL-CCNC: 36 U/L
ANION GAP SERPL CALC-SCNC: 13 MMOL/L
AST SERPL-CCNC: 41 U/L
BILIRUB SERPL-MCNC: 0.6 MG/DL
BUN SERPL-MCNC: 20 MG/DL
CALCIUM SERPL-MCNC: 8.1 MG/DL
CHLORIDE SERPL-SCNC: 97 MMOL/L
CO2 SERPL-SCNC: 25 MMOL/L
CREAT SERPL-MCNC: 0.71 MG/DL
GLUCOSE SERPL-MCNC: 161 MG/DL
POTASSIUM SERPL-SCNC: 4.7 MMOL/L
PROT SERPL-MCNC: 5.6 G/DL
SODIUM SERPL-SCNC: 135 MMOL/L

## 2018-05-22 ENCOUNTER — RESULT REVIEW (OUTPATIENT)
Age: 68
End: 2018-05-22

## 2018-05-22 ENCOUNTER — LABORATORY RESULT (OUTPATIENT)
Age: 68
End: 2018-05-22

## 2018-05-22 ENCOUNTER — APPOINTMENT (OUTPATIENT)
Dept: INFUSION THERAPY | Facility: HOSPITAL | Age: 68
End: 2018-05-22

## 2018-05-22 LAB
BASOPHILS # BLD AUTO: 0.1 K/UL — SIGNIFICANT CHANGE UP (ref 0–0.2)
BASOPHILS NFR BLD AUTO: 0.4 % — SIGNIFICANT CHANGE UP (ref 0–2)
EOSINOPHIL # BLD AUTO: 0.2 K/UL — SIGNIFICANT CHANGE UP (ref 0–0.5)
EOSINOPHIL NFR BLD AUTO: 1.2 % — SIGNIFICANT CHANGE UP (ref 0–6)
HCT VFR BLD CALC: 30.9 % — LOW (ref 39–50)
HGB BLD-MCNC: 10.5 G/DL — LOW (ref 13–17)
LYMPHOCYTES # BLD AUTO: 1.6 K/UL — SIGNIFICANT CHANGE UP (ref 1–3.3)
LYMPHOCYTES # BLD AUTO: 10.5 % — LOW (ref 13–44)
MCHC RBC-ENTMCNC: 29.7 PG — SIGNIFICANT CHANGE UP (ref 27–34)
MCHC RBC-ENTMCNC: 34 G/DL — SIGNIFICANT CHANGE UP (ref 32–36)
MCV RBC AUTO: 87.4 FL — SIGNIFICANT CHANGE UP (ref 80–100)
MONOCYTES # BLD AUTO: 0.9 K/UL — SIGNIFICANT CHANGE UP (ref 0–0.9)
MONOCYTES NFR BLD AUTO: 6.2 % — SIGNIFICANT CHANGE UP (ref 2–14)
NEUTROPHILS # BLD AUTO: 12.1 K/UL — HIGH (ref 1.8–7.4)
NEUTROPHILS NFR BLD AUTO: 81.8 % — HIGH (ref 43–77)
PLATELET # BLD AUTO: 230 K/UL — SIGNIFICANT CHANGE UP (ref 150–400)
RBC # BLD: 3.53 M/UL — LOW (ref 4.2–5.8)
RBC # FLD: 14.7 % — HIGH (ref 10.3–14.5)
WBC # BLD: 14.9 K/UL — HIGH (ref 3.8–10.5)
WBC # FLD AUTO: 14.9 K/UL — HIGH (ref 3.8–10.5)

## 2018-05-23 DIAGNOSIS — R11.2 NAUSEA WITH VOMITING, UNSPECIFIED: ICD-10-CM

## 2018-05-23 DIAGNOSIS — Z51.11 ENCOUNTER FOR ANTINEOPLASTIC CHEMOTHERAPY: ICD-10-CM

## 2018-05-23 PROCEDURE — 88341 IMHCHEM/IMCYTCHM EA ADD ANTB: CPT

## 2018-05-23 PROCEDURE — C1894: CPT

## 2018-05-23 PROCEDURE — C1769: CPT

## 2018-05-23 PROCEDURE — 85027 COMPLETE CBC AUTOMATED: CPT

## 2018-05-23 PROCEDURE — C1788: CPT

## 2018-05-23 PROCEDURE — 88307 TISSUE EXAM BY PATHOLOGIST: CPT

## 2018-05-23 PROCEDURE — 80048 BASIC METABOLIC PNL TOTAL CA: CPT

## 2018-05-23 PROCEDURE — 85730 THROMBOPLASTIN TIME PARTIAL: CPT

## 2018-05-23 PROCEDURE — 80061 LIPID PANEL: CPT

## 2018-05-23 PROCEDURE — 71045 X-RAY EXAM CHEST 1 VIEW: CPT

## 2018-05-23 PROCEDURE — 99285 EMERGENCY DEPT VISIT HI MDM: CPT

## 2018-05-23 PROCEDURE — 76000 FLUOROSCOPY <1 HR PHYS/QHP: CPT

## 2018-05-23 PROCEDURE — 86901 BLOOD TYPING SEROLOGIC RH(D): CPT

## 2018-05-23 PROCEDURE — 86850 RBC ANTIBODY SCREEN: CPT

## 2018-05-23 PROCEDURE — 86900 BLOOD TYPING SEROLOGIC ABO: CPT

## 2018-05-23 PROCEDURE — 76937 US GUIDE VASCULAR ACCESS: CPT

## 2018-05-23 PROCEDURE — 80053 COMPREHEN METABOLIC PANEL: CPT

## 2018-05-23 PROCEDURE — 88305 TISSUE EXAM BY PATHOLOGIST: CPT

## 2018-05-23 PROCEDURE — C1876: CPT

## 2018-05-23 PROCEDURE — 36561 INSERT TUNNELED CV CATH: CPT

## 2018-05-23 PROCEDURE — 85610 PROTHROMBIN TIME: CPT

## 2018-05-23 PROCEDURE — 77001 FLUOROGUIDE FOR VEIN DEVICE: CPT

## 2018-05-24 ENCOUNTER — APPOINTMENT (OUTPATIENT)
Dept: INFUSION THERAPY | Facility: HOSPITAL | Age: 68
End: 2018-05-24

## 2018-05-25 DIAGNOSIS — Z51.89 ENCOUNTER FOR OTHER SPECIFIED AFTERCARE: ICD-10-CM

## 2018-05-31 ENCOUNTER — OTHER (OUTPATIENT)
Age: 68
End: 2018-05-31

## 2018-06-01 ENCOUNTER — RESULT REVIEW (OUTPATIENT)
Age: 68
End: 2018-06-01

## 2018-06-01 ENCOUNTER — APPOINTMENT (OUTPATIENT)
Dept: HEMATOLOGY ONCOLOGY | Facility: CLINIC | Age: 68
End: 2018-06-01
Payer: MEDICARE

## 2018-06-01 VITALS
BODY MASS INDEX: 28.73 KG/M2 | RESPIRATION RATE: 16 BRPM | TEMPERATURE: 98 F | DIASTOLIC BLOOD PRESSURE: 72 MMHG | HEART RATE: 96 BPM | OXYGEN SATURATION: 96 % | SYSTOLIC BLOOD PRESSURE: 130 MMHG | WEIGHT: 197.98 LBS

## 2018-06-01 LAB
BASOPHILS # BLD AUTO: 0 K/UL — SIGNIFICANT CHANGE UP (ref 0–0.2)
BASOPHILS NFR BLD AUTO: 0.3 % — SIGNIFICANT CHANGE UP (ref 0–2)
EOSINOPHIL # BLD AUTO: 0.2 K/UL — SIGNIFICANT CHANGE UP (ref 0–0.5)
EOSINOPHIL NFR BLD AUTO: 2 % — SIGNIFICANT CHANGE UP (ref 0–6)
HCT VFR BLD CALC: 30.6 % — LOW (ref 39–50)
HGB BLD-MCNC: 10.2 G/DL — LOW (ref 13–17)
LYMPHOCYTES # BLD AUTO: 1.8 K/UL — SIGNIFICANT CHANGE UP (ref 1–3.3)
LYMPHOCYTES # BLD AUTO: 18.3 % — SIGNIFICANT CHANGE UP (ref 13–44)
MCHC RBC-ENTMCNC: 29.2 PG — SIGNIFICANT CHANGE UP (ref 27–34)
MCHC RBC-ENTMCNC: 33.4 G/DL — SIGNIFICANT CHANGE UP (ref 32–36)
MCV RBC AUTO: 87.2 FL — SIGNIFICANT CHANGE UP (ref 80–100)
MONOCYTES # BLD AUTO: 0.7 K/UL — SIGNIFICANT CHANGE UP (ref 0–0.9)
MONOCYTES NFR BLD AUTO: 7.2 % — SIGNIFICANT CHANGE UP (ref 2–14)
NEUTROPHILS # BLD AUTO: 7 K/UL — SIGNIFICANT CHANGE UP (ref 1.8–7.4)
NEUTROPHILS NFR BLD AUTO: 72.1 % — SIGNIFICANT CHANGE UP (ref 43–77)
PLATELET # BLD AUTO: 138 K/UL — LOW (ref 150–400)
RBC # BLD: 3.51 M/UL — LOW (ref 4.2–5.8)
RBC # FLD: 15.7 % — HIGH (ref 10.3–14.5)
WBC # BLD: 9.7 K/UL — SIGNIFICANT CHANGE UP (ref 3.8–10.5)
WBC # FLD AUTO: 9.7 K/UL — SIGNIFICANT CHANGE UP (ref 3.8–10.5)

## 2018-06-01 PROCEDURE — 99214 OFFICE O/P EST MOD 30 MIN: CPT

## 2018-06-05 ENCOUNTER — LABORATORY RESULT (OUTPATIENT)
Age: 68
End: 2018-06-05

## 2018-06-05 ENCOUNTER — RESULT REVIEW (OUTPATIENT)
Age: 68
End: 2018-06-05

## 2018-06-05 ENCOUNTER — APPOINTMENT (OUTPATIENT)
Dept: INFUSION THERAPY | Facility: HOSPITAL | Age: 68
End: 2018-06-05

## 2018-06-05 LAB
ALBUMIN SERPL ELPH-MCNC: 3.1 G/DL
ALP BLD-CCNC: 132 U/L
ALT SERPL-CCNC: 16 U/L
ANION GAP SERPL CALC-SCNC: 11 MMOL/L
AST SERPL-CCNC: 25 U/L
BASOPHILS # BLD AUTO: 0 K/UL — SIGNIFICANT CHANGE UP (ref 0–0.2)
BASOPHILS NFR BLD AUTO: 0.3 % — SIGNIFICANT CHANGE UP (ref 0–2)
BILIRUB SERPL-MCNC: 0.2 MG/DL
BUN SERPL-MCNC: 19 MG/DL
CALCIUM SERPL-MCNC: 8.9 MG/DL
CEA SERPL-MCNC: 22.5 NG/ML
CHLORIDE SERPL-SCNC: 101 MMOL/L
CO2 SERPL-SCNC: 27 MMOL/L
CREAT SERPL-MCNC: 0.71 MG/DL
EOSINOPHIL # BLD AUTO: 0.1 K/UL — SIGNIFICANT CHANGE UP (ref 0–0.5)
EOSINOPHIL NFR BLD AUTO: 1.2 % — SIGNIFICANT CHANGE UP (ref 0–6)
GLUCOSE SERPL-MCNC: 136 MG/DL
HCT VFR BLD CALC: 32 % — LOW (ref 39–50)
HGB BLD-MCNC: 10.6 G/DL — LOW (ref 13–17)
LYMPHOCYTES # BLD AUTO: 1.7 K/UL — SIGNIFICANT CHANGE UP (ref 1–3.3)
LYMPHOCYTES # BLD AUTO: 14.8 % — SIGNIFICANT CHANGE UP (ref 13–44)
MCHC RBC-ENTMCNC: 28.6 PG — SIGNIFICANT CHANGE UP (ref 27–34)
MCHC RBC-ENTMCNC: 33.2 G/DL — SIGNIFICANT CHANGE UP (ref 32–36)
MCV RBC AUTO: 86 FL — SIGNIFICANT CHANGE UP (ref 80–100)
MONOCYTES # BLD AUTO: 0.8 K/UL — SIGNIFICANT CHANGE UP (ref 0–0.9)
MONOCYTES NFR BLD AUTO: 7.2 % — SIGNIFICANT CHANGE UP (ref 2–14)
NEUTROPHILS # BLD AUTO: 9 K/UL — HIGH (ref 1.8–7.4)
NEUTROPHILS NFR BLD AUTO: 76.5 % — SIGNIFICANT CHANGE UP (ref 43–77)
PLATELET # BLD AUTO: 138 K/UL — LOW (ref 150–400)
POTASSIUM SERPL-SCNC: 5.1 MMOL/L
PROT SERPL-MCNC: 5.5 G/DL
RBC # BLD: 3.71 M/UL — LOW (ref 4.2–5.8)
RBC # FLD: 15.9 % — HIGH (ref 10.3–14.5)
SODIUM SERPL-SCNC: 139 MMOL/L
WBC # BLD: 11.7 K/UL — HIGH (ref 3.8–10.5)
WBC # FLD AUTO: 11.7 K/UL — HIGH (ref 3.8–10.5)

## 2018-06-07 ENCOUNTER — APPOINTMENT (OUTPATIENT)
Dept: INFUSION THERAPY | Facility: HOSPITAL | Age: 68
End: 2018-06-07

## 2018-06-14 ENCOUNTER — APPOINTMENT (OUTPATIENT)
Dept: GASTROENTEROLOGY | Facility: CLINIC | Age: 68
End: 2018-06-14
Payer: MEDICARE

## 2018-06-14 VITALS
BODY MASS INDEX: 29.33 KG/M2 | HEART RATE: 97 BPM | HEIGHT: 69 IN | OXYGEN SATURATION: 96 % | SYSTOLIC BLOOD PRESSURE: 130 MMHG | RESPIRATION RATE: 15 BRPM | DIASTOLIC BLOOD PRESSURE: 71 MMHG | TEMPERATURE: 97.4 F | WEIGHT: 198 LBS

## 2018-06-14 DIAGNOSIS — K21.9 GASTRO-ESOPHAGEAL REFLUX DISEASE W/OUT ESOPHAGITIS: ICD-10-CM

## 2018-06-14 PROCEDURE — 99214 OFFICE O/P EST MOD 30 MIN: CPT

## 2018-06-14 RX ORDER — OMEPRAZOLE 40 MG/1
40 CAPSULE, DELAYED RELEASE ORAL
Qty: 30 | Refills: 5 | Status: ACTIVE | COMMUNITY
Start: 2018-06-14 | End: 1900-01-01

## 2018-06-19 ENCOUNTER — OUTPATIENT (OUTPATIENT)
Dept: OUTPATIENT SERVICES | Facility: HOSPITAL | Age: 68
LOS: 1 days | Discharge: HOME | End: 2018-06-19

## 2018-06-19 DIAGNOSIS — Z98.890 OTHER SPECIFIED POSTPROCEDURAL STATES: Chronic | ICD-10-CM

## 2018-06-19 DIAGNOSIS — C18.9 MALIGNANT NEOPLASM OF COLON, UNSPECIFIED: ICD-10-CM

## 2018-06-21 ENCOUNTER — RESULT REVIEW (OUTPATIENT)
Age: 68
End: 2018-06-21

## 2018-06-21 ENCOUNTER — LABORATORY RESULT (OUTPATIENT)
Age: 68
End: 2018-06-21

## 2018-06-21 ENCOUNTER — APPOINTMENT (OUTPATIENT)
Dept: INFUSION THERAPY | Facility: HOSPITAL | Age: 68
End: 2018-06-21

## 2018-06-21 LAB
BASOPHILS # BLD AUTO: 0 K/UL — SIGNIFICANT CHANGE UP (ref 0–0.2)
BASOPHILS NFR BLD AUTO: 0.3 % — SIGNIFICANT CHANGE UP (ref 0–2)
EOSINOPHIL # BLD AUTO: 0.2 K/UL — SIGNIFICANT CHANGE UP (ref 0–0.5)
EOSINOPHIL NFR BLD AUTO: 1.3 % — SIGNIFICANT CHANGE UP (ref 0–6)
HCT VFR BLD CALC: 30.2 % — LOW (ref 39–50)
HGB BLD-MCNC: 10.4 G/DL — LOW (ref 13–17)
LYMPHOCYTES # BLD AUTO: 14.7 % — SIGNIFICANT CHANGE UP (ref 13–44)
LYMPHOCYTES # BLD AUTO: 2 K/UL — SIGNIFICANT CHANGE UP (ref 1–3.3)
MCHC RBC-ENTMCNC: 29.6 PG — SIGNIFICANT CHANGE UP (ref 27–34)
MCHC RBC-ENTMCNC: 34.3 G/DL — SIGNIFICANT CHANGE UP (ref 32–36)
MCV RBC AUTO: 86.3 FL — SIGNIFICANT CHANGE UP (ref 80–100)
MONOCYTES # BLD AUTO: 0.8 K/UL — SIGNIFICANT CHANGE UP (ref 0–0.9)
MONOCYTES NFR BLD AUTO: 6.2 % — SIGNIFICANT CHANGE UP (ref 2–14)
NEUTROPHILS # BLD AUTO: 10.5 K/UL — HIGH (ref 1.8–7.4)
NEUTROPHILS NFR BLD AUTO: 77.6 % — HIGH (ref 43–77)
PLATELET # BLD AUTO: 171 K/UL — SIGNIFICANT CHANGE UP (ref 150–400)
RBC # BLD: 3.5 M/UL — LOW (ref 4.2–5.8)
RBC # FLD: 17.3 % — HIGH (ref 10.3–14.5)
WBC # BLD: 13.5 K/UL — HIGH (ref 3.8–10.5)
WBC # FLD AUTO: 13.5 K/UL — HIGH (ref 3.8–10.5)

## 2018-06-22 DIAGNOSIS — R11.2 NAUSEA WITH VOMITING, UNSPECIFIED: ICD-10-CM

## 2018-06-22 DIAGNOSIS — Z51.11 ENCOUNTER FOR ANTINEOPLASTIC CHEMOTHERAPY: ICD-10-CM

## 2018-06-23 ENCOUNTER — APPOINTMENT (OUTPATIENT)
Dept: INFUSION THERAPY | Facility: HOSPITAL | Age: 68
End: 2018-06-23

## 2018-06-26 DIAGNOSIS — Z51.89 ENCOUNTER FOR OTHER SPECIFIED AFTERCARE: ICD-10-CM

## 2018-07-05 ENCOUNTER — APPOINTMENT (OUTPATIENT)
Dept: INFUSION THERAPY | Facility: HOSPITAL | Age: 68
End: 2018-07-05

## 2018-07-05 ENCOUNTER — LABORATORY RESULT (OUTPATIENT)
Age: 68
End: 2018-07-05

## 2018-07-05 ENCOUNTER — RESULT REVIEW (OUTPATIENT)
Age: 68
End: 2018-07-05

## 2018-07-05 LAB
BASOPHILS # BLD AUTO: 0 K/UL — SIGNIFICANT CHANGE UP (ref 0–0.2)
BASOPHILS NFR BLD AUTO: 0.3 % — SIGNIFICANT CHANGE UP (ref 0–2)
EOSINOPHIL # BLD AUTO: 0.2 K/UL — SIGNIFICANT CHANGE UP (ref 0–0.5)
EOSINOPHIL NFR BLD AUTO: 1.4 % — SIGNIFICANT CHANGE UP (ref 0–6)
HCT VFR BLD CALC: 32.5 % — LOW (ref 39–50)
HGB BLD-MCNC: 10.4 G/DL — LOW (ref 13–17)
LYMPHOCYTES # BLD AUTO: 1.9 K/UL — SIGNIFICANT CHANGE UP (ref 1–3.3)
LYMPHOCYTES # BLD AUTO: 14 % — SIGNIFICANT CHANGE UP (ref 13–44)
MCHC RBC-ENTMCNC: 28 PG — SIGNIFICANT CHANGE UP (ref 27–34)
MCHC RBC-ENTMCNC: 31.9 G/DL — LOW (ref 32–36)
MCV RBC AUTO: 87.6 FL — SIGNIFICANT CHANGE UP (ref 80–100)
MONOCYTES # BLD AUTO: 1.1 K/UL — HIGH (ref 0–0.9)
MONOCYTES NFR BLD AUTO: 8.1 % — SIGNIFICANT CHANGE UP (ref 2–14)
NEUTROPHILS # BLD AUTO: 10.2 K/UL — HIGH (ref 1.8–7.4)
NEUTROPHILS NFR BLD AUTO: 76.2 % — SIGNIFICANT CHANGE UP (ref 43–77)
PLATELET # BLD AUTO: 161 K/UL — SIGNIFICANT CHANGE UP (ref 150–400)
RBC # BLD: 3.72 M/UL — LOW (ref 4.2–5.8)
RBC # FLD: 19 % — HIGH (ref 10.3–14.5)
WBC # BLD: 13.3 K/UL — HIGH (ref 3.8–10.5)
WBC # FLD AUTO: 13.3 K/UL — HIGH (ref 3.8–10.5)

## 2018-07-06 ENCOUNTER — APPOINTMENT (OUTPATIENT)
Dept: HEMATOLOGY ONCOLOGY | Facility: CLINIC | Age: 68
End: 2018-07-06
Payer: MEDICARE

## 2018-07-06 ENCOUNTER — APPOINTMENT (OUTPATIENT)
Dept: HEMATOLOGY ONCOLOGY | Facility: CLINIC | Age: 68
End: 2018-07-06

## 2018-07-06 VITALS
RESPIRATION RATE: 16 BRPM | SYSTOLIC BLOOD PRESSURE: 145 MMHG | DIASTOLIC BLOOD PRESSURE: 81 MMHG | TEMPERATURE: 97.7 F | WEIGHT: 199.72 LBS | OXYGEN SATURATION: 97 % | BODY MASS INDEX: 29.49 KG/M2 | HEART RATE: 93 BPM

## 2018-07-06 PROCEDURE — 99214 OFFICE O/P EST MOD 30 MIN: CPT

## 2018-07-07 ENCOUNTER — APPOINTMENT (OUTPATIENT)
Dept: INFUSION THERAPY | Facility: HOSPITAL | Age: 68
End: 2018-07-07

## 2018-07-19 ENCOUNTER — RESULT REVIEW (OUTPATIENT)
Age: 68
End: 2018-07-19

## 2018-07-19 ENCOUNTER — LABORATORY RESULT (OUTPATIENT)
Age: 68
End: 2018-07-19

## 2018-07-19 ENCOUNTER — APPOINTMENT (OUTPATIENT)
Dept: INFUSION THERAPY | Facility: HOSPITAL | Age: 68
End: 2018-07-19

## 2018-07-19 LAB
BASOPHILS # BLD AUTO: 0 K/UL — SIGNIFICANT CHANGE UP (ref 0–0.2)
BASOPHILS NFR BLD AUTO: 0.4 % — SIGNIFICANT CHANGE UP (ref 0–2)
EOSINOPHIL # BLD AUTO: 0.1 K/UL — SIGNIFICANT CHANGE UP (ref 0–0.5)
EOSINOPHIL NFR BLD AUTO: 1.2 % — SIGNIFICANT CHANGE UP (ref 0–6)
HCT VFR BLD CALC: 30.9 % — LOW (ref 39–50)
HGB BLD-MCNC: 10.4 G/DL — LOW (ref 13–17)
LYMPHOCYTES # BLD AUTO: 1.8 K/UL — SIGNIFICANT CHANGE UP (ref 1–3.3)
LYMPHOCYTES # BLD AUTO: 15 % — SIGNIFICANT CHANGE UP (ref 13–44)
MCHC RBC-ENTMCNC: 29.5 PG — SIGNIFICANT CHANGE UP (ref 27–34)
MCHC RBC-ENTMCNC: 33.7 G/DL — SIGNIFICANT CHANGE UP (ref 32–36)
MCV RBC AUTO: 87.7 FL — SIGNIFICANT CHANGE UP (ref 80–100)
MONOCYTES # BLD AUTO: 1.2 K/UL — HIGH (ref 0–0.9)
MONOCYTES NFR BLD AUTO: 10.2 % — SIGNIFICANT CHANGE UP (ref 2–14)
NEUTROPHILS # BLD AUTO: 8.9 K/UL — HIGH (ref 1.8–7.4)
NEUTROPHILS NFR BLD AUTO: 73.2 % — SIGNIFICANT CHANGE UP (ref 43–77)
PLATELET # BLD AUTO: 110 K/UL — LOW (ref 150–400)
RBC # BLD: 3.52 M/UL — LOW (ref 4.2–5.8)
RBC # FLD: 19.4 % — HIGH (ref 10.3–14.5)
WBC # BLD: 12.1 K/UL — HIGH (ref 3.8–10.5)
WBC # FLD AUTO: 12.1 K/UL — HIGH (ref 3.8–10.5)

## 2018-07-20 ENCOUNTER — OUTPATIENT (OUTPATIENT)
Dept: OUTPATIENT SERVICES | Facility: HOSPITAL | Age: 68
LOS: 1 days | Discharge: HOME | End: 2018-07-20

## 2018-07-20 DIAGNOSIS — C18.9 MALIGNANT NEOPLASM OF COLON, UNSPECIFIED: ICD-10-CM

## 2018-07-20 DIAGNOSIS — Z98.890 OTHER SPECIFIED POSTPROCEDURAL STATES: Chronic | ICD-10-CM

## 2018-07-21 ENCOUNTER — APPOINTMENT (OUTPATIENT)
Dept: INFUSION THERAPY | Facility: HOSPITAL | Age: 68
End: 2018-07-21

## 2018-07-23 DIAGNOSIS — Z51.89 ENCOUNTER FOR OTHER SPECIFIED AFTERCARE: ICD-10-CM

## 2018-07-29 ENCOUNTER — FORM ENCOUNTER (OUTPATIENT)
Age: 68
End: 2018-07-29

## 2018-07-30 ENCOUNTER — APPOINTMENT (OUTPATIENT)
Dept: CT IMAGING | Facility: CLINIC | Age: 68
End: 2018-07-30
Payer: MEDICARE

## 2018-07-30 ENCOUNTER — OUTPATIENT (OUTPATIENT)
Dept: OUTPATIENT SERVICES | Facility: HOSPITAL | Age: 68
LOS: 1 days | End: 2018-07-30
Payer: MEDICARE

## 2018-07-30 DIAGNOSIS — C18.9 MALIGNANT NEOPLASM OF COLON, UNSPECIFIED: ICD-10-CM

## 2018-07-30 DIAGNOSIS — Z98.890 OTHER SPECIFIED POSTPROCEDURAL STATES: Chronic | ICD-10-CM

## 2018-07-30 PROCEDURE — 74177 CT ABD & PELVIS W/CONTRAST: CPT | Mod: 26

## 2018-07-30 PROCEDURE — 74177 CT ABD & PELVIS W/CONTRAST: CPT

## 2018-07-30 PROCEDURE — 71260 CT THORAX DX C+: CPT

## 2018-07-30 PROCEDURE — 71260 CT THORAX DX C+: CPT | Mod: 26

## 2018-07-30 PROCEDURE — 82565 ASSAY OF CREATININE: CPT

## 2018-07-31 ENCOUNTER — OTHER (OUTPATIENT)
Age: 68
End: 2018-07-31

## 2018-08-01 ENCOUNTER — APPOINTMENT (OUTPATIENT)
Dept: HEMATOLOGY ONCOLOGY | Facility: CLINIC | Age: 68
End: 2018-08-01
Payer: MEDICARE

## 2018-08-01 VITALS
RESPIRATION RATE: 17 BRPM | SYSTOLIC BLOOD PRESSURE: 157 MMHG | TEMPERATURE: 99 F | DIASTOLIC BLOOD PRESSURE: 86 MMHG | OXYGEN SATURATION: 96 % | BODY MASS INDEX: 29.3 KG/M2 | WEIGHT: 198.41 LBS | HEART RATE: 103 BPM

## 2018-08-01 PROCEDURE — 99214 OFFICE O/P EST MOD 30 MIN: CPT

## 2018-08-09 ENCOUNTER — RESULT REVIEW (OUTPATIENT)
Age: 68
End: 2018-08-09

## 2018-08-09 ENCOUNTER — LABORATORY RESULT (OUTPATIENT)
Age: 68
End: 2018-08-09

## 2018-08-09 ENCOUNTER — APPOINTMENT (OUTPATIENT)
Dept: INFUSION THERAPY | Facility: HOSPITAL | Age: 68
End: 2018-08-09

## 2018-08-09 LAB
BASOPHILS # BLD AUTO: 0.1 K/UL — SIGNIFICANT CHANGE UP (ref 0–0.2)
BASOPHILS NFR BLD AUTO: 0.5 % — SIGNIFICANT CHANGE UP (ref 0–2)
EOSINOPHIL # BLD AUTO: 0.1 K/UL — SIGNIFICANT CHANGE UP (ref 0–0.5)
EOSINOPHIL NFR BLD AUTO: 1.2 % — SIGNIFICANT CHANGE UP (ref 0–6)
HCT VFR BLD CALC: 30.4 % — LOW (ref 39–50)
HGB BLD-MCNC: 10 G/DL — LOW (ref 13–17)
LYMPHOCYTES # BLD AUTO: 1.4 K/UL — SIGNIFICANT CHANGE UP (ref 1–3.3)
LYMPHOCYTES # BLD AUTO: 13.1 % — SIGNIFICANT CHANGE UP (ref 13–44)
MCHC RBC-ENTMCNC: 28.9 PG — SIGNIFICANT CHANGE UP (ref 27–34)
MCHC RBC-ENTMCNC: 32.7 G/DL — SIGNIFICANT CHANGE UP (ref 32–36)
MCV RBC AUTO: 88.5 FL — SIGNIFICANT CHANGE UP (ref 80–100)
MONOCYTES # BLD AUTO: 1 K/UL — HIGH (ref 0–0.9)
MONOCYTES NFR BLD AUTO: 9.7 % — SIGNIFICANT CHANGE UP (ref 2–14)
NEUTROPHILS # BLD AUTO: 8.1 K/UL — HIGH (ref 1.8–7.4)
NEUTROPHILS NFR BLD AUTO: 75.5 % — SIGNIFICANT CHANGE UP (ref 43–77)
PLATELET # BLD AUTO: 160 K/UL — SIGNIFICANT CHANGE UP (ref 150–400)
RBC # BLD: 3.44 M/UL — LOW (ref 4.2–5.8)
RBC # FLD: 19.5 % — HIGH (ref 10.3–14.5)
WBC # BLD: 10.7 K/UL — HIGH (ref 3.8–10.5)
WBC # FLD AUTO: 10.7 K/UL — HIGH (ref 3.8–10.5)

## 2018-08-10 DIAGNOSIS — R11.2 NAUSEA WITH VOMITING, UNSPECIFIED: ICD-10-CM

## 2018-08-10 DIAGNOSIS — Z51.11 ENCOUNTER FOR ANTINEOPLASTIC CHEMOTHERAPY: ICD-10-CM

## 2018-08-11 ENCOUNTER — APPOINTMENT (OUTPATIENT)
Dept: INFUSION THERAPY | Facility: HOSPITAL | Age: 68
End: 2018-08-11

## 2018-08-11 ENCOUNTER — LABORATORY RESULT (OUTPATIENT)
Age: 68
End: 2018-08-11

## 2018-08-14 ENCOUNTER — OUTPATIENT (OUTPATIENT)
Dept: OUTPATIENT SERVICES | Facility: HOSPITAL | Age: 68
LOS: 1 days | Discharge: HOME | End: 2018-08-14

## 2018-08-14 DIAGNOSIS — Z98.890 OTHER SPECIFIED POSTPROCEDURAL STATES: Chronic | ICD-10-CM

## 2018-08-14 DIAGNOSIS — C18.9 MALIGNANT NEOPLASM OF COLON, UNSPECIFIED: ICD-10-CM

## 2018-08-23 ENCOUNTER — RESULT REVIEW (OUTPATIENT)
Age: 68
End: 2018-08-23

## 2018-08-23 ENCOUNTER — LABORATORY RESULT (OUTPATIENT)
Age: 68
End: 2018-08-23

## 2018-08-23 ENCOUNTER — APPOINTMENT (OUTPATIENT)
Dept: INFUSION THERAPY | Facility: HOSPITAL | Age: 68
End: 2018-08-23

## 2018-08-23 LAB
BASOPHILS # BLD AUTO: 0 K/UL — SIGNIFICANT CHANGE UP (ref 0–0.2)
BASOPHILS NFR BLD AUTO: 0.3 % — SIGNIFICANT CHANGE UP (ref 0–2)
EOSINOPHIL # BLD AUTO: 0.2 K/UL — SIGNIFICANT CHANGE UP (ref 0–0.5)
EOSINOPHIL NFR BLD AUTO: 3.2 % — SIGNIFICANT CHANGE UP (ref 0–6)
HCT VFR BLD CALC: 32.2 % — LOW (ref 39–50)
HGB BLD-MCNC: 10.6 G/DL — LOW (ref 13–17)
LYMPHOCYTES # BLD AUTO: 0.9 K/UL — LOW (ref 1–3.3)
LYMPHOCYTES # BLD AUTO: 17.4 % — SIGNIFICANT CHANGE UP (ref 13–44)
MCHC RBC-ENTMCNC: 29.4 PG — SIGNIFICANT CHANGE UP (ref 27–34)
MCHC RBC-ENTMCNC: 33 G/DL — SIGNIFICANT CHANGE UP (ref 32–36)
MCV RBC AUTO: 89.3 FL — SIGNIFICANT CHANGE UP (ref 80–100)
MONOCYTES # BLD AUTO: 0.9 K/UL — SIGNIFICANT CHANGE UP (ref 0–0.9)
MONOCYTES NFR BLD AUTO: 16.3 % — HIGH (ref 2–14)
NEUTROPHILS # BLD AUTO: 3.4 K/UL — SIGNIFICANT CHANGE UP (ref 1.8–7.4)
NEUTROPHILS NFR BLD AUTO: 62.8 % — SIGNIFICANT CHANGE UP (ref 43–77)
PLATELET # BLD AUTO: 108 K/UL — LOW (ref 150–400)
RBC # BLD: 3.6 M/UL — LOW (ref 4.2–5.8)
RBC # FLD: 18.9 % — HIGH (ref 10.3–14.5)
WBC # BLD: 5.4 K/UL — SIGNIFICANT CHANGE UP (ref 3.8–10.5)
WBC # FLD AUTO: 5.4 K/UL — SIGNIFICANT CHANGE UP (ref 3.8–10.5)

## 2018-08-24 DIAGNOSIS — Z51.11 ENCOUNTER FOR ANTINEOPLASTIC CHEMOTHERAPY: ICD-10-CM

## 2018-08-24 DIAGNOSIS — R11.2 NAUSEA WITH VOMITING, UNSPECIFIED: ICD-10-CM

## 2018-08-25 ENCOUNTER — APPOINTMENT (OUTPATIENT)
Dept: INFUSION THERAPY | Facility: HOSPITAL | Age: 68
End: 2018-08-25

## 2018-08-31 ENCOUNTER — OTHER (OUTPATIENT)
Age: 68
End: 2018-08-31

## 2018-08-31 RX ORDER — DIPHENHYDRAMINE HYDROCHLORIDE AND LIDOCAINE HYDROCHLORIDE AND ALUMINUM HYDROXIDE AND MAGNESIUM HYDRO
KIT EVERY 6 HOURS
Qty: 1 | Refills: 3 | Status: ACTIVE | COMMUNITY
Start: 2018-08-31 | End: 1900-01-01

## 2018-09-06 ENCOUNTER — RESULT REVIEW (OUTPATIENT)
Age: 68
End: 2018-09-06

## 2018-09-06 ENCOUNTER — APPOINTMENT (OUTPATIENT)
Dept: INFUSION THERAPY | Facility: HOSPITAL | Age: 68
End: 2018-09-06

## 2018-09-06 LAB
ANISOCYTOSIS BLD QL: SLIGHT — SIGNIFICANT CHANGE UP
BASOPHILS # BLD AUTO: 0 K/UL — SIGNIFICANT CHANGE UP (ref 0–0.2)
DACRYOCYTES BLD QL SMEAR: SLIGHT — SIGNIFICANT CHANGE UP
ELLIPTOCYTES BLD QL SMEAR: SLIGHT — SIGNIFICANT CHANGE UP
EOSINOPHIL # BLD AUTO: 0 K/UL — SIGNIFICANT CHANGE UP (ref 0–0.5)
EOSINOPHIL NFR BLD AUTO: 2 % — SIGNIFICANT CHANGE UP (ref 0–6)
HCT VFR BLD CALC: 30.8 % — LOW (ref 39–50)
HGB BLD-MCNC: 10.2 G/DL — LOW (ref 13–17)
HYPOCHROMIA BLD QL: SLIGHT — SIGNIFICANT CHANGE UP
LG PLATELETS BLD QL AUTO: SLIGHT — SIGNIFICANT CHANGE UP
LYMPHOCYTES # BLD AUTO: 0.6 K/UL — LOW (ref 1–3.3)
LYMPHOCYTES # BLD AUTO: 41 % — SIGNIFICANT CHANGE UP (ref 13–44)
MACROCYTES BLD QL: SLIGHT — SIGNIFICANT CHANGE UP
MCHC RBC-ENTMCNC: 29.7 PG — SIGNIFICANT CHANGE UP (ref 27–34)
MCHC RBC-ENTMCNC: 33.2 G/DL — SIGNIFICANT CHANGE UP (ref 32–36)
MCV RBC AUTO: 89.4 FL — SIGNIFICANT CHANGE UP (ref 80–100)
MONOCYTES # BLD AUTO: 0.5 K/UL — SIGNIFICANT CHANGE UP (ref 0–0.9)
MONOCYTES NFR BLD AUTO: 21 % — HIGH (ref 2–14)
NEUTROPHILS # BLD AUTO: 0.6 K/UL — LOW (ref 1.8–7.4)
NEUTROPHILS NFR BLD AUTO: 35 % — LOW (ref 43–77)
NEUTS BAND # BLD: 1 % — SIGNIFICANT CHANGE UP (ref 0–8)
PLAT MORPH BLD: NORMAL — SIGNIFICANT CHANGE UP
PLATELET # BLD AUTO: 68 K/UL — LOW (ref 150–400)
POIKILOCYTOSIS BLD QL AUTO: SLIGHT — SIGNIFICANT CHANGE UP
POLYCHROMASIA BLD QL SMEAR: SLIGHT — SIGNIFICANT CHANGE UP
RBC # BLD: 3.44 M/UL — LOW (ref 4.2–5.8)
RBC # FLD: 18.2 % — HIGH (ref 10.3–14.5)
RBC BLD AUTO: ABNORMAL
WBC # BLD: 1.8 K/UL — LOW (ref 3.8–10.5)
WBC # FLD AUTO: 1.8 K/UL — LOW (ref 3.8–10.5)

## 2018-09-07 ENCOUNTER — LABORATORY RESULT (OUTPATIENT)
Age: 68
End: 2018-09-07

## 2018-09-07 ENCOUNTER — RESULT REVIEW (OUTPATIENT)
Age: 68
End: 2018-09-07

## 2018-09-07 ENCOUNTER — APPOINTMENT (OUTPATIENT)
Dept: HEMATOLOGY ONCOLOGY | Facility: CLINIC | Age: 68
End: 2018-09-07
Payer: MEDICARE

## 2018-09-07 ENCOUNTER — APPOINTMENT (OUTPATIENT)
Dept: INFUSION THERAPY | Facility: HOSPITAL | Age: 68
End: 2018-09-07

## 2018-09-07 DIAGNOSIS — T45.1X5A AGRANULOCYTOSIS SECONDARY TO CANCER CHEMOTHERAPY: ICD-10-CM

## 2018-09-07 DIAGNOSIS — D70.1 AGRANULOCYTOSIS SECONDARY TO CANCER CHEMOTHERAPY: ICD-10-CM

## 2018-09-07 LAB
ANISOCYTOSIS BLD QL: SLIGHT — SIGNIFICANT CHANGE UP
BASOPHILS # BLD AUTO: 0 K/UL — SIGNIFICANT CHANGE UP (ref 0–0.2)
DACRYOCYTES BLD QL SMEAR: SLIGHT — SIGNIFICANT CHANGE UP
ELLIPTOCYTES BLD QL SMEAR: SLIGHT — SIGNIFICANT CHANGE UP
EOSINOPHIL # BLD AUTO: 0 K/UL — SIGNIFICANT CHANGE UP (ref 0–0.5)
HCT VFR BLD CALC: 32.5 % — LOW (ref 39–50)
HGB BLD-MCNC: 10.5 G/DL — LOW (ref 13–17)
HYPOCHROMIA BLD QL: SLIGHT — SIGNIFICANT CHANGE UP
LG PLATELETS BLD QL AUTO: SLIGHT — SIGNIFICANT CHANGE UP
LYMPHOCYTES # BLD AUTO: 0.6 K/UL — LOW (ref 1–3.3)
LYMPHOCYTES # BLD AUTO: 26 % — SIGNIFICANT CHANGE UP (ref 13–44)
MACROCYTES BLD QL: SLIGHT — SIGNIFICANT CHANGE UP
MCHC RBC-ENTMCNC: 29.1 PG — SIGNIFICANT CHANGE UP (ref 27–34)
MCHC RBC-ENTMCNC: 32.2 G/DL — SIGNIFICANT CHANGE UP (ref 32–36)
MCV RBC AUTO: 90.3 FL — SIGNIFICANT CHANGE UP (ref 80–100)
MONOCYTES # BLD AUTO: 0.9 K/UL — SIGNIFICANT CHANGE UP (ref 0–0.9)
MONOCYTES NFR BLD AUTO: 23 % — HIGH (ref 2–14)
NEUTROPHILS # BLD AUTO: 1.4 K/UL — LOW (ref 1.8–7.4)
NEUTROPHILS NFR BLD AUTO: 43 % — SIGNIFICANT CHANGE UP (ref 43–77)
NEUTS BAND # BLD: 8 % — SIGNIFICANT CHANGE UP (ref 0–8)
PLAT MORPH BLD: NORMAL — SIGNIFICANT CHANGE UP
PLATELET # BLD AUTO: 85 K/UL — LOW (ref 150–400)
POIKILOCYTOSIS BLD QL AUTO: SLIGHT — SIGNIFICANT CHANGE UP
POLYCHROMASIA BLD QL SMEAR: SLIGHT — SIGNIFICANT CHANGE UP
RBC # BLD: 3.6 M/UL — LOW (ref 4.2–5.8)
RBC # FLD: 18.1 % — HIGH (ref 10.3–14.5)
RBC BLD AUTO: ABNORMAL
SCHISTOCYTES BLD QL AUTO: SLIGHT — SIGNIFICANT CHANGE UP
WBC # BLD: 2.9 K/UL — LOW (ref 3.8–10.5)
WBC # FLD AUTO: 2.9 K/UL — LOW (ref 3.8–10.5)

## 2018-09-07 PROCEDURE — 99214 OFFICE O/P EST MOD 30 MIN: CPT

## 2018-09-07 RX ORDER — AMOXICILLIN AND CLAVULANATE POTASSIUM 875; 125 MG/1; MG/1
875-125 TABLET, COATED ORAL
Qty: 14 | Refills: 0 | Status: ACTIVE | COMMUNITY
Start: 2018-05-17 | End: 1900-01-01

## 2018-09-08 ENCOUNTER — APPOINTMENT (OUTPATIENT)
Dept: INFUSION THERAPY | Facility: HOSPITAL | Age: 68
End: 2018-09-08

## 2018-09-09 ENCOUNTER — EMERGENCY (EMERGENCY)
Facility: HOSPITAL | Age: 68
LOS: 1 days | Discharge: HOME | End: 2018-09-09
Attending: EMERGENCY MEDICINE
Payer: MEDICARE

## 2018-09-09 VITALS
DIASTOLIC BLOOD PRESSURE: 79 MMHG | HEART RATE: 99 BPM | RESPIRATION RATE: 18 BRPM | SYSTOLIC BLOOD PRESSURE: 132 MMHG | OXYGEN SATURATION: 94 % | TEMPERATURE: 98 F

## 2018-09-09 DIAGNOSIS — Z98.890 OTHER SPECIFIED POSTPROCEDURAL STATES: Chronic | ICD-10-CM

## 2018-09-09 LAB
ALBUMIN SERPL ELPH-MCNC: 2.5 G/DL — LOW (ref 3.3–5)
ALP SERPL-CCNC: 136 U/L — HIGH (ref 40–120)
ALT FLD-CCNC: 43 U/L — SIGNIFICANT CHANGE UP (ref 10–45)
ANION GAP SERPL CALC-SCNC: 16 MMOL/L — SIGNIFICANT CHANGE UP (ref 5–17)
AST SERPL-CCNC: 117 U/L — HIGH (ref 10–40)
BASOPHILS # BLD AUTO: 0 K/UL — SIGNIFICANT CHANGE UP (ref 0–0.2)
BASOPHILS NFR BLD AUTO: 0 % — SIGNIFICANT CHANGE UP (ref 0–2)
BILIRUB SERPL-MCNC: 0.5 MG/DL — SIGNIFICANT CHANGE UP (ref 0.2–1.2)
BUN SERPL-MCNC: 16 MG/DL — SIGNIFICANT CHANGE UP (ref 7–23)
CALCIUM SERPL-MCNC: 8.5 MG/DL — SIGNIFICANT CHANGE UP (ref 8.4–10.5)
CHLORIDE SERPL-SCNC: 102 MMOL/L — SIGNIFICANT CHANGE UP (ref 96–108)
CO2 SERPL-SCNC: 20 MMOL/L — LOW (ref 22–31)
CREAT SERPL-MCNC: 1.07 MG/DL — SIGNIFICANT CHANGE UP (ref 0.5–1.3)
EOSINOPHIL # BLD AUTO: 0 K/UL — SIGNIFICANT CHANGE UP (ref 0–0.5)
EOSINOPHIL NFR BLD AUTO: 0 % — SIGNIFICANT CHANGE UP (ref 0–6)
GAS PNL BLDV: SIGNIFICANT CHANGE UP
GLUCOSE SERPL-MCNC: 164 MG/DL — HIGH (ref 70–99)
HCT VFR BLD CALC: 32.6 % — LOW (ref 39–50)
HGB BLD-MCNC: 10.5 G/DL — LOW (ref 13–17)
LACTATE BLDV-MCNC: 2 MMOL/L — SIGNIFICANT CHANGE UP (ref 0.7–2)
LIDOCAIN IGE QN: 18 U/L — SIGNIFICANT CHANGE UP (ref 7–60)
LYMPHOCYTES # BLD AUTO: 0.6 K/UL — LOW (ref 1–3.3)
LYMPHOCYTES # BLD AUTO: 5 % — LOW (ref 13–44)
MCHC RBC-ENTMCNC: 29.4 PG — SIGNIFICANT CHANGE UP (ref 27–34)
MCHC RBC-ENTMCNC: 32.3 GM/DL — SIGNIFICANT CHANGE UP (ref 32–36)
MCV RBC AUTO: 91.1 FL — SIGNIFICANT CHANGE UP (ref 80–100)
MONOCYTES # BLD AUTO: 0.9 K/UL — SIGNIFICANT CHANGE UP (ref 0–0.9)
MONOCYTES NFR BLD AUTO: 15 % — HIGH (ref 2–14)
NEUTROPHILS # BLD AUTO: 10.2 K/UL — HIGH (ref 1.8–7.4)
NEUTROPHILS NFR BLD AUTO: 75 % — SIGNIFICANT CHANGE UP (ref 43–77)
PLATELET # BLD AUTO: 101 K/UL — LOW (ref 150–400)
POTASSIUM SERPL-MCNC: 4.4 MMOL/L — SIGNIFICANT CHANGE UP (ref 3.5–5.3)
POTASSIUM SERPL-SCNC: 4.4 MMOL/L — SIGNIFICANT CHANGE UP (ref 3.5–5.3)
PROT SERPL-MCNC: 5.4 G/DL — LOW (ref 6–8.3)
RBC # BLD: 3.58 M/UL — LOW (ref 4.2–5.8)
RBC # FLD: 18.7 % — HIGH (ref 10.3–14.5)
SODIUM SERPL-SCNC: 138 MMOL/L — SIGNIFICANT CHANGE UP (ref 135–145)
WBC # BLD: 10.9 K/UL — HIGH (ref 3.8–10.5)
WBC # FLD AUTO: 10.9 K/UL — HIGH (ref 3.8–10.5)

## 2018-09-09 PROCEDURE — 99284 EMERGENCY DEPT VISIT MOD MDM: CPT | Mod: 25

## 2018-09-09 PROCEDURE — 99284 EMERGENCY DEPT VISIT MOD MDM: CPT | Mod: GC

## 2018-09-09 PROCEDURE — 80053 COMPREHEN METABOLIC PANEL: CPT

## 2018-09-09 PROCEDURE — 82947 ASSAY GLUCOSE BLOOD QUANT: CPT

## 2018-09-09 PROCEDURE — 87040 BLOOD CULTURE FOR BACTERIA: CPT

## 2018-09-09 PROCEDURE — 85014 HEMATOCRIT: CPT

## 2018-09-09 PROCEDURE — 83605 ASSAY OF LACTIC ACID: CPT

## 2018-09-09 PROCEDURE — 74177 CT ABD & PELVIS W/CONTRAST: CPT | Mod: 26

## 2018-09-09 PROCEDURE — 84295 ASSAY OF SERUM SODIUM: CPT

## 2018-09-09 PROCEDURE — 85027 COMPLETE CBC AUTOMATED: CPT

## 2018-09-09 PROCEDURE — 82435 ASSAY OF BLOOD CHLORIDE: CPT

## 2018-09-09 PROCEDURE — 84132 ASSAY OF SERUM POTASSIUM: CPT

## 2018-09-09 PROCEDURE — 74177 CT ABD & PELVIS W/CONTRAST: CPT

## 2018-09-09 PROCEDURE — 83690 ASSAY OF LIPASE: CPT

## 2018-09-09 PROCEDURE — 82330 ASSAY OF CALCIUM: CPT

## 2018-09-09 PROCEDURE — 82803 BLOOD GASES ANY COMBINATION: CPT

## 2018-09-09 PROCEDURE — 36415 COLL VENOUS BLD VENIPUNCTURE: CPT

## 2018-09-09 RX ORDER — SODIUM CHLORIDE 9 MG/ML
1000 INJECTION INTRAMUSCULAR; INTRAVENOUS; SUBCUTANEOUS ONCE
Qty: 0 | Refills: 0 | Status: COMPLETED | OUTPATIENT
Start: 2018-09-09 | End: 2018-09-09

## 2018-09-09 RX ADMIN — SODIUM CHLORIDE 1000 MILLILITER(S): 9 INJECTION INTRAMUSCULAR; INTRAVENOUS; SUBCUTANEOUS at 17:55

## 2018-09-09 NOTE — ED PROVIDER NOTE - PHYSICAL EXAMINATION
Physical Exam:  Gen: NAD, AOx3, non-toxic appearing  Head: NCAT  HEENT: EOMI,  normal conjunctiva, oral mucosa dry  Lung: CTAB, no respiratory distress, no wheezes/rhonchi/rales B/L, speaking in full sentences  CV: RRR, no murmurs, rubs or gallops  Abd: distention with mild rigidity, nontender, no guarding, no CVA tenderness, umbilical soft tissue mass  MSK: no visible deformities  Neuro: No focal sensory or motor deficits  Skin: Warm, well perfused, no rash  Psych: normal affect  ~Beni Ortiz D.O. -Resident Physical Exam:  Gen: NAD, AOx3, non-toxic appearing  Head: NCAT  HEENT: EOMI,  normal conjunctiva, oral mucosa dry  Lung: CTAB, no respiratory distress, no wheezes/rhonchi/rales B/L, speaking in full sentences  CV: RRR, no murmurs, rubs or gallops  Abd: distention with mild rigidity, nontender, no guarding, no CVA tenderness, umbilical soft tissue mass  MSK: no visible deformities  Neuro: No focal sensory or motor deficits  Skin: Warm, well perfused, no rash  Psych: normal affect  ~Beni Ortiz D.O. -Resident       Attn - alert, nad, no pallor, no jaundice, moist mm, lungs - course BS bilat. with fine expiratory wheeze. cor - rr, no M, abdo - distended, NT, no guarding or rebound. no CVAT, + BS.  ext - no edema. skin - , neuro-

## 2018-09-09 NOTE — ED PROVIDER NOTE - SHIFT CHANGE DETAILS
patient with improved lactate from, the patient has been reassessed and there are no life threatening issues at this time. Conversation had with oncology and patient to  follow up this week with primary medical doctor and will see Onc on Tuesday.  No immediate life threatening issues present on history or clinical exam. Patient is a safe disposition home, has capacity and insight into their condition, is ambulatory in the Emergency Department with no further questions and will follow up with their doctor(s) this week. Patient  understands anticipatory guidance and was given strict return and follow up precautions. The patient has been informed of all concerning signs and symptoms to return to Emergency Department, the necessity to follow up with the PMD/Clinic/follow up provided within 2-3 days was explained, and the patient reports understanding of above with capacity and insight.
no

## 2018-09-09 NOTE — ED PROVIDER NOTE - MEDICAL DECISION MAKING DETAILS
The patient is a 68y Male PMH metastatic Colon cancer with stent on chemo, complaining of abdominal distention, patient has been tired and weak since restarting his chemotherapy. +constipation, labs, guaiac, possible ct abdomen, The patient is a 68y Male PMH metastatic Colon cancer with stent on chemo, complaining of abdominal distention, patient has been tired and weak since restarting his chemotherapy. +constipation, labs, guaiac, possible ct abdomen,      Attn - pt with colon CA c/o abdo distention.  no sympt of obstruction.  fever Thrurs likely related to injection.  Labs, CT abdo. The patient is a 68y Male PMH metastatic Colon cancer with stent on chemo, complaining of abdominal distention, patient has been tired and weak since restarting his chemotherapy. +constipation, labs,  possible ct abdomen,      Attn - pt with colon CA c/o abdo distention.  no sympt of obstruction.  fever Thrurs likely related to injection.  Labs, CT abdo.

## 2018-09-09 NOTE — ED ADULT TRIAGE NOTE - CHIEF COMPLAINT QUOTE
feels tired bloated and cough pt on chemo for colon cancer 3 weeks ago   pt receiving injections for neutropenic

## 2018-09-09 NOTE — ED PROVIDER NOTE - CARE PLAN
Principal Discharge DX:	Abdominal pain  Assessment and plan of treatment:	You were seen in the emergency department for abdominal pain and bloating. You had lab work and a CT to evaluate for any significant underlying cause. We did not find any emergent findings however it is important that you follow up with your Oncologist on Tuesday. Return to the emergency department immediately if you experience fevers, chills, shortness of breath, or any other concerning symptoms.

## 2018-09-09 NOTE — ED ADULT NURSE NOTE - NSIMPLEMENTINTERV_GEN_ALL_ED
Implemented All Universal Safety Interventions:  [place] to call system. Call bell, personal items and telephone within reach. Instruct patient to call for assistance. Room bathroom lighting operational. Non-slip footwear when patient is off stretcher. Physically safe environment: no spills, clutter or unnecessary equipment. Stretcher in lowest position, wheels locked, appropriate side rails in place.

## 2018-09-09 NOTE — ED PROVIDER NOTE - NS ED ROS FT
ROS:  GENERAL: No fever or chills  EYES: no change in vision  HEENT: no trouble swallowing or speaking  CARDIAC: no chest pain  PULMONARY: chronic cough or SOB  GI: no abdominal pain, no nausea or no vomiting, no diarrhea, + constipation, + abdominal distention  : No changes in urination  SKIN: no rashes  NEURO: no headache    ~Beni Ortiz D.O. -Resident

## 2018-09-09 NOTE — ED ADULT NURSE NOTE - OBJECTIVE STATEMENT
67 y/o male a+ox3, pmhx colon ca on chemo via port, HTN, coming from home c/o abdominal distension, feeling tired and weak s/p chemo treatment this past week. Chemo treatment began in May 2018 via port; third treatment this past week was postponed as he was neutropenic 69 y/o male a+ox3, pmhx colon ca on chemo via port, HTN, coming from home c/o abdominal distension, feeling tired and weak s/p chemo treatment this past week. Chemo treatment began in May 2018 via port; third treatment this past week was postponed as he was neutropenic with abdominal distention and general lethargy. Port access in upper right chest; site appears clean with no noted bleeding, redness or warmth. Pt denies fevers or chills, n/v, melena, hematuria, headache, dizziness, difficulty breathing, chest pain or discomfort. IV established, labs obtained. Pt left in position of comfort, will reassess

## 2018-09-09 NOTE — ED PROVIDER NOTE - PLAN OF CARE
You were seen in the emergency department for abdominal pain and bloating. You had lab work and a CT to evaluate for any significant underlying cause. We did not find any emergent findings however it is important that you follow up with your Oncologist on Tuesday. Return to the emergency department immediately if you experience fevers, chills, shortness of breath, or any other concerning symptoms.

## 2018-09-09 NOTE — ED PROVIDER NOTE - PROGRESS NOTE DETAILS
Spoke to on Call  of paty mcgee, who agreed that pt is safe to go home with good f/u   Jersey Barboza, PGY-1 Pt is feeling much better. Had a BM and his bloating symptoms have subsided.   Jersey Barboza, PGY-1 Spoke to on Call  of Stacey mcgee, who agreed that pt is safe to go home with good f/u. Believes pt likely has chronic wasting and needs to resume chemotherapy. If pleural effusions are not symptomatic can f/u outpatient   Jersey Barboza, PGY-1

## 2018-09-09 NOTE — ED PROVIDER NOTE - OBJECTIVE STATEMENT
The patient is a 68y Male PMH metastatic Colon cancer with stent on chemo, complaining of abdominal distention, patient has been tired and weak since restarting his chemotherapy. patient sees dr. matt cooley at [facility] for his colon cancer. Reports having being scheduled for his third infusion of his 2nd cycle of chemotherapy on thursday and it was postponed because of a low WBC (pt states 1.5). Patient had "injections" for Thursday, Friday and Saturday is currently awaiting an increase in WBC before being able to resume his chemo. Reports abdominal distention has been increasing over the last few weeks and now reporting decreased PO intake and mild constipation. Relief with miralax and last BM was yesterday, formed denies blood or dark stool. Reports feeling "gassy" as well. The patient is a 68y Male PMH metastatic Colon cancer with stent on chemo (FOLFOX/Avastin/Dosifuser), complaining of abdominal distention, patient has been tired and weak since restarting his chemotherapy. patient sees dr. matt cooley at [facility] for his colon cancer. Reports having being scheduled for his third infusion of his 2nd cycle of chemotherapy on Thursday and it was postponed because of a low WBC (pt states 1.5). Patient had "injections" for Thursday, Friday and Saturday is currently awaiting an increase in WBC before being able to resume his chemo. Reports abdominal distention has been increasing over the last few weeks and now reporting decreased PO intake and mild constipation. Relief with miralax and last BM was yesterday, formed denies blood or dark stool. Reports feeling "gassy" as well. The patient is a 68y Male PMH metastatic Colon cancer with stent on chemo (FOLFOX/Avastin/Dosifuser), complaining of abdominal distention, patient has been tired and weak since restarting his chemotherapy. patient sees dr. matt cooley at [facility] for his colon cancer. Reports having being scheduled for his third infusion of his 2nd cycle of chemotherapy on Thursday and it was postponed because of a low WBC (pt states 1.5). Patient had "injections" for Thursday, Friday and Saturday is currently awaiting an increase in WBC before being able to resume his chemo. Reports abdominal distention has been increasing over the last few weeks and now reporting decreased PO intake and mild constipation. Relief with miralax and last BM was yesterday, formed denies blood or dark stool. Reports feeling "gassy" as well.       Attn - pt seen in [facility] - agree with above - pt c/o abdo distention without pain for one week.  pt states intermittently becomes distended.  no pain.  + BM and flatus.  no n/v, fever.  Pt reports fever 102.4  Thursday night - rec'd injection to raise WBC Thurs am and advised may elevate temp.  no fever since. no symt of infection.  no  sympt.  + chronic cough.

## 2018-09-10 ENCOUNTER — APPOINTMENT (OUTPATIENT)
Dept: HEMATOLOGY ONCOLOGY | Facility: CLINIC | Age: 68
End: 2018-09-10

## 2018-09-10 VITALS
HEART RATE: 85 BPM | SYSTOLIC BLOOD PRESSURE: 112 MMHG | TEMPERATURE: 99 F | OXYGEN SATURATION: 99 % | RESPIRATION RATE: 19 BRPM | DIASTOLIC BLOOD PRESSURE: 70 MMHG

## 2018-09-11 ENCOUNTER — RESULT REVIEW (OUTPATIENT)
Age: 68
End: 2018-09-11

## 2018-09-11 ENCOUNTER — LABORATORY RESULT (OUTPATIENT)
Age: 68
End: 2018-09-11

## 2018-09-11 ENCOUNTER — APPOINTMENT (OUTPATIENT)
Dept: HEMATOLOGY ONCOLOGY | Facility: CLINIC | Age: 68
End: 2018-09-11
Payer: MEDICARE

## 2018-09-11 ENCOUNTER — FORM ENCOUNTER (OUTPATIENT)
Age: 68
End: 2018-09-11

## 2018-09-11 VITALS
OXYGEN SATURATION: 97 % | WEIGHT: 207.68 LBS | HEART RATE: 93 BPM | SYSTOLIC BLOOD PRESSURE: 130 MMHG | TEMPERATURE: 97.8 F | RESPIRATION RATE: 16 BRPM | DIASTOLIC BLOOD PRESSURE: 79 MMHG | BODY MASS INDEX: 30.67 KG/M2

## 2018-09-11 DIAGNOSIS — Z51.89 ENCOUNTER FOR OTHER SPECIFIED AFTERCARE: ICD-10-CM

## 2018-09-11 DIAGNOSIS — C18.9 MALIGNANT NEOPLASM OF COLON, UNSPECIFIED: ICD-10-CM

## 2018-09-11 LAB
BASOPHILS # BLD AUTO: 0 K/UL — SIGNIFICANT CHANGE UP (ref 0–0.2)
BASOPHILS NFR BLD AUTO: 0.1 % — SIGNIFICANT CHANGE UP (ref 0–2)
EOSINOPHIL # BLD AUTO: 0 K/UL — SIGNIFICANT CHANGE UP (ref 0–0.5)
EOSINOPHIL NFR BLD AUTO: 0.4 % — SIGNIFICANT CHANGE UP (ref 0–6)
HCT VFR BLD CALC: 33.2 % — LOW (ref 39–50)
HGB BLD-MCNC: 10.7 G/DL — LOW (ref 13–17)
LYMPHOCYTES # BLD AUTO: 1 K/UL — SIGNIFICANT CHANGE UP (ref 1–3.3)
LYMPHOCYTES # BLD AUTO: 9.4 % — LOW (ref 13–44)
MCHC RBC-ENTMCNC: 29.3 PG — SIGNIFICANT CHANGE UP (ref 27–34)
MCHC RBC-ENTMCNC: 32.3 G/DL — SIGNIFICANT CHANGE UP (ref 32–36)
MCV RBC AUTO: 90.7 FL — SIGNIFICANT CHANGE UP (ref 80–100)
MONOCYTES # BLD AUTO: 1.3 K/UL — HIGH (ref 0–0.9)
MONOCYTES NFR BLD AUTO: 11.9 % — SIGNIFICANT CHANGE UP (ref 2–14)
NEUTROPHILS # BLD AUTO: 8.5 K/UL — HIGH (ref 1.8–7.4)
NEUTROPHILS NFR BLD AUTO: 78.1 % — HIGH (ref 43–77)
PLATELET # BLD AUTO: 97 K/UL — LOW (ref 150–400)
RBC # BLD: 3.66 M/UL — LOW (ref 4.2–5.8)
RBC # FLD: 18.4 % — HIGH (ref 10.3–14.5)
WBC # BLD: 10.9 K/UL — HIGH (ref 3.8–10.5)
WBC # FLD AUTO: 10.9 K/UL — HIGH (ref 3.8–10.5)

## 2018-09-11 PROCEDURE — 99213 OFFICE O/P EST LOW 20 MIN: CPT

## 2018-09-12 ENCOUNTER — RESULT REVIEW (OUTPATIENT)
Age: 68
End: 2018-09-12

## 2018-09-12 ENCOUNTER — APPOINTMENT (OUTPATIENT)
Dept: ULTRASOUND IMAGING | Facility: IMAGING CENTER | Age: 68
End: 2018-09-12
Payer: MEDICARE

## 2018-09-12 ENCOUNTER — OUTPATIENT (OUTPATIENT)
Dept: OUTPATIENT SERVICES | Facility: HOSPITAL | Age: 68
LOS: 1 days | End: 2018-09-12
Payer: MEDICARE

## 2018-09-12 DIAGNOSIS — Z00.8 ENCOUNTER FOR OTHER GENERAL EXAMINATION: ICD-10-CM

## 2018-09-12 DIAGNOSIS — Z98.890 OTHER SPECIFIED POSTPROCEDURAL STATES: Chronic | ICD-10-CM

## 2018-09-12 PROCEDURE — 49083 ABD PARACENTESIS W/IMAGING: CPT

## 2018-09-12 PROCEDURE — 88305 TISSUE EXAM BY PATHOLOGIST: CPT | Mod: 26

## 2018-09-12 PROCEDURE — 88305 TISSUE EXAM BY PATHOLOGIST: CPT

## 2018-09-12 PROCEDURE — 88112 CYTOPATH CELL ENHANCE TECH: CPT | Mod: 26

## 2018-09-12 PROCEDURE — 88112 CYTOPATH CELL ENHANCE TECH: CPT

## 2018-09-13 ENCOUNTER — LABORATORY RESULT (OUTPATIENT)
Age: 68
End: 2018-09-13

## 2018-09-13 ENCOUNTER — RESULT REVIEW (OUTPATIENT)
Age: 68
End: 2018-09-13

## 2018-09-13 ENCOUNTER — APPOINTMENT (OUTPATIENT)
Dept: INFUSION THERAPY | Facility: HOSPITAL | Age: 68
End: 2018-09-13

## 2018-09-13 LAB
BASOPHILS # BLD AUTO: 0 K/UL — SIGNIFICANT CHANGE UP (ref 0–0.2)
BASOPHILS NFR BLD AUTO: 0.3 % — SIGNIFICANT CHANGE UP (ref 0–2)
EOSINOPHIL # BLD AUTO: 0 K/UL — SIGNIFICANT CHANGE UP (ref 0–0.5)
EOSINOPHIL NFR BLD AUTO: 0.4 % — SIGNIFICANT CHANGE UP (ref 0–6)
HCT VFR BLD CALC: 30.3 % — LOW (ref 39–50)
HGB BLD-MCNC: 9.9 G/DL — LOW (ref 13–17)
LYMPHOCYTES # BLD AUTO: 0.6 K/UL — LOW (ref 1–3.3)
LYMPHOCYTES # BLD AUTO: 5.8 % — LOW (ref 13–44)
MCHC RBC-ENTMCNC: 29.5 PG — SIGNIFICANT CHANGE UP (ref 27–34)
MCHC RBC-ENTMCNC: 32.7 G/DL — SIGNIFICANT CHANGE UP (ref 32–36)
MCV RBC AUTO: 90.1 FL — SIGNIFICANT CHANGE UP (ref 80–100)
MONOCYTES # BLD AUTO: 1 K/UL — HIGH (ref 0–0.9)
MONOCYTES NFR BLD AUTO: 9.1 % — SIGNIFICANT CHANGE UP (ref 2–14)
NEUTROPHILS # BLD AUTO: 9.1 K/UL — HIGH (ref 1.8–7.4)
NEUTROPHILS NFR BLD AUTO: 84.4 % — HIGH (ref 43–77)
PLATELET # BLD AUTO: 85 K/UL — LOW (ref 150–400)
RBC # BLD: 3.36 M/UL — LOW (ref 4.2–5.8)
RBC # FLD: 18.3 % — HIGH (ref 10.3–14.5)
WBC # BLD: 10.8 K/UL — HIGH (ref 3.8–10.5)
WBC # FLD AUTO: 10.8 K/UL — HIGH (ref 3.8–10.5)

## 2018-09-14 ENCOUNTER — OUTPATIENT (OUTPATIENT)
Dept: OUTPATIENT SERVICES | Facility: HOSPITAL | Age: 68
LOS: 1 days | Discharge: HOME | End: 2018-09-14

## 2018-09-14 DIAGNOSIS — C18.9 MALIGNANT NEOPLASM OF COLON, UNSPECIFIED: ICD-10-CM

## 2018-09-14 DIAGNOSIS — Z98.890 OTHER SPECIFIED POSTPROCEDURAL STATES: Chronic | ICD-10-CM

## 2018-09-14 LAB
CULTURE RESULTS: SIGNIFICANT CHANGE UP
CULTURE RESULTS: SIGNIFICANT CHANGE UP
SPECIMEN SOURCE: SIGNIFICANT CHANGE UP
SPECIMEN SOURCE: SIGNIFICANT CHANGE UP

## 2018-09-15 ENCOUNTER — APPOINTMENT (OUTPATIENT)
Dept: INFUSION THERAPY | Facility: HOSPITAL | Age: 68
End: 2018-09-15

## 2018-09-16 LAB — NON-GYNECOLOGICAL CYTOLOGY STUDY: SIGNIFICANT CHANGE UP

## 2018-09-18 ENCOUNTER — OTHER (OUTPATIENT)
Age: 68
End: 2018-09-18

## 2018-09-19 ENCOUNTER — OTHER (OUTPATIENT)
Age: 68
End: 2018-09-19

## 2018-09-19 ENCOUNTER — FORM ENCOUNTER (OUTPATIENT)
Age: 68
End: 2018-09-19

## 2018-09-20 ENCOUNTER — APPOINTMENT (OUTPATIENT)
Dept: ULTRASOUND IMAGING | Facility: IMAGING CENTER | Age: 68
End: 2018-09-20
Payer: MEDICARE

## 2018-09-20 ENCOUNTER — OUTPATIENT (OUTPATIENT)
Dept: OUTPATIENT SERVICES | Facility: HOSPITAL | Age: 68
LOS: 1 days | End: 2018-09-20
Payer: MEDICARE

## 2018-09-20 ENCOUNTER — APPOINTMENT (OUTPATIENT)
Dept: HEMATOLOGY ONCOLOGY | Facility: CLINIC | Age: 68
End: 2018-09-20
Payer: MEDICARE

## 2018-09-20 ENCOUNTER — OTHER (OUTPATIENT)
Age: 68
End: 2018-09-20

## 2018-09-20 VITALS
RESPIRATION RATE: 16 BRPM | DIASTOLIC BLOOD PRESSURE: 74 MMHG | OXYGEN SATURATION: 94 % | WEIGHT: 206.79 LBS | TEMPERATURE: 97.4 F | BODY MASS INDEX: 30.54 KG/M2 | SYSTOLIC BLOOD PRESSURE: 119 MMHG | HEART RATE: 94 BPM

## 2018-09-20 DIAGNOSIS — C18.9 MALIGNANT NEOPLASM OF COLON, UNSPECIFIED: ICD-10-CM

## 2018-09-20 DIAGNOSIS — Z98.890 OTHER SPECIFIED POSTPROCEDURAL STATES: Chronic | ICD-10-CM

## 2018-09-20 DIAGNOSIS — C78.7 SECONDARY MALIGNANT NEOPLASM OF LIVER AND INTRAHEPATIC BILE DUCT: ICD-10-CM

## 2018-09-20 DIAGNOSIS — R18.8 OTHER ASCITES: ICD-10-CM

## 2018-09-20 DIAGNOSIS — G89.3 NEOPLASM RELATED PAIN (ACUTE) (CHRONIC): ICD-10-CM

## 2018-09-20 PROCEDURE — 49083 ABD PARACENTESIS W/IMAGING: CPT

## 2018-09-20 PROCEDURE — 99215 OFFICE O/P EST HI 40 MIN: CPT

## 2018-09-20 RX ORDER — OXYCODONE 5 MG/1
5 TABLET ORAL
Qty: 60 | Refills: 0 | Status: ACTIVE | COMMUNITY
Start: 2018-09-20 | End: 1900-01-01

## 2018-09-24 ENCOUNTER — MESSAGE (OUTPATIENT)
Age: 68
End: 2018-09-24

## 2018-09-24 ENCOUNTER — INPATIENT (INPATIENT)
Facility: HOSPITAL | Age: 68
LOS: 8 days | DRG: 871 | End: 2018-10-03
Attending: INTERNAL MEDICINE | Admitting: HOSPITALIST
Payer: MEDICARE

## 2018-09-24 VITALS
OXYGEN SATURATION: 93 % | RESPIRATION RATE: 24 BRPM | TEMPERATURE: 99 F | HEART RATE: 115 BPM | DIASTOLIC BLOOD PRESSURE: 55 MMHG | WEIGHT: 199.96 LBS | HEIGHT: 70 IN | SYSTOLIC BLOOD PRESSURE: 84 MMHG

## 2018-09-24 DIAGNOSIS — Z29.9 ENCOUNTER FOR PROPHYLACTIC MEASURES, UNSPECIFIED: ICD-10-CM

## 2018-09-24 DIAGNOSIS — A41.9 SEPSIS, UNSPECIFIED ORGANISM: ICD-10-CM

## 2018-09-24 DIAGNOSIS — C18.9 MALIGNANT NEOPLASM OF COLON, UNSPECIFIED: ICD-10-CM

## 2018-09-24 DIAGNOSIS — K65.2 SPONTANEOUS BACTERIAL PERITONITIS: ICD-10-CM

## 2018-09-24 DIAGNOSIS — Z98.890 OTHER SPECIFIED POSTPROCEDURAL STATES: Chronic | ICD-10-CM

## 2018-09-24 DIAGNOSIS — D69.6 THROMBOCYTOPENIA, UNSPECIFIED: ICD-10-CM

## 2018-09-24 DIAGNOSIS — J18.9 PNEUMONIA, UNSPECIFIED ORGANISM: ICD-10-CM

## 2018-09-24 DIAGNOSIS — R74.0 NONSPECIFIC ELEVATION OF LEVELS OF TRANSAMINASE AND LACTIC ACID DEHYDROGENASE [LDH]: ICD-10-CM

## 2018-09-24 DIAGNOSIS — I10 ESSENTIAL (PRIMARY) HYPERTENSION: ICD-10-CM

## 2018-09-24 DIAGNOSIS — Z79.899 OTHER LONG TERM (CURRENT) DRUG THERAPY: ICD-10-CM

## 2018-09-24 DIAGNOSIS — N17.9 ACUTE KIDNEY FAILURE, UNSPECIFIED: ICD-10-CM

## 2018-09-24 LAB
ALBUMIN SERPL ELPH-MCNC: 2.3 G/DL — LOW (ref 3.3–5)
ALP SERPL-CCNC: 184 U/L — HIGH (ref 40–120)
ALT FLD-CCNC: 225 U/L — HIGH (ref 10–45)
ANION GAP SERPL CALC-SCNC: 18 MMOL/L — HIGH (ref 5–17)
ANISOCYTOSIS BLD QL: SIGNIFICANT CHANGE UP
APTT BLD: 52.5 SEC — HIGH (ref 27.5–37.4)
AST SERPL-CCNC: 783 U/L — HIGH (ref 10–40)
BASE EXCESS BLDV CALC-SCNC: -5.3 MMOL/L — LOW (ref -2–2)
BASE EXCESS BLDV CALC-SCNC: -5.9 MMOL/L — LOW (ref -2–2)
BASOPHILS # BLD AUTO: 0 K/UL — SIGNIFICANT CHANGE UP (ref 0–0.2)
BILIRUB SERPL-MCNC: 1.2 MG/DL — SIGNIFICANT CHANGE UP (ref 0.2–1.2)
BUN SERPL-MCNC: 52 MG/DL — HIGH (ref 7–23)
BURR CELLS BLD QL SMEAR: PRESENT — SIGNIFICANT CHANGE UP
CA-I SERPL-SCNC: 1.05 MMOL/L — LOW (ref 1.12–1.3)
CA-I SERPL-SCNC: 1.06 MMOL/L — LOW (ref 1.12–1.3)
CALCIUM SERPL-MCNC: 8.3 MG/DL — LOW (ref 8.4–10.5)
CHLORIDE BLDV-SCNC: 104 MMOL/L — SIGNIFICANT CHANGE UP (ref 96–108)
CHLORIDE BLDV-SCNC: 108 MMOL/L — SIGNIFICANT CHANGE UP (ref 96–108)
CHLORIDE SERPL-SCNC: 99 MMOL/L — SIGNIFICANT CHANGE UP (ref 96–108)
CO2 BLDV-SCNC: 19 MMOL/L — LOW (ref 22–30)
CO2 BLDV-SCNC: 19 MMOL/L — LOW (ref 22–30)
CO2 SERPL-SCNC: 18 MMOL/L — LOW (ref 22–31)
CREAT SERPL-MCNC: 1.8 MG/DL — HIGH (ref 0.5–1.3)
DACRYOCYTES BLD QL SMEAR: SLIGHT — SIGNIFICANT CHANGE UP
ELLIPTOCYTES BLD QL SMEAR: SLIGHT — SIGNIFICANT CHANGE UP
EOSINOPHIL # BLD AUTO: 0 K/UL — SIGNIFICANT CHANGE UP (ref 0–0.5)
GAS PNL BLDV: 127 MMOL/L — LOW (ref 136–145)
GAS PNL BLDV: 127 MMOL/L — LOW (ref 136–145)
GAS PNL BLDV: SIGNIFICANT CHANGE UP
GLUCOSE BLDV-MCNC: 137 MG/DL — HIGH (ref 70–99)
GLUCOSE BLDV-MCNC: 141 MG/DL — HIGH (ref 70–99)
GLUCOSE SERPL-MCNC: 153 MG/DL — HIGH (ref 70–99)
HCO3 BLDV-SCNC: 18 MMOL/L — LOW (ref 21–29)
HCO3 BLDV-SCNC: 18 MMOL/L — LOW (ref 21–29)
HCT VFR BLD CALC: 34.2 % — LOW (ref 39–50)
HCT VFR BLDA CALC: 25 % — LOW (ref 39–50)
HCT VFR BLDA CALC: 27 % — LOW (ref 39–50)
HGB BLD CALC-MCNC: 8.1 G/DL — LOW (ref 13–17)
HGB BLD CALC-MCNC: 8.6 G/DL — LOW (ref 13–17)
HGB BLD-MCNC: 11 G/DL — LOW (ref 13–17)
HOROWITZ INDEX BLDV+IHG-RTO: SIGNIFICANT CHANGE UP
HYPOCHROMIA BLD QL: SLIGHT — SIGNIFICANT CHANGE UP
INR BLD: 1.41 RATIO — HIGH (ref 0.88–1.16)
LACTATE BLDV-MCNC: 2.9 MMOL/L — HIGH (ref 0.7–2)
LACTATE BLDV-MCNC: 4.3 MMOL/L — CRITICAL HIGH (ref 0.7–2)
LYMPHOCYTES # BLD AUTO: 0.3 K/UL — LOW (ref 1–3.3)
LYMPHOCYTES # BLD AUTO: 9 % — LOW (ref 13–44)
MACROCYTES BLD QL: SLIGHT — SIGNIFICANT CHANGE UP
MCHC RBC-ENTMCNC: 29.1 PG — SIGNIFICANT CHANGE UP (ref 27–34)
MCHC RBC-ENTMCNC: 32.2 GM/DL — SIGNIFICANT CHANGE UP (ref 32–36)
MCV RBC AUTO: 90.2 FL — SIGNIFICANT CHANGE UP (ref 80–100)
MONOCYTES # BLD AUTO: 0.6 K/UL — SIGNIFICANT CHANGE UP (ref 0–0.9)
MONOCYTES NFR BLD AUTO: 8 % — SIGNIFICANT CHANGE UP (ref 2–14)
NEUTROPHILS # BLD AUTO: 4.1 K/UL — SIGNIFICANT CHANGE UP (ref 1.8–7.4)
NEUTROPHILS NFR BLD AUTO: 79 % — HIGH (ref 43–77)
NEUTS BAND # BLD: 4 % — SIGNIFICANT CHANGE UP (ref 0–8)
OTHER CELLS CSF MANUAL: 11 ML/DL — LOW (ref 18–22)
PCO2 BLDV: 30 MMHG — LOW (ref 35–50)
PCO2 BLDV: 34 MMHG — LOW (ref 35–50)
PH BLDV: 7.36 — SIGNIFICANT CHANGE UP (ref 7.35–7.45)
PH BLDV: 7.4 — SIGNIFICANT CHANGE UP (ref 7.35–7.45)
PLAT MORPH BLD: NORMAL — SIGNIFICANT CHANGE UP
PLATELET # BLD AUTO: 80 K/UL — LOW (ref 150–400)
PO2 BLDV: 69 MMHG — HIGH (ref 25–45)
PO2 BLDV: 77 MMHG — HIGH (ref 25–45)
POIKILOCYTOSIS BLD QL AUTO: SIGNIFICANT CHANGE UP
POLYCHROMASIA BLD QL SMEAR: SLIGHT — SIGNIFICANT CHANGE UP
POTASSIUM BLDV-SCNC: 4.6 MMOL/L — SIGNIFICANT CHANGE UP (ref 3.5–5.3)
POTASSIUM BLDV-SCNC: 4.8 MMOL/L — SIGNIFICANT CHANGE UP (ref 3.5–5.3)
POTASSIUM SERPL-MCNC: 5.5 MMOL/L — HIGH (ref 3.5–5.3)
POTASSIUM SERPL-SCNC: 5.5 MMOL/L — HIGH (ref 3.5–5.3)
PROT SERPL-MCNC: 5.1 G/DL — LOW (ref 6–8.3)
PROTHROM AB SERPL-ACNC: 15.5 SEC — HIGH (ref 9.8–12.7)
RAPID RVP RESULT: SIGNIFICANT CHANGE UP
RBC # BLD: 3.79 M/UL — LOW (ref 4.2–5.8)
RBC # FLD: 20.1 % — HIGH (ref 10.3–14.5)
RBC BLD AUTO: ABNORMAL
SAO2 % BLDV: 92 % — HIGH (ref 67–88)
SAO2 % BLDV: 94 % — HIGH (ref 67–88)
SCHISTOCYTES BLD QL AUTO: SLIGHT — SIGNIFICANT CHANGE UP
SODIUM SERPL-SCNC: 135 MMOL/L — SIGNIFICANT CHANGE UP (ref 135–145)
WBC # BLD: 5.1 K/UL — SIGNIFICANT CHANGE UP (ref 3.8–10.5)
WBC # FLD AUTO: 5.1 K/UL — SIGNIFICANT CHANGE UP (ref 3.8–10.5)

## 2018-09-24 PROCEDURE — 93010 ELECTROCARDIOGRAM REPORT: CPT | Mod: GC

## 2018-09-24 PROCEDURE — 99223 1ST HOSP IP/OBS HIGH 75: CPT | Mod: GC

## 2018-09-24 PROCEDURE — 71045 X-RAY EXAM CHEST 1 VIEW: CPT | Mod: 26

## 2018-09-24 PROCEDURE — 99223 1ST HOSP IP/OBS HIGH 75: CPT | Mod: GC,AI

## 2018-09-24 PROCEDURE — 99291 CRITICAL CARE FIRST HOUR: CPT | Mod: GC

## 2018-09-24 RX ORDER — ACETAMINOPHEN 500 MG
650 TABLET ORAL EVERY 6 HOURS
Qty: 0 | Refills: 0 | Status: DISCONTINUED | OUTPATIENT
Start: 2018-09-24 | End: 2018-10-02

## 2018-09-24 RX ORDER — SODIUM CHLORIDE 9 MG/ML
250 INJECTION INTRAMUSCULAR; INTRAVENOUS; SUBCUTANEOUS ONCE
Qty: 0 | Refills: 0 | Status: COMPLETED | OUTPATIENT
Start: 2018-09-24 | End: 2018-09-24

## 2018-09-24 RX ORDER — LORATADINE 10 MG/1
10 TABLET ORAL DAILY
Qty: 0 | Refills: 0 | Status: DISCONTINUED | OUTPATIENT
Start: 2018-09-24 | End: 2018-10-02

## 2018-09-24 RX ORDER — SIMETHICONE 80 MG/1
80 TABLET, CHEWABLE ORAL
Qty: 0 | Refills: 0 | Status: DISCONTINUED | OUTPATIENT
Start: 2018-09-24 | End: 2018-09-27

## 2018-09-24 RX ORDER — PIPERACILLIN AND TAZOBACTAM 4; .5 G/20ML; G/20ML
3.38 INJECTION, POWDER, LYOPHILIZED, FOR SOLUTION INTRAVENOUS ONCE
Qty: 0 | Refills: 0 | Status: COMPLETED | OUTPATIENT
Start: 2018-09-24 | End: 2018-09-24

## 2018-09-24 RX ORDER — INFLUENZA VIRUS VACCINE 15; 15; 15; 15 UG/.5ML; UG/.5ML; UG/.5ML; UG/.5ML
0.5 SUSPENSION INTRAMUSCULAR ONCE
Qty: 0 | Refills: 0 | Status: DISCONTINUED | OUTPATIENT
Start: 2018-09-24 | End: 2018-10-02

## 2018-09-24 RX ORDER — VANCOMYCIN HCL 1 G
1000 VIAL (EA) INTRAVENOUS EVERY 24 HOURS
Qty: 0 | Refills: 0 | Status: DISCONTINUED | OUTPATIENT
Start: 2018-09-24 | End: 2018-09-25

## 2018-09-24 RX ORDER — VANCOMYCIN HCL 1 G
1000 VIAL (EA) INTRAVENOUS EVERY 12 HOURS
Qty: 0 | Refills: 0 | Status: DISCONTINUED | OUTPATIENT
Start: 2018-09-24 | End: 2018-09-24

## 2018-09-24 RX ORDER — SODIUM CHLORIDE 9 MG/ML
2000 INJECTION INTRAMUSCULAR; INTRAVENOUS; SUBCUTANEOUS ONCE
Qty: 0 | Refills: 0 | Status: COMPLETED | OUTPATIENT
Start: 2018-09-24 | End: 2018-09-24

## 2018-09-24 RX ORDER — PIPERACILLIN AND TAZOBACTAM 4; .5 G/20ML; G/20ML
3.38 INJECTION, POWDER, LYOPHILIZED, FOR SOLUTION INTRAVENOUS EVERY 12 HOURS
Qty: 0 | Refills: 0 | Status: DISCONTINUED | OUTPATIENT
Start: 2018-09-24 | End: 2018-09-25

## 2018-09-24 RX ORDER — DIPHENHYDRAMINE HYDROCHLORIDE AND LIDOCAINE HYDROCHLORIDE AND ALUMINUM HYDROXIDE AND MAGNESIUM HYDRO
15 KIT EVERY 6 HOURS
Qty: 0 | Refills: 0 | Status: DISCONTINUED | OUTPATIENT
Start: 2018-09-24 | End: 2018-10-02

## 2018-09-24 RX ORDER — VANCOMYCIN HCL 1 G
1000 VIAL (EA) INTRAVENOUS ONCE
Qty: 0 | Refills: 0 | Status: COMPLETED | OUTPATIENT
Start: 2018-09-24 | End: 2018-09-24

## 2018-09-24 RX ORDER — IBUPROFEN 200 MG
600 TABLET ORAL ONCE
Qty: 0 | Refills: 0 | Status: COMPLETED | OUTPATIENT
Start: 2018-09-24 | End: 2018-09-24

## 2018-09-24 RX ORDER — CEFEPIME 1 G/1
2000 INJECTION, POWDER, FOR SOLUTION INTRAMUSCULAR; INTRAVENOUS ONCE
Qty: 0 | Refills: 0 | Status: COMPLETED | OUTPATIENT
Start: 2018-09-24 | End: 2018-09-24

## 2018-09-24 RX ORDER — SODIUM CHLORIDE 9 MG/ML
1000 INJECTION INTRAMUSCULAR; INTRAVENOUS; SUBCUTANEOUS
Qty: 0 | Refills: 0 | Status: DISCONTINUED | OUTPATIENT
Start: 2018-09-24 | End: 2018-09-30

## 2018-09-24 RX ORDER — SODIUM CHLORIDE 9 MG/ML
2700 INJECTION INTRAMUSCULAR; INTRAVENOUS; SUBCUTANEOUS ONCE
Qty: 0 | Refills: 0 | Status: COMPLETED | OUTPATIENT
Start: 2018-09-24 | End: 2018-09-24

## 2018-09-24 RX ADMIN — SODIUM CHLORIDE 50 MILLILITER(S): 9 INJECTION INTRAMUSCULAR; INTRAVENOUS; SUBCUTANEOUS at 23:48

## 2018-09-24 RX ADMIN — LORATADINE 10 MILLIGRAM(S): 10 TABLET ORAL at 22:21

## 2018-09-24 RX ADMIN — Medication 250 MILLIGRAM(S): at 13:30

## 2018-09-24 RX ADMIN — SODIUM CHLORIDE 2000 MILLILITER(S): 9 INJECTION INTRAMUSCULAR; INTRAVENOUS; SUBCUTANEOUS at 14:13

## 2018-09-24 RX ADMIN — PIPERACILLIN AND TAZOBACTAM 200 GRAM(S): 4; .5 INJECTION, POWDER, LYOPHILIZED, FOR SOLUTION INTRAVENOUS at 22:15

## 2018-09-24 RX ADMIN — CEFEPIME 100 MILLIGRAM(S): 1 INJECTION, POWDER, FOR SOLUTION INTRAMUSCULAR; INTRAVENOUS at 12:45

## 2018-09-24 RX ADMIN — Medication 600 MILLIGRAM(S): at 12:35

## 2018-09-24 RX ADMIN — SODIUM CHLORIDE 250 MILLILITER(S): 9 INJECTION INTRAMUSCULAR; INTRAVENOUS; SUBCUTANEOUS at 21:40

## 2018-09-24 RX ADMIN — SODIUM CHLORIDE 2700 MILLILITER(S): 9 INJECTION INTRAMUSCULAR; INTRAVENOUS; SUBCUTANEOUS at 12:30

## 2018-09-24 NOTE — H&P ADULT - PROBLEM SELECTOR PLAN 4
likely in setting of dehydration and shock  -continue with fluids  -check Cr daily  -avoid nephrotoxic agents

## 2018-09-24 NOTE — H&P ADULT - PROBLEM SELECTOR PLAN 6
Appears to be chronic w/ chronic schistocytosis.   -will monitor closely, if does not improve with sepsis treatment, will need to send out DIC labs  -SCDs for VTE until thrombocytopenia improves

## 2018-09-24 NOTE — H&P ADULT - PROBLEM SELECTOR PLAN 5
Shock liver vs mets to liver vs Bud Chiari   -f/u CT non con (may need CT with contrast, however, will hold off in setting of ROSEMARY on CKD)  -will order a RUQ liver u/s w/ doppler Shock liver vs mets to liver vs Bud Chiari   -f/u CT non con (may need CT with contrast, however, will hold off in setting of ROSEMARY   -will order a RUQ liver u/s w/ doppler

## 2018-09-24 NOTE — H&P ADULT - PROBLEM SELECTOR PLAN 3
tp w/ chronic ascites 2/2 malignancy. was therapeutically tapped on 9/20  -will need paracentesis in the AM  -c/w abx as above

## 2018-09-24 NOTE — ED PROVIDER NOTE - PHYSICAL EXAMINATION
Gen: NAD  Head: NCAT  Lung: CTAB, no respiratory distress, no wheezing, rales, rhonchi  CV: normal s1/s2, rrr, no murmurs, Normal perfusion  Abd: soft, NTND  MSK: No edema, no visible deformities, full range of motion in all 4 extremities  Neuro: No focal neurologic deficits  Skin: No rash   Psych: normal affect

## 2018-09-24 NOTE — H&P ADULT - PROBLEM SELECTOR PLAN 1
tachypneic, febrile, tachycardic, possible PNA as source.  - tachypneic, febrile, tachycardic, possible PNA vs SBP as source.  -s/p Vanc/Cefepime and 4.7LNS  -will switch to Vanc/Zosyn for anaerobic coverage  -CXR showing atelectasis bilaterally  -lactate has been clearing with fluids, will continue 50cc/hr overnight  -will order a CT chest, abd, pelvis  -f/u blood and urine clxs

## 2018-09-24 NOTE — ED PROVIDER NOTE - MEDICAL DECISION MAKING DETAILS
69yo male with fever, shortness of breath, on chemotherapy, concerning for sepsis, will give fluids, broad-spectrum antibiotics, check labs, ua, cxr, reassess. Shamika Verduzco DO

## 2018-09-24 NOTE — CONSULT NOTE ADULT - SUBJECTIVE AND OBJECTIVE BOX
ONCOLOGY CONSULT NOTE     HPI: Patibaljinder beasley a 68      Allergies    sulfa drugs (Unknown)    Intolerances        MEDICATIONS  (STANDING):    MEDICATIONS  (PRN):      PAST MEDICAL & SURGICAL HISTORY:  Malignant neoplasm of colon, unspecified part of colon  HTN (hypertension)  History of tonsillectomy      FAMILY HISTORY:  Family history of breast cancer in mother      SOCIAL HISTORY: No EtOH, no tobacco    REVIEW OF SYSTEMS:    CONSTITUTIONAL: No weakness, fevers or chills  EYES/ENT: No visual changes;  No vertigo or throat pain   NECK: No pain or stiffness  RESPIRATORY: No cough, wheezing, hemoptysis; No shortness of breath  CARDIOVASCULAR: No chest pain or palpitations  GASTROINTESTINAL: No abdominal or epigastric pain. No nausea, vomiting, or hematemesis; No diarrhea or constipation. No melena or hematochezia.  GENITOURINARY: No dysuria, frequency or hematuria  NEUROLOGICAL: No numbness or weakness  SKIN: No itching, burning, rashes, or lesions   All other review of systems is negative unless indicated above.    Height (cm): 177.8 (09-24 @ 11:57)  Weight (kg): 90.7 (09-24 @ 11:57)  BMI (kg/m2): 28.7 (09-24 @ 11:57)  BSA (m2): 2.09 (09-24 @ 11:57)    T(F): 98.8 (09-24-18 @ 16:22), Max: 99.5 (09-24-18 @ 12:12)  HR: 73 (09-24-18 @ 16:22)  BP: 94/60 (09-24-18 @ 16:22)  RR: 24 (09-24-18 @ 16:22)  SpO2: 98% (09-24-18 @ 16:22)  Wt(kg): --    GENERAL: NAD, well-developed  HEAD:  Atraumatic, Normocephalic  EYES: EOMI, PERRLA, conjunctiva and sclera clear  NECK: Supple, No JVD  CHEST/LUNG: Clear to auscultation bilaterally; No wheeze  HEART: Regular rate and rhythm; No murmurs, rubs, or gallops  ABDOMEN: Soft, Nontender, Nondistended; Bowel sounds present  EXTREMITIES:  2+ Peripheral Pulses, No clubbing, cyanosis, or edema  NEUROLOGY: non-focal  SKIN: No rashes or lesions                          11.0   5.1   )-----------( 80       ( 24 Sep 2018 12:35 )             34.2       09-24    135  |  99  |  52<H>  ----------------------------<  153<H>  5.5<H>   |  18<L>  |  1.80<H>    Ca    8.3<L>      24 Sep 2018 12:35    TPro  5.1<L>  /  Alb  2.3<L>  /  TBili  1.2  /  DBili  x   /  AST  783<H>  /  ALT  225<H>  /  AlkPhos  184<H>  09-24 ONCOLOGY CONSULT NOTE     HPI: Patient is a 67 y/o M with a PMH of metastatic colon cancer (with known mets to the liver, peritoneum, and lung) s/p 6 cycles of FOLFOX and [id number] on week 2 of vectibix and irinotecan who presents today with fever and fatigue. The patient and his wife state that since starting his new chemotherapy regimen earlier this month he has felt more fatigued than usual. He has had abdominal distension throughout his diagnosis (April 2018) but has recently undergone paracentesis x 2 for comfort on 9/12 and 9/20. On Friday he was outside for a work event and afterwards felt very fatigued. He spent the weekend resting on the couch and this morning he felt more weak so his wife took his temperature which was 103.1. They called [facility] and came to the ED. The patient notes increased fatigue and SOB. He denies fevers, chills, cough, abdominal pain, nausea, or vomiting. Denies dysuria or recent travel. He has not been eating much int he last few weeks. He has been having soft bowel movements but no diarrhea.       Allergies    sulfa drugs (Unknown)    Intolerances        MEDICATIONS  (STANDING):    MEDICATIONS  (PRN):      PAST MEDICAL & SURGICAL HISTORY:  Malignant neoplasm of colon, unspecified part of colon  HTN (hypertension)  History of tonsillectomy      FAMILY HISTORY:  Family history of breast cancer in mother      SOCIAL HISTORY: Remote smoking history but quit many years ago. Occasional social alcohol use. No recreational drugs. Currently still working as an .     REVIEW OF SYSTEMS:    CONSTITUTIONAL: + weakness, no fevers or chills  EYES/ENT: No visual changes;  No vertigo or throat pain   NECK: No pain or stiffness  RESPIRATORY: No cough, wheezing, hemoptysis; + SOB  CARDIOVASCULAR: No chest pain or palpitations  GASTROINTESTINAL: No abdominal or epigastric pain. No nausea, vomiting, or hematemesis; No diarrhea or constipation. No melena or hematochezia.  GENITOURINARY: No dysuria, frequency or hematuria  NEUROLOGICAL: + numbness and tingling in his lower extremities, stable   SKIN: No itching, burning, rashes, or lesions   All other review of systems is negative unless indicated above.    Height (cm): 177.8 (09-24 @ 11:57)  Weight (kg): 90.7 (09-24 @ 11:57)  BMI (kg/m2): 28.7 (09-24 @ 11:57)  BSA (m2): 2.09 (09-24 @ 11:57)    T(F): 98.8 (09-24-18 @ 16:22), Max: 99.5 (09-24-18 @ 12:12)  HR: 73 (09-24-18 @ 16:22)  BP: 94/60 (09-24-18 @ 16:22)  RR: 24 (09-24-18 @ 16:22)  SpO2: 98% (09-24-18 @ 16:22)  Wt(kg): --    GENERAL: NAD, NC in place  HEAD:  Atraumatic, Normocephalic  EYES: EOMI, PERRLA, conjunctiva and sclera clear  NECK: Supple, No JVD  CHEST/LUNG: diminished breath sounds at the bases bilaterally, lungs otherwise clear to auscultation, no crackles   HEART: tachycardic, regular rhythm   ABDOMEN: + BS, abdomen distended but soft, non tender to palpation   EXTREMITIES: 1+ edema in LE bilaterally   NEUROLOGY: no focal deficits noted   SKIN: No rashes or lesions                          11.0   5.1   )-----------( 80       ( 24 Sep 2018 12:35 )             34.2       09-24    135  |  99  |  52<H>  ----------------------------<  153<H>  5.5<H>   |  18<L>  |  1.80<H>    Ca    8.3<L>      24 Sep 2018 12:35    TPro  5.1<L>  /  Alb  2.3<L>  /  TBili  1.2  /  DBili  x   /  AST  783<H>  /  ALT  225<H>  /  AlkPhos  184<H>  09-24      Rapid Respiratory Viral Panel (09.24.18 @ 13:35)    Rapid RVP Result: NotDetec: The [employer] RVP Rapid uses polymerase chain reaction (PCR) and melt  curve analysis to screen for adenovirus; coronavirus HKU1, NL63, 229E,  OC43; human metapneumovirus (hMPV); human enterovirus/rhinovirus  (Entero/RV); influenza A; influenza A/H1;influenza A/H3; influenza  A/H1-2009; influenza B; parainfluenza viruses 1, 2, 3, 4; respiratory  syncytial virus; Bordetella pertussis; Mycoplasma pneumoniae; and  Chlamydophila pneumoniae.        RADIOLOGY    < from: Xray Chest 1 View- PORTABLE-Urgent (09.24.18 @ 13:43) >    INTERPRETATION:  A single chest x-ray was obtained on September 24, 2018.    Indication: Sepsis.    Impression:    The heart is normal in size. Bibasilar platelike atelectasis. A MediPort   is seen on the right and the tip is in superior vena cava. No   pneumothorax.    < end of copied text >

## 2018-09-24 NOTE — H&P ADULT - ATTENDING COMMENTS
Pt admitted with Severe Sepsis with Acute organ disfuntion, ROSEMARY, Acute Liver Injury. The presumed source of sepsis are Pneumonia vs Acute peritoneal infection.    Plan  - broad spec abx with Vanco/ Zosyn (renally dosed)  - will need a diagnostic paracentesis in the AM  - CT scan Chest/Abd/Pelvis non-contrast study  - c/w IV maintanece fluids  - Pt has not voided after ~5l of crystaloids, bladder scan showing >600ml pt will try to void but if not successfull or if post void residual is >300ml then will need a straight catheterization.   - will need a RUQ u/s with dopplers  - renally dose medications and monitor Cr.

## 2018-09-24 NOTE — ED PROVIDER NOTE - PROGRESS NOTE DETAILS
HR improving with IVF, SBP stable (high 80s - low 90s).  Patient clinically well, no respiratory distress. MD Mccarthy:  patient clearing his lactate.  Cap. Refill:   <2 sec     >2 sec.  Pulses:    Full     Skin:     Normal.   Lungs:    Clear.  Heart:   Regular   I have re-evaluated the patient's fluid status and reviewed the vital signs.  Clinical evaluation demonstrates an appropriate response to fluid resuscitation.

## 2018-09-24 NOTE — H&P ADULT - NSHPPHYSICALEXAM_GEN_ALL_CORE
T(C): 37.1 (09-24-18 @ 16:22), Max: 37.5 (09-24-18 @ 12:12)  HR: 73 (09-24-18 @ 16:22) (73 - 115)  BP: 94/60 (09-24-18 @ 16:22) (79/62 - 115/68)  RR: 24 (09-24-18 @ 16:22) (24 - 26)  SpO2: 98% (09-24-18 @ 16:22) (90% - 98%)    GENERAL: NAD, well-developed  HEAD:  Atraumatic, Normocephalic  EYES: EOMI, PERRLA, conjunctiva and sclera clear  NECK: Supple, No JVD  CHEST/LUNG: reduced breath sounds bilateral bases, dull to percussion on bilateral bases  HEART: Regular rate and rhythm; No murmurs, rubs, or gallops  ABDOMEN: distended, globus, positive fluid wave  EXTREMITIES:  3+ pitting edema to the knees. pulses palpable bilaterally  PSYCH: AAOx3  NEUROLOGY: non-focal  SKIN: No rashes or lesions

## 2018-09-24 NOTE — ED PROVIDER NOTE - ATTENDING CONTRIBUTION TO CARE
MD Mccarthy:  patient seen and evaluated with the resident.  I was present for key portions of the History & Physical, and I agree with the Impression & Plan.  MD Mccarthy:  67 yo M, c/o fever on chemo.  Tm 103 at home.  last chemo 10d ago.  Context: stage 4 colon CA w/lung and liver mets.  +Cough w/SOB x 1mos.  Primary Oncologist = Dr. Aguilera; Onc team has already been consulted.  Associated Sx:  cough, no abdominal pain.  Intensity: 7/10.  Better:  none.  Worse:  seems to be getting worse over time.   VS:  +Tachy, +hypotensive.  Physical Exam: adult M, chronically ill-appearing, neck supple, lungs: diminished breath sounds, abd: s/+distended/nontender.    Impression:  sepsis on chemo.  Plan:  resuscitate, culture, empiric abx, frequent reassessments.

## 2018-09-24 NOTE — CHART NOTE - NSCHARTNOTEFT_GEN_A_CORE
EVENT NOTE: Hypotension    Pt is a 68M pmh metastatic colon ca (currently on chemo, last was 2 weeks ago), HTN admitted for sepsis, PNA vs SBP. I was notified by night float intern of this event after they were contacted; nursing now has my direct number. Patient was ordered for 250 cc bolus. Earlier today he received a total of 4.7 L on admission and was started on standing 50 cc/hr NS. I saw and examined the patient. He is breathing comfortably with NC in place not requiring higher O2 requirements, speaking in full sentences. He has 1+ pitting edema in bilateral LE's present even before admission per wife and patient. He should not receive additional boluses overnight as already adequately fluid resuscitated. BP with me at bedside is 97/59 mmHg, similar in both arms. MAP>65 at this time. Will continue with vitals q4h and monitor closely. If persistent hypotension, will consult MICU for possible pressor support. Patient is afebrile and appears to be responding to current antibiotic regimen.  -vitals q4h  -MICU consult if persistent hypotension, though at this time responding appropriately to fluids    [facility], PGY-3  Pager [phone] / [phone] / [place] [phone]

## 2018-09-24 NOTE — ED PROVIDER NOTE - OBJECTIVE STATEMENT
69yo male PMH HTN, colon cancer on chemo (last chemo 2 weeks ago), presenting with fatigue, poor appetite, fever, cough, and shortness of breath x several days. Patient noted to have fever tmax 103F this am, received Tylenol at 9:30am. Patient recently had therapeutic paracentesis ~ 6 days ago, denies abdominal pain or weight gain since that time. No vomiting, no chest pain.     Onc: Dr. Casey

## 2018-09-24 NOTE — ED ADULT TRIAGE NOTE - CHIEF COMPLAINT QUOTE
Pt has hx metastatic colon CA, currently on chemo, last chemo Thursday 9/13  Pt reports malaise, fevers, chills, fatigue, diarrhea, abdominal pain since last week  Tmax 103.1F at home this morning, took Tylenol at 0900, sent in by Oncologist Dakota Casey  Pt c/o SOB

## 2018-09-24 NOTE — CONSULT NOTE ADULT - ASSESSMENT
Patient is a 67 y/o M with a PMH significant for metastatic colon cancer on chemotherapy who presents with sepsis likely secondary to an intra-abdominal infection.

## 2018-09-24 NOTE — ED ADULT NURSE NOTE - NSIMPLEMENTINTERV_GEN_ALL_ED
Implemented All Universal Safety Interventions:  [place] to call system. Call bell, personal items and telephone within reach. Instruct patient to call for assistance. Room bathroom lighting operational. Non-slip footwear when patient is off stretcher. Physically safe environment: no spills, clutter or unnecessary equipment. Stretcher in lowest position, wheels locked, appropriate side rails in place.

## 2018-09-24 NOTE — H&P ADULT - NSHPLABSRESULTS_GEN_ALL_CORE
11.0   5.1   )-----------( 80       ( 24 Sep 2018 12:35 )             34.2       09-24    135  |  99  |  52<H>  ----------------------------<  153<H>  5.5<H>   |  18<L>  |  1.80<H>    Ca    8.3<L>      24 Sep 2018 12:35    TPro  5.1<L>  /  Alb  2.3<L>  /  TBili  1.2  /  DBili  x   /  AST  783<H>  /  ALT  225<H>  /  AlkPhos  184<H>  09-24          PT/INR - ( 24 Sep 2018 12:35 )   PT: 15.5 sec;   INR: 1.41 ratio         PTT - ( 24 Sep 2018 12:35 )  PTT:52.5 sec    Lactate Trend   Lactate 6.6->4.3->2.9    CAPILLARY BLOOD GLUCOSE  POCT Blood Glucose.: 141 mg/dL (24 Sep 2018 12:23)    < from: Xray Chest 1 View- PORTABLE-Urgent (09.24.18 @ 13:43) >    Impression:    The heart is normal in size. Bibasilar platelike atelectasis. A MediPort   is seen on the right and the tip is in superior vena cava. No   pneumothorax.    < end of copied text > labs and imaging personally reviewed.    11.0   5.1   )-----------( 80       ( 24 Sep 2018 12:35 )             34.2       09-24    135  |  99  |  52<H>  ----------------------------<  153<H>  5.5<H>   |  18<L>  |  1.80<H>    Ca    8.3<L>      24 Sep 2018 12:35    TPro  5.1<L>  /  Alb  2.3<L>  /  TBili  1.2  /  DBili  x   /  AST  783<H>  /  ALT  225<H>  /  AlkPhos  184<H>  09-24          PT/INR - ( 24 Sep 2018 12:35 )   PT: 15.5 sec;   INR: 1.41 ratio         PTT - ( 24 Sep 2018 12:35 )  PTT:52.5 sec    Lactate Trend   Lactate 6.6->4.3->2.9    CAPILLARY BLOOD GLUCOSE  POCT Blood Glucose.: 141 mg/dL (24 Sep 2018 12:23)    < from: Xray Chest 1 View- PORTABLE-Urgent (09.24.18 @ 13:43) >    Impression:    The heart is normal in size. Bibasilar platelike atelectasis. A MediPort   is seen on the right and the tip is in superior vena cava. No   pneumothorax.    < end of copied text >

## 2018-09-24 NOTE — H&P ADULT - PROBLEM SELECTOR PLAN 9
Neither pt nor wife are clear about medications pt is taking. Medication list that was reported does not match medication list in system, and does not match medications sent to pharmacy.  -ask wife to bring in medications tomorrow to verify which he is taking

## 2018-09-24 NOTE — H&P ADULT - NSHPREVIEWOFSYSTEMS_GEN_ALL_CORE
CONSTITUTIONAL: as per HPI  EYES/ENT: No visual changes;  No vertigo or throat pain   NECK: No pain or stiffness  RESPIRATORY: as per HPI  CARDIOVASCULAR: No chest pain or palpitations  GASTROINTESTINAL: No abdominal or epigastric pain. No n/v/c/d, melena, or hematochezia.  GENITOURINARY: No dysuria, frequency or hematuria  NEUROLOGICAL:  No headache, dizziness, syncope.  MUSCULOSKELETAL:  No muscle, back pain, joint pain or stiffness.  HEMATOLOGIC:  No anemia, bleeding or bruising.  LYMPHATICS:  No enlarged nodes. No history of splenectomy.  PSYCHIATRIC:  No history of depression or anxiety.  ENDOCRINOLOGIC:  No reports of sweating, cold or heat intolerance. No polyuria or polydipsia.  ALLERGIES:  No history of asthma, hives, eczema or rhinitis.

## 2018-09-24 NOTE — H&P ADULT - PROBLEM SELECTOR PLAN 10
VTE: SCDs in setting of thrombocytopenia  Diet: regular (switch to DASH once BP improves)    KIT Milian MD-PGY2  Internal Medicine Department  Pager: [phone] / [phone]

## 2018-09-24 NOTE — H&P ADULT - ASSESSMENT
68M pmh metastatic colo ca (currently on chemo, last was 2 weeks ago), HTN admitted for sepsis, unclear source. 68M pmh metastatic colon ca (currently on chemo, last was 2 weeks ago), HTN admitted for sepsis, PNA vs SBP.

## 2018-09-24 NOTE — ED ADULT NURSE NOTE - OBJECTIVE STATEMENT
68 year old male presents to ED via wheel chair through waiting room from home, sent in by Oncologist Dakota Casey, complaining of fever at home, tmax 103, took tylenol this morning 0900, on chemo through [facility], most recently two weeks ago. 68 year old male presents to ED via wheel chair through waiting room from home, sent in by Oncologist Dakota Casey, complaining of fever at home, tmax 103, took tylenol this morning 0900, on chemo through [facility], most recently two weeks ago. History of colon cancer mets to liver, ascites s/p multiple paracentesis, HTN.  No presenting with fevers from home, fatigue, cough & shortness of breath x 7 days. Denies changes in weight since last paracentesis 6 days ago, HA, CP, abdominal pain or discomfort, nausea, vomiting, diarrhea. Code sepsis called. Two 18g PIV inserted bilateral ACs. Port in right upper chest wall accessed and 3rd set of cultures drawn as ordered. Patient undressed and placed into gown, call bell in hand and side rails up with bed in lowest position for safety. blanket provided. Comfort and safety provided.

## 2018-09-24 NOTE — H&P ADULT - HISTORY OF PRESENT ILLNESS
68M pmh metastatic colo ca (currently on chemo, last was 2 weeks ago), HTN now presenting to the ED for weakness, worsening cough, fevers to 103 at home. Pt reports that starting Friday, he has been feeling weak and ill. His wife reports that he has been having subjective fevers, which have been getting worse. Pt took his temp on morning of admission which was found to be 103F. Pt's wife gave him 2 tylenol and brought him to the ED. Pt reports that he had a paracentesis performed on 9/20 for therapeutic purposes. Pt reports that his weight has not increased form the time of the therapeutic tap, however, he does feel more bloated. Pt describes that he has been having a chronic cough for over a year, which he reports is 2/2 postnasal drip, for which he tales Claritin. Pt denies night sweats, hemoptysis, n/v/d/c, abd pain, chest pain, pain on urination.     In the ED:   Vitals: 99.5, 115->73, 94/60, rr24 97% on 3L  Notable Labs and Imaging: CXR bibasilar platelike atelectasis; Lactate 6.6->4.3->2.9;   Given: 4.5L NS, Cefepime, Vanc 68M pmh metastatic colo ca (currently on chemo, last was 2 weeks ago), HTN, chronic ascitics now presenting to the ED for weakness, worsening cough, fevers to 103 at home. Pt reports that starting Friday, he has been feeling weak and ill. His wife reports that he has been having subjective fevers, which have been getting worse. Pt took his temp on morning of admission which was found to be 103F. Pt's wife gave him 2 tylenol and brought him to the ED. Pt reports that he had a paracentesis performed on 9/20 for therapeutic purposes. Pt reports that his weight has not increased form the time of the therapeutic tap, however, he does feel more bloated. Pt describes that he has been having a chronic cough for over a year, which he reports is 2/2 postnasal drip, for which he tales Claritin. Pt denies night sweats, hemoptysis, n/v/d/c, abd pain, chest pain, pain on urination.     In the ED:   Vitals: 99.5, 115->73, 94/60, rr24 97% on 3L  Notable Labs and Imaging: CXR bibasilar platelike atelectasis; Lactate 6.6->4.3->2.9;   Given: 4.7L NS, Cefepime, Vanc

## 2018-09-25 ENCOUNTER — RESULT REVIEW (OUTPATIENT)
Age: 68
End: 2018-09-25

## 2018-09-25 ENCOUNTER — APPOINTMENT (OUTPATIENT)
Dept: HEMATOLOGY ONCOLOGY | Facility: CLINIC | Age: 68
End: 2018-09-25

## 2018-09-25 DIAGNOSIS — I81 PORTAL VEIN THROMBOSIS: ICD-10-CM

## 2018-09-25 DIAGNOSIS — D61.818 OTHER PANCYTOPENIA: ICD-10-CM

## 2018-09-25 LAB
ALBUMIN FLD-MCNC: 0.5 G/DL — SIGNIFICANT CHANGE UP
ALBUMIN SERPL ELPH-MCNC: 1.9 G/DL — LOW (ref 3.3–5)
ALP SERPL-CCNC: 156 U/L — HIGH (ref 40–120)
ALT FLD-CCNC: 180 U/L — HIGH (ref 10–45)
ANION GAP SERPL CALC-SCNC: 11 MMOL/L — SIGNIFICANT CHANGE UP (ref 5–17)
APPEARANCE UR: CLEAR — SIGNIFICANT CHANGE UP
APTT BLD: 54.4 SEC — HIGH (ref 27.5–37.4)
AST SERPL-CCNC: 550 U/L — HIGH (ref 10–40)
B PERT IGG+IGM PNL SER: CLEAR — SIGNIFICANT CHANGE UP
BACTERIA # UR AUTO: NEGATIVE — SIGNIFICANT CHANGE UP
BILIRUB SERPL-MCNC: 0.7 MG/DL — SIGNIFICANT CHANGE UP (ref 0.2–1.2)
BILIRUB UR-MCNC: NEGATIVE — SIGNIFICANT CHANGE UP
BLD GP AB SCN SERPL QL: NEGATIVE — SIGNIFICANT CHANGE UP
BUN SERPL-MCNC: 51 MG/DL — HIGH (ref 7–23)
CALCIUM SERPL-MCNC: 7.8 MG/DL — LOW (ref 8.4–10.5)
CHLORIDE SERPL-SCNC: 103 MMOL/L — SIGNIFICANT CHANGE UP (ref 96–108)
CHOLEST SERPL-MCNC: 69 MG/DL — SIGNIFICANT CHANGE UP (ref 10–199)
CO2 SERPL-SCNC: 18 MMOL/L — LOW (ref 22–31)
COLOR FLD: YELLOW — SIGNIFICANT CHANGE UP
COLOR SPEC: YELLOW — SIGNIFICANT CHANGE UP
CREAT SERPL-MCNC: 1.37 MG/DL — HIGH (ref 0.5–1.3)
D DIMER BLD IA.RAPID-MCNC: HIGH NG/ML DDU
DIFF PNL FLD: NEGATIVE — SIGNIFICANT CHANGE UP
EPI CELLS # UR: 1 /HPF — SIGNIFICANT CHANGE UP
FIBRINOGEN PPP-MCNC: 480 MG/DL — SIGNIFICANT CHANGE UP (ref 310–510)
FLUID INTAKE SUBSTANCE CLASS: SIGNIFICANT CHANGE UP
FLUID SEGMENTED GRANULOCYTES: 14 % — SIGNIFICANT CHANGE UP
GLUCOSE FLD-MCNC: 126 MG/DL — SIGNIFICANT CHANGE UP
GLUCOSE SERPL-MCNC: 128 MG/DL — HIGH (ref 70–99)
GLUCOSE UR QL: NEGATIVE — SIGNIFICANT CHANGE UP
GRAM STN FLD: SIGNIFICANT CHANGE UP
GRAN CASTS # UR COMP ASSIST: 1 — SIGNIFICANT CHANGE UP
HBA1C BLD-MCNC: 5.5 % — SIGNIFICANT CHANGE UP (ref 4–5.6)
HCT VFR BLD CALC: 29.5 % — LOW (ref 39–50)
HDLC SERPL-MCNC: 20 MG/DL — LOW
HGB BLD-MCNC: 9.7 G/DL — LOW (ref 13–17)
HYALINE CASTS # UR AUTO: 12 /LPF — HIGH (ref 0–2)
INR BLD: 1.33 RATIO — HIGH (ref 0.88–1.16)
KETONES UR-MCNC: NEGATIVE — SIGNIFICANT CHANGE UP
LACTATE SERPL-SCNC: 2.4 MMOL/L — HIGH (ref 0.7–2)
LDH SERPL L TO P-CCNC: 179 U/L — SIGNIFICANT CHANGE UP
LEUKOCYTE ESTERASE UR-ACNC: NEGATIVE — SIGNIFICANT CHANGE UP
LIPID PNL WITH DIRECT LDL SERPL: 34 MG/DL — SIGNIFICANT CHANGE UP
LYMPHOCYTES # FLD: 12 % — SIGNIFICANT CHANGE UP
MAGNESIUM SERPL-MCNC: 1.7 MG/DL — SIGNIFICANT CHANGE UP (ref 1.6–2.6)
MCHC RBC-ENTMCNC: 30 PG — SIGNIFICANT CHANGE UP (ref 27–34)
MCHC RBC-ENTMCNC: 32.8 GM/DL — SIGNIFICANT CHANGE UP (ref 32–36)
MCV RBC AUTO: 91.2 FL — SIGNIFICANT CHANGE UP (ref 80–100)
MESOTHL CELL # FLD: 23 % — SIGNIFICANT CHANGE UP
MONOS+MACROS # FLD: 44 % — SIGNIFICANT CHANGE UP
NITRITE UR-MCNC: NEGATIVE — SIGNIFICANT CHANGE UP
OTHER CELLS FLD MANUAL: 7 % — SIGNIFICANT CHANGE UP
PH UR: 5.5 — SIGNIFICANT CHANGE UP (ref 5–8)
PHOSPHATE SERPL-MCNC: 3.8 MG/DL — SIGNIFICANT CHANGE UP (ref 2.5–4.5)
PLATELET # BLD AUTO: 61 K/UL — LOW (ref 150–400)
POTASSIUM SERPL-MCNC: 4.6 MMOL/L — SIGNIFICANT CHANGE UP (ref 3.5–5.3)
POTASSIUM SERPL-SCNC: 4.6 MMOL/L — SIGNIFICANT CHANGE UP (ref 3.5–5.3)
PROT FLD-MCNC: 1.3 G/DL — SIGNIFICANT CHANGE UP
PROT SERPL-MCNC: 4.6 G/DL — LOW (ref 6–8.3)
PROT UR-MCNC: ABNORMAL
PROTHROM AB SERPL-ACNC: 14.6 SEC — HIGH (ref 9.8–12.7)
RBC # BLD: 3.24 M/UL — LOW (ref 4.2–5.8)
RBC # FLD: 19.4 % — HIGH (ref 10.3–14.5)
RBC CASTS # UR COMP ASSIST: 9 /HPF — HIGH (ref 0–4)
RCV VOL RI: 830 /UL — HIGH (ref 0–5)
RH IG SCN BLD-IMP: POSITIVE — SIGNIFICANT CHANGE UP
SODIUM SERPL-SCNC: 132 MMOL/L — LOW (ref 135–145)
SP GR SPEC: 1.02 — SIGNIFICANT CHANGE UP
SPECIMEN SOURCE FLD: SIGNIFICANT CHANGE UP
SPECIMEN SOURCE: SIGNIFICANT CHANGE UP
TOTAL CHOLESTEROL/HDL RATIO MEASUREMENT: 3.5 RATIO — SIGNIFICANT CHANGE UP (ref 3.4–9.6)
TOTAL NUCLEATED CELL COUNT, BODY FLUID: 31 /UL — HIGH (ref 0–5)
TRIGL SERPL-MCNC: 74 MG/DL — SIGNIFICANT CHANGE UP (ref 10–149)
TUBE TYPE: SIGNIFICANT CHANGE UP
UROBILINOGEN FLD QL: NEGATIVE — SIGNIFICANT CHANGE UP
WBC # BLD: 1.4 K/UL — LOW (ref 3.8–10.5)
WBC # FLD AUTO: 1.4 K/UL — LOW (ref 3.8–10.5)
WBC UR QL: 1 /HPF — SIGNIFICANT CHANGE UP (ref 0–5)

## 2018-09-25 PROCEDURE — 71250 CT THORAX DX C-: CPT | Mod: 26

## 2018-09-25 PROCEDURE — 99233 SBSQ HOSP IP/OBS HIGH 50: CPT | Mod: GC

## 2018-09-25 PROCEDURE — 76705 ECHO EXAM OF ABDOMEN: CPT | Mod: 26,59

## 2018-09-25 PROCEDURE — 99232 SBSQ HOSP IP/OBS MODERATE 35: CPT | Mod: GC

## 2018-09-25 PROCEDURE — 93975 VASCULAR STUDY: CPT | Mod: 26

## 2018-09-25 PROCEDURE — 88112 CYTOPATH CELL ENHANCE TECH: CPT | Mod: 26

## 2018-09-25 PROCEDURE — 88108 CYTOPATH CONCENTRATE TECH: CPT | Mod: 26,59

## 2018-09-25 PROCEDURE — 74176 CT ABD & PELVIS W/O CONTRAST: CPT | Mod: 26

## 2018-09-25 PROCEDURE — 88305 TISSUE EXAM BY PATHOLOGIST: CPT | Mod: 26

## 2018-09-25 PROCEDURE — 49083 ABD PARACENTESIS W/IMAGING: CPT

## 2018-09-25 RX ORDER — VANCOMYCIN HCL 1 G
1000 VIAL (EA) INTRAVENOUS ONCE
Qty: 0 | Refills: 0 | Status: COMPLETED | OUTPATIENT
Start: 2018-09-25 | End: 2018-09-25

## 2018-09-25 RX ORDER — ENOXAPARIN SODIUM 100 MG/ML
140 INJECTION SUBCUTANEOUS DAILY
Qty: 0 | Refills: 0 | Status: DISCONTINUED | OUTPATIENT
Start: 2018-09-25 | End: 2018-10-01

## 2018-09-25 RX ORDER — PIPERACILLIN AND TAZOBACTAM 4; .5 G/20ML; G/20ML
3.38 INJECTION, POWDER, LYOPHILIZED, FOR SOLUTION INTRAVENOUS EVERY 8 HOURS
Qty: 0 | Refills: 0 | Status: DISCONTINUED | OUTPATIENT
Start: 2018-09-25 | End: 2018-09-27

## 2018-09-25 RX ORDER — HYDROCORTISONE 1 %
1 OINTMENT (GRAM) TOPICAL
Qty: 0 | Refills: 0 | Status: DISCONTINUED | OUTPATIENT
Start: 2018-09-25 | End: 2018-09-26

## 2018-09-25 RX ORDER — SODIUM CHLORIDE 0.65 %
1 AEROSOL, SPRAY (ML) NASAL ONCE
Qty: 0 | Refills: 0 | Status: DISCONTINUED | OUTPATIENT
Start: 2018-09-25 | End: 2018-10-02

## 2018-09-25 RX ORDER — FILGRASTIM 480MCG/1.6
480 VIAL (ML) INJECTION ONCE
Qty: 0 | Refills: 0 | Status: COMPLETED | OUTPATIENT
Start: 2018-09-25 | End: 2018-09-25

## 2018-09-25 RX ADMIN — DIPHENHYDRAMINE HYDROCHLORIDE AND LIDOCAINE HYDROCHLORIDE AND ALUMINUM HYDROXIDE AND MAGNESIUM HYDRO 15 MILLILITER(S): KIT at 18:21

## 2018-09-25 RX ADMIN — PIPERACILLIN AND TAZOBACTAM 25 GRAM(S): 4; .5 INJECTION, POWDER, LYOPHILIZED, FOR SOLUTION INTRAVENOUS at 06:44

## 2018-09-25 RX ADMIN — DIPHENHYDRAMINE HYDROCHLORIDE AND LIDOCAINE HYDROCHLORIDE AND ALUMINUM HYDROXIDE AND MAGNESIUM HYDRO 15 MILLILITER(S): KIT at 04:38

## 2018-09-25 RX ADMIN — Medication 480 MICROGRAM(S): at 18:20

## 2018-09-25 RX ADMIN — LORATADINE 10 MILLIGRAM(S): 10 TABLET ORAL at 12:44

## 2018-09-25 RX ADMIN — PIPERACILLIN AND TAZOBACTAM 25 GRAM(S): 4; .5 INJECTION, POWDER, LYOPHILIZED, FOR SOLUTION INTRAVENOUS at 13:55

## 2018-09-25 RX ADMIN — ENOXAPARIN SODIUM 140 MILLIGRAM(S): 100 INJECTION SUBCUTANEOUS at 22:06

## 2018-09-25 RX ADMIN — DIPHENHYDRAMINE HYDROCHLORIDE AND LIDOCAINE HYDROCHLORIDE AND ALUMINUM HYDROXIDE AND MAGNESIUM HYDRO 15 MILLILITER(S): KIT at 12:44

## 2018-09-25 RX ADMIN — SODIUM CHLORIDE 50 MILLILITER(S): 9 INJECTION INTRAMUSCULAR; INTRAVENOUS; SUBCUTANEOUS at 18:21

## 2018-09-25 RX ADMIN — Medication 1 APPLICATION(S): at 18:21

## 2018-09-25 RX ADMIN — SODIUM CHLORIDE 50 MILLILITER(S): 9 INJECTION INTRAMUSCULAR; INTRAVENOUS; SUBCUTANEOUS at 22:06

## 2018-09-25 RX ADMIN — PIPERACILLIN AND TAZOBACTAM 25 GRAM(S): 4; .5 INJECTION, POWDER, LYOPHILIZED, FOR SOLUTION INTRAVENOUS at 22:06

## 2018-09-25 RX ADMIN — Medication 250 MILLIGRAM(S): at 22:06

## 2018-09-25 NOTE — PROGRESS NOTE ADULT - PROBLEM SELECTOR PLAN 3
tp w/ chronic ascites 2/2 malignancy. was therapeutically tapped on 9/20  -will need paracentesis in the AM  -c/w abx as above tp w/ chronic ascites 2/2 malignancy. was therapeutically tapped on 9/20  -consulted IR for diag para   -c/w abx as above

## 2018-09-25 NOTE — PROGRESS NOTE ADULT - PROBLEM SELECTOR PLAN 5
Shock liver vs mets to liver vs [facility] Chiari   -f/u CT non con (may need CT with contrast, however, will hold off in setting of ROSEMARY   -will order a RUQ liver u/s w/ doppler Shock liver vs mets to liver vs [facility] Chiari  -CT A/P showing mets to liver   -RUQ US showing PVT, will start AC

## 2018-09-25 NOTE — PROGRESS NOTE ADULT - PROBLEM SELECTOR PLAN 10
VTE: SCDs in setting of thrombocytopenia  Diet: regular (switch to DASH once BP improves)    KIT Milian MD-PGY2  Internal Medicine Department  Pager: [phone] / [phone] VTE: SCDs in setting of thrombocytopenia  Diet: regular (switch to DASH once BP improves)

## 2018-09-25 NOTE — PROGRESS NOTE ADULT - PROBLEM SELECTOR PLAN 2
- chemo on hold given acute sepsis  - continue IV antibiotics and fluids   - pending blood culture results and results from paracentesis to determine etiology of infection

## 2018-09-25 NOTE — PROGRESS NOTE ADULT - ATTENDING COMMENTS
as above.    Sepsis - improved, c/w Zosyn, hold Vanco, awaiting diagnostic paracentesis to eval for bacterial peritonitis.  ROSEMARY - improving.  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  Papular Rash - pt with papular rash on the face presumed to be from his Chemo, will start on low dose topical hydrocortisone.

## 2018-09-25 NOTE — PROVIDER CONTACT NOTE (CRITICAL VALUE NOTIFICATION) - TEST AND RESULT REPORTED:
1st set preliminary blood culture positive for growth in anaerobic bottle for gram negative rods  2nd set preliminary blood culture positive for growth in anaerobic bottle for gram positive rods  3rd set preliminary blood culture positive for growth in anaerobic bottle for gram positive rods

## 2018-09-25 NOTE — PROGRESS NOTE ADULT - PROBLEM SELECTOR PLAN 1
tachypneic, febrile, tachycardic, possible PNA vs SBP as source.  -s/p Vanc/Cefepime and 4.7LNS  -will switch to Vanc/Zosyn for anaerobic coverage  -CXR showing atelectasis bilaterally  -lactate has been clearing with fluids, will continue 50cc/hr overnight  -will order a CT chest, abd, pelvis  -f/u blood and urine clxs tachypneic, febrile, tachycardic, possible PNA vs SBP as source.  -s/p Vanc/Cefepime and 4.7LNS  -was switched to Vanc/Zosyn for anaerobic coverage  -CXR showing atelectasis bilaterally  -lactate has been clearing with fluids, on fluids 50 cc/hr  -CT chest with no obvious consolidation, d/c vanc   -f/u blood and urine cx

## 2018-09-25 NOTE — PROGRESS NOTE ADULT - PROBLEM SELECTOR PLAN 1
- US abdomen findings concerning for portal vein thrombosis  - okay to start heparin for anticoagulation at this time --> if platelets drop below 50,000 can discontinue - US abdomen findings concerning for portal vein thrombosis  - if Cr is acceptable, would start therapeutic Lovenox instead of heparin

## 2018-09-25 NOTE — PROGRESS NOTE ADULT - ASSESSMENT
68M pmh metastatic colon ca (currently on chemo, last was 2 weeks ago), HTN admitted for sepsis, PNA vs SBP.

## 2018-09-25 NOTE — PROGRESS NOTE ADULT - SUBJECTIVE AND OBJECTIVE BOX
Patient is a 68y old  Male who presents with a chief complaint of Sepsis (24 Sep 2018 17:37)      SUBJECTIVE / OVERNIGHT EVENTS: Patient seen and examined at bedside.     HPI:  68M pmh metastatic colo ca (currently on chemo, last was 2 weeks ago), HTN, chronic ascitics now presenting to the ED for weakness, worsening cough, fevers to 103 at home. Pt reports that starting Friday, he has been feeling weak and ill. His wife reports that he has been having subjective fevers, which have been getting worse. Pt took his temp on morning of admission which was found to be 103F. Pt's wife gave him 2 tylenol and brought him to the ED. Pt reports that he had a paracentesis performed on 9/20 for therapeutic purposes. Pt reports that his weight has not increased form the time of the therapeutic tap, however, he does feel more bloated. Pt describes that he has been having a chronic cough for over a year, which he reports is 2/2 postnasal drip, for which he tales Claritin. Pt denies night sweats, hemoptysis, n/v/d/c, abd pain, chest pain, pain on urination.     In the ED:   Vitals: 99.5, 115->73, 94/60, rr24 97% on 3L  Notable Labs and Imaging: CXR bibasilar platelike atelectasis; Lactate 6.6->4.3->2.9;   Given: 4.7L NS, Cefepime, Vanc (24 Sep 2018 17:27)      MEDICATIONS  (STANDING):  FIRST- Mouthwash  BLM 15 milliLiter(s) Swish and Spit every 6 hours  influenza   Vaccine 0.5 milliLiter(s) IntraMuscular once  loratadine 10 milliGRAM(s) Oral daily  piperacillin/tazobactam IVPB. 3.375 Gram(s) IV Intermittent every 12 hours  sodium chloride 0.9%. 1000 milliLiter(s) (50 mL/Hr) IV Continuous <Continuous>  vancomycin  IVPB 1000 milliGRAM(s) IV Intermittent every 24 hours    MEDICATIONS  (PRN):  acetaminophen   Tablet .. 650 milliGRAM(s) Oral every 6 hours PRN Temp greater or equal to 38C (100.4F), Mild Pain (1 - 3), Moderate Pain (4 - 6)  simethicone 80 milliGRAM(s) Chew two times a day PRN Gas      Vital Signs Last 24 Hrs  T(C): 36.9 (25 Sep 2018 05:37), Max: 37.5 (24 Sep 2018 12:12)  T(F): 98.4 (25 Sep 2018 05:37), Max: 99.5 (24 Sep 2018 12:12)  HR: 91 (25 Sep 2018 05:37) (73 - 115)  BP: 106/68 (25 Sep 2018 05:37) (79/62 - 115/68)  BP(mean): 73 (24 Sep 2018 16:22) (73 - 73)  RR: 16 (25 Sep 2018 05:37) (16 - 26)  SpO2: 93% (25 Sep 2018 05:37) (90% - 98%)  CAPILLARY BLOOD GLUCOSE      POCT Blood Glucose.: 141 mg/dL (24 Sep 2018 12:23)    I&O's Summary    24 Sep 2018 07:01  -  25 Sep 2018 07:00  --------------------------------------------------------  IN: 1150 mL / OUT: 400 mL / NET: 750 mL        PHYSICAL EXAM:  GENERAL: NAD, well-developed  HEAD:  Atraumatic, Normocephalic  EYES: EOMI, PERRLA, conjunctiva and sclera clear  NECK: Supple, No JVD  CHEST/LUNG: Clear to auscultation bilaterally; No wheeze  HEART: Regular rate and rhythm; No murmurs, rubs, or gallops  ABDOMEN: Soft, Nontender, Nondistended; Bowel sounds present  EXTREMITIES:  2+ Peripheral Pulses, No clubbing, cyanosis, or edema  PSYCH: AAOx3  NEUROLOGY: non-focal  SKIN: No rashes or lesions    LABS:                        11.0   5.1   )-----------( 80       ( 24 Sep 2018 12:35 )             34.2     09-25    132<L>  |  103  |  51<H>  ----------------------------<  128<H>  4.6   |  18<L>  |  1.37<H>    Ca    7.8<L>      25 Sep 2018 07:18  Phos  3.8     09-25  Mg     1.7     09-25    TPro  4.6<L>  /  Alb  1.9<L>  /  TBili  0.7  /  DBili  x   /  AST  550<H>  /  ALT  180<H>  /  AlkPhos  156<H>  09-25    PT/INR - ( 25 Sep 2018 07:18 )   PT: 14.6 sec;   INR: 1.33 ratio         PTT - ( 25 Sep 2018 07:18 )  PTT:54.4 sec          RADIOLOGY & ADDITIONAL TESTS:    Imaging Personally Reviewed:    Consultant(s) Notes Reviewed:      Care Discussed with Consultants/Other Providers: Patient is a 68y old  Male who presents with a chief complaint of Sepsis (24 Sep 2018 17:37)      SUBJECTIVE / OVERNIGHT EVENTS: Patient seen and examined at bedside. Wife was cleaning patient and placing him on commode. Per patient and his wife, patient had paracentesis done on 9/20 with improvement of abdominal distension. Since the procedure, he has been having more abdominal distension. Last chemo was 2 weeks ago. Was febrile to 103 at home.     HPI:  68M pmh metastatic colo ca (currently on chemo, last was 2 weeks ago), HTN, chronic ascitics now presenting to the ED for weakness, worsening cough, fevers to 103 at home. Pt reports that starting Friday, he has been feeling weak and ill. His wife reports that he has been having subjective fevers, which have been getting worse. Pt took his temp on morning of admission which was found to be 103F. Pt's wife gave him 2 tylenol and brought him to the ED. Pt reports that he had a paracentesis performed on 9/20 for therapeutic purposes. Pt reports that his weight has not increased form the time of the therapeutic tap, however, he does feel more bloated. Pt describes that he has been having a chronic cough for over a year, which he reports is 2/2 postnasal drip, for which he tales Claritin. Pt denies night sweats, hemoptysis, n/v/d/c, abd pain, chest pain, pain on urination.     In the ED:   Vitals: 99.5, 115->73, 94/60, rr24 97% on 3L  Notable Labs and Imaging: CXR bibasilar platelike atelectasis; Lactate 6.6->4.3->2.9;   Given: 4.7L NS, Cefepime, Vanc (24 Sep 2018 17:27)      MEDICATIONS  (STANDING):  FIRST- Mouthwash  BLM 15 milliLiter(s) Swish and Spit every 6 hours  influenza   Vaccine 0.5 milliLiter(s) IntraMuscular once  loratadine 10 milliGRAM(s) Oral daily  piperacillin/tazobactam IVPB. 3.375 Gram(s) IV Intermittent every 12 hours  sodium chloride 0.9%. 1000 milliLiter(s) (50 mL/Hr) IV Continuous <Continuous>  vancomycin  IVPB 1000 milliGRAM(s) IV Intermittent every 24 hours    MEDICATIONS  (PRN):  acetaminophen   Tablet .. 650 milliGRAM(s) Oral every 6 hours PRN Temp greater or equal to 38C (100.4F), Mild Pain (1 - 3), Moderate Pain (4 - 6)  simethicone 80 milliGRAM(s) Chew two times a day PRN Gas      Vital Signs Last 24 Hrs  T(C): 36.9 (25 Sep 2018 05:37), Max: 37.5 (24 Sep 2018 12:12)  T(F): 98.4 (25 Sep 2018 05:37), Max: 99.5 (24 Sep 2018 12:12)  HR: 91 (25 Sep 2018 05:37) (73 - 115)  BP: 106/68 (25 Sep 2018 05:37) (79/62 - 115/68)  BP(mean): 73 (24 Sep 2018 16:22) (73 - 73)  RR: 16 (25 Sep 2018 05:37) (16 - 26)  SpO2: 93% (25 Sep 2018 05:37) (90% - 98%)  CAPILLARY BLOOD GLUCOSE      POCT Blood Glucose.: 141 mg/dL (24 Sep 2018 12:23)    I&O's Summary    24 Sep 2018 07:01  -  25 Sep 2018 07:00  --------------------------------------------------------  IN: 1150 mL / OUT: 400 mL / NET: 750 mL        PHYSICAL EXAM:  GENERAL: NAD, well-developed  HEAD:  Atraumatic, Normocephalic  EYES: EOMI, PERRLA, conjunctiva and sclera clear  NECK: Supple, No JVD  CHEST/LUNG: Clear to auscultation bilaterally; No wheeze  HEART: Regular rate and rhythm; No murmurs, rubs, or gallops  ABDOMEN: Soft, Nontender, Nondistended; Bowel sounds present  EXTREMITIES:  2+ Peripheral Pulses, No clubbing, cyanosis, or edema  PSYCH: AAOx3  NEUROLOGY: non-focal  SKIN: No rashes or lesions    LABS:                        11.0   5.1   )-----------( 80       ( 24 Sep 2018 12:35 )             34.2     09-25    132<L>  |  103  |  51<H>  ----------------------------<  128<H>  4.6   |  18<L>  |  1.37<H>    Ca    7.8<L>      25 Sep 2018 07:18  Phos  3.8     09-25  Mg     1.7     09-25    TPro  4.6<L>  /  Alb  1.9<L>  /  TBili  0.7  /  DBili  x   /  AST  550<H>  /  ALT  180<H>  /  AlkPhos  156<H>  09-25    PT/INR - ( 25 Sep 2018 07:18 )   PT: 14.6 sec;   INR: 1.33 ratio         PTT - ( 25 Sep 2018 07:18 )  PTT:54.4 sec          RADIOLOGY & ADDITIONAL TESTS:    Imaging Personally Reviewed:    Consultant(s) Notes Reviewed:      Care Discussed with Consultants/Other Providers: Patient is a 68y old  Male who presents with a chief complaint of Sepsis (24 Sep 2018 17:37)      SUBJECTIVE / OVERNIGHT EVENTS: Patient seen and examined at bedside. Wife was cleaning patient and placing him on commode. Per patient and his wife, patient had paracentesis done on 9/20 with improvement of abdominal distension. Since the procedure, he has been having more abdominal distension. Last chemo was 2 weeks ago. Was febrile to 103 at home.     HPI:  68M pmh metastatic colo ca (currently on chemo, last was 2 weeks ago), HTN, chronic ascitics now presenting to the ED for weakness, worsening cough, fevers to 103 at home. Pt reports that starting Friday, he has been feeling weak and ill. His wife reports that he has been having subjective fevers, which have been getting worse. Pt took his temp on morning of admission which was found to be 103F. Pt's wife gave him 2 tylenol and brought him to the ED. Pt reports that he had a paracentesis performed on 9/20 for therapeutic purposes. Pt reports that his weight has not increased form the time of the therapeutic tap, however, he does feel more bloated. Pt describes that he has been having a chronic cough for over a year, which he reports is 2/2 postnasal drip, for which he tales Claritin. Pt denies night sweats, hemoptysis, n/v/d/c, abd pain, chest pain, pain on urination.     In the ED:   Vitals: 99.5, 115->73, 94/60, rr24 97% on 3L  Notable Labs and Imaging: CXR bibasilar platelike atelectasis; Lactate 6.6->4.3->2.9;   Given: 4.7L NS, Cefepime, Vanc (24 Sep 2018 17:27)      MEDICATIONS  (STANDING):  FIRST- Mouthwash  BLM 15 milliLiter(s) Swish and Spit every 6 hours  influenza   Vaccine 0.5 milliLiter(s) IntraMuscular once  loratadine 10 milliGRAM(s) Oral daily  piperacillin/tazobactam IVPB. 3.375 Gram(s) IV Intermittent every 12 hours  sodium chloride 0.9%. 1000 milliLiter(s) (50 mL/Hr) IV Continuous <Continuous>  vancomycin  IVPB 1000 milliGRAM(s) IV Intermittent every 24 hours    MEDICATIONS  (PRN):  acetaminophen   Tablet .. 650 milliGRAM(s) Oral every 6 hours PRN Temp greater or equal to 38C (100.4F), Mild Pain (1 - 3), Moderate Pain (4 - 6)  simethicone 80 milliGRAM(s) Chew two times a day PRN Gas      Vital Signs Last 24 Hrs  T(C): 36.9 (25 Sep 2018 05:37), Max: 37.5 (24 Sep 2018 12:12)  T(F): 98.4 (25 Sep 2018 05:37), Max: 99.5 (24 Sep 2018 12:12)  HR: 91 (25 Sep 2018 05:37) (73 - 115)  BP: 106/68 (25 Sep 2018 05:37) (79/62 - 115/68)  BP(mean): 73 (24 Sep 2018 16:22) (73 - 73)  RR: 16 (25 Sep 2018 05:37) (16 - 26)  SpO2: 93% (25 Sep 2018 05:37) (90% - 98%)  CAPILLARY BLOOD GLUCOSE      POCT Blood Glucose.: 141 mg/dL (24 Sep 2018 12:23)    I&O's Summary    24 Sep 2018 07:01  -  25 Sep 2018 07:00  --------------------------------------------------------  IN: 1150 mL / OUT: 400 mL / NET: 750 mL        PHYSICAL EXAM:  GENERAL: NAD, well-developed  HEAD:  Atraumatic, Normocephalic  EYES: EOMI, PERRLA, conjunctiva and sclera clear  NECK: Supple, No JVD  CHEST/LUNG: Clear to auscultation bilaterally; No wheeze  HEART: Regular rate and rhythm; No murmurs, rubs, or gallops  ABDOMEN: Soft, Nontender, Nondistended; Bowel sounds present  EXTREMITIES:  2+ Peripheral Pulses, No clubbing, cyanosis, or edema  PSYCH: AAOx3  NEUROLOGY: non-focal  SKIN: papular eruption noted on face     LABS:                        11.0   5.1   )-----------( 80       ( 24 Sep 2018 12:35 )             34.2     09-25    132<L>  |  103  |  51<H>  ----------------------------<  128<H>  4.6   |  18<L>  |  1.37<H>    Ca    7.8<L>      25 Sep 2018 07:18  Phos  3.8     09-25  Mg     1.7     09-25    TPro  4.6<L>  /  Alb  1.9<L>  /  TBili  0.7  /  DBili  x   /  AST  550<H>  /  ALT  180<H>  /  AlkPhos  156<H>  09-25    PT/INR - ( 25 Sep 2018 07:18 )   PT: 14.6 sec;   INR: 1.33 ratio         PTT - ( 25 Sep 2018 07:18 )  PTT:54.4 sec          RADIOLOGY & ADDITIONAL TESTS:    Imaging Personally Reviewed:    Consultant(s) Notes Reviewed:      Care Discussed with Consultants/Other Providers:

## 2018-09-25 NOTE — PROGRESS NOTE ADULT - SUBJECTIVE AND OBJECTIVE BOX
ONCOLOGY PROGRESS NOTE      Subjective: Overnight, patient's pressures remained in the 80s-90s. Patient was asymptomatic. This morning, the patient feels slightly better. Denies any SOB, CP, nausea, vomiting, or abdominal pain. Patient and wife noted that he has a new rash around his mouth and the wife syas he was told it was likely to occur secondary to his new chemotherapy regimen.         VITAL SIGNS:  Vital Signs Last 24 Hrs  T(C): 36.7 (25 Sep 2018 09:51), Max: 37.1 (24 Sep 2018 16:22)  T(F): 98.1 (25 Sep 2018 09:51), Max: 98.8 (24 Sep 2018 16:22)  HR: 99 (25 Sep 2018 09:51) (73 - 99)  BP: 113/70 (25 Sep 2018 09:51) (82/46 - 113/70)  BP(mean): 73 (24 Sep 2018 16:22) (73 - 73)  RR: 20 (25 Sep 2018 09:51) (16 - 24)  SpO2: 99% (25 Sep 2018 09:51) (93% - 99%)      PHYSICAL EXAM:     GENERAL: no acute distress  HEENT: acneiform pustular rash noted around mouth, no bleeding, excoriations noted   RESPIRATORY: CTAB, no wheezes or crackles  CARDIOVASCULAR: RRR, no murmurs  ABDOMINAL: soft, non-tender, non-distended, positive bowel sounds   EXTREMITIES: 1+ edema bilaterally   NEUROLOGICAL: alert and oriented x 3, non-focal  SKIN: no rashes or lesions   MUSCULOSKELETAL: no gross joint deformity                          9.7    1.4   )-----------( 61       ( 25 Sep 2018 07:18 )             29.5     09-25    132<L>  |  103  |  51<H>  ----------------------------<  128<H>  4.6   |  18<L>  |  1.37<H>    Ca    7.8<L>      25 Sep 2018 07:18  Phos  3.8     09-25  Mg     1.7     09-25    TPro  4.6<L>  /  Alb  1.9<L>  /  TBili  0.7  /  DBili  x   /  AST  550<H>  /  ALT  180<H>  /  AlkPhos  156<H>  09-25      CAPILLARY BLOOD GLUCOSE          MEDICATIONS  (STANDING):  FIRST- Mouthwash  BLM 15 milliLiter(s) Swish and Spit every 6 hours  hydrocortisone 0.5% Cream 1 Application(s) Topical two times a day  influenza   Vaccine 0.5 milliLiter(s) IntraMuscular once  loratadine 10 milliGRAM(s) Oral daily  piperacillin/tazobactam IVPB. 3.375 Gram(s) IV Intermittent every 8 hours  sodium chloride 0.9%. 1000 milliLiter(s) (50 mL/Hr) IV Continuous <Continuous>    MEDICATIONS  (PRN):  acetaminophen   Tablet .. 650 milliGRAM(s) Oral every 6 hours PRN Temp greater or equal to 38C (100.4F), Mild Pain (1 - 3), Moderate Pain (4 - 6)  simethicone 80 milliGRAM(s) Chew two times a day PRN Gas      RADIOLOGY    < from: CT Chest No Cont (09.25.18 @ 09:20) >  FINDINGS:    CHEST:     LUNGS AND LARGE AIRWAYS: Bibasilar subsegmental atelectasis. Scattered   bilateral pulmonary nodules, measuring up to 0.5 cm in the left lower   lobe (series 3, image 72) and 0.6 cm in the right lower lobe (series 3,   image 75).  PLEURA: Small bilateral pleural effusions.  VESSELS: Right chest wall port with catheter tip terminating in the   superior cavoatrial junction. Atherosclerotic calcifications.  HEART: Heart size is normal. No pericardial effusion.  MEDIASTINUM AND MARIBELL: Subcentimeter short axis mediastinal lymph nodes.  CHEST WALL AND LOWER NECK: Right chest wall port.    ABDOMEN AND PELVIS:    LIVER: Cirrhosis. Increased size of bilobar hepatic metastases. For   example:  *  Dominant mass in the right hepatic lobe (series 3, image 23),   measuring 12.0 cm, previously 5.3 cm  * Segment 4A (series 3, image 142), measuring 1.3 cm, previously 0.6 cm  BILE DUCTS: Normal caliber.   GALLBLADDER: Within normal limits.  SPLEEN: Within normal limits.  PANCREAS: Within normal limits.  ADRENALS: Within normal limits.  KIDNEYS/URETERS: Within normal limits.     BLADDER: Within normal limits.  REPRODUCTIVE ORGANS: The prostate is enlarged.    BOWEL: Stent within the distal transverse colon. No bowel obstruction.   Colonic diverticulosis.    PERITONEUM: Moderate volume abdominopelvic ascites. Peritoneal   nodularity, concerning for peritoneal carcinomatosis.  VESSELS:  Atherosclerotic change of the abdominal aorta and its branches.  RETROPERITONEUM: Unchanged periaortic adenopathy.    ABDOMINAL WALL: Anasarca.  BONES: Degenerative changes of the spine.    IMPRESSION:   Since September 25, 2018, increased size of hepatic metastatic disease.    Moderate volume abdominopelvic ascites.    Peritoneal nodularity, concerning for peritoneal carcinomatosis.    Possible pulmonary metastases.    Small bilateral pleural effusions with associated atelectasis.      PREMA SANCHES M.D., ATTENDING RADIOLOGIST  This document has been electronically signed. Sep 25 2018  9:43AM        < end of copied text >    < from: US Abdomen Upper Quadrant Right (09.25.18 @ 11:52) >  IMPRESSION:     Extensive hepatic metastatic disease.     Patent main portal vein and left portal vein with hepatopedal flow.   Patent left and middle hepatic veins.    The right portal vein and right hepatic vein are not visualized, limited   by marked heterogeneity of the right hepatic lobe secondary to metastatic   disease. 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     Contracted gallbladder. No biliary ductal dilatation.        HYUN SENIOR M.D., RADIOLOGY FELLOW  This document has been electronically signed.  CALLUM SAVAGE M.D., ATTENDING RADIOLOGIST  This document has been electronically signed. Sep 25 2018 12:08PM    < end of copied text >

## 2018-09-25 NOTE — PROGRESS NOTE ADULT - ASSESSMENT
Patient is a 69 y/o M with a PMH significant for metastatic colon cancer on chemotherapy who presents with sepsis likely secondary to an intra-abdominal infection.

## 2018-09-25 NOTE — PROGRESS NOTE ADULT - PROBLEM SELECTOR PLAN 3
- may be secondary to sepsis vs recent chemo versus DIC  - added on a differential to the CBC to assess for neutropenia  - may consider checking a factor VIII level to determine whether patient is having consumption of all coagulation factors secondary to systemic process like DIC versus a more localized liver dysfunction - may be secondary to sepsis vs recent chemo versus DIC  - added on a differential to the CBC to assess for neutropenia --> patient with , will start zarxio and monitor CBC with diff carefully

## 2018-09-25 NOTE — PROGRESS NOTE ADULT - PROBLEM SELECTOR PLAN 2
concerning changes on CXR  -f/u CT  -c/w abx as above concerning changes on CXR. ct chest showing b/l small pleural effusions and atelectasis   -d/c'd vanc

## 2018-09-25 NOTE — PROGRESS NOTE ADULT - SUBJECTIVE AND OBJECTIVE BOX
Interventional Radiology Brief- Operative Note    Procedure: Diagnostic Paracentesis    Operators: Susan Monge    Anesthesia (type): None.    Contrast: None.    EBL: None.    Findings/Follow up Plan of Care: 60 cc of clear yellow ascites fluid was aspirated and sent for fluid analysis. Follow-up fluid analysis results.    Specimens Removed: 60 cc clear yellow ascites.    Implants: None.    Complications: None.    Condition/Disposition: Back to room.    Please call Interventional Radiology x [phone] with any questions, concerns, or issues.

## 2018-09-25 NOTE — PROGRESS NOTE ADULT - ATTENDING COMMENTS
Agree with above.   Sepsis is improving, ARF is resolving. LFTs remain elevated. Found to have PVT. STarted on Lovenox. Plt are 60 without bleeding. Diagnostic para to be performed today. Chemo on hold. Recommend Neupogen x1 dose today due to ANC <1000.   Encouraged pt to get out of bed.

## 2018-09-26 DIAGNOSIS — L27.0 GENERALIZED SKIN ERUPTION DUE TO DRUGS AND MEDICAMENTS TAKEN INTERNALLY: ICD-10-CM

## 2018-09-26 DIAGNOSIS — R78.81 BACTEREMIA: ICD-10-CM

## 2018-09-26 LAB
ALBUMIN SERPL ELPH-MCNC: 1.8 G/DL — LOW (ref 3.3–5)
ALP SERPL-CCNC: 212 U/L — HIGH (ref 40–120)
ALT FLD-CCNC: 149 U/L — HIGH (ref 10–45)
ANION GAP SERPL CALC-SCNC: 9 MMOL/L — SIGNIFICANT CHANGE UP (ref 5–17)
APTT BLD: 90 SEC — HIGH (ref 27.5–37.4)
AST SERPL-CCNC: 382 U/L — HIGH (ref 10–40)
BASOPHILS # BLD AUTO: 0 K/UL — SIGNIFICANT CHANGE UP (ref 0–0.2)
BASOPHILS NFR BLD AUTO: 0 % — SIGNIFICANT CHANGE UP (ref 0–2)
BILIRUB SERPL-MCNC: 0.9 MG/DL — SIGNIFICANT CHANGE UP (ref 0.2–1.2)
BUN SERPL-MCNC: 38 MG/DL — HIGH (ref 7–23)
CALCIUM SERPL-MCNC: 7.5 MG/DL — LOW (ref 8.4–10.5)
CHLORIDE SERPL-SCNC: 104 MMOL/L — SIGNIFICANT CHANGE UP (ref 96–108)
CO2 SERPL-SCNC: 20 MMOL/L — LOW (ref 22–31)
COMMENT - FLUIDS: SIGNIFICANT CHANGE UP
CREAT SERPL-MCNC: 1.15 MG/DL — SIGNIFICANT CHANGE UP (ref 0.5–1.3)
CULTURE RESULTS: SIGNIFICANT CHANGE UP
EOSINOPHIL # BLD AUTO: 0 K/UL — SIGNIFICANT CHANGE UP (ref 0–0.5)
EOSINOPHIL NFR BLD AUTO: 1.2 % — SIGNIFICANT CHANGE UP (ref 0–6)
GLUCOSE SERPL-MCNC: 84 MG/DL — SIGNIFICANT CHANGE UP (ref 70–99)
GRAM STN FLD: SIGNIFICANT CHANGE UP
HCT VFR BLD CALC: 27.7 % — LOW (ref 39–50)
HGB BLD-MCNC: 9.1 G/DL — LOW (ref 13–17)
INR BLD: 1.45 RATIO — HIGH (ref 0.88–1.16)
LYMPHOCYTES # BLD AUTO: 0.7 K/UL — LOW (ref 1–3.3)
LYMPHOCYTES # BLD AUTO: 18.1 % — SIGNIFICANT CHANGE UP (ref 13–44)
MAGNESIUM SERPL-MCNC: 1.5 MG/DL — LOW (ref 1.6–2.6)
MCHC RBC-ENTMCNC: 29.7 PG — SIGNIFICANT CHANGE UP (ref 27–34)
MCHC RBC-ENTMCNC: 32.8 GM/DL — SIGNIFICANT CHANGE UP (ref 32–36)
MCV RBC AUTO: 90.5 FL — SIGNIFICANT CHANGE UP (ref 80–100)
MONOCYTES # BLD AUTO: 0.6 K/UL — SIGNIFICANT CHANGE UP (ref 0–0.9)
MONOCYTES NFR BLD AUTO: 17.4 % — HIGH (ref 2–14)
NEUTROPHILS # BLD AUTO: 2.3 K/UL — SIGNIFICANT CHANGE UP (ref 1.8–7.4)
NEUTROPHILS NFR BLD AUTO: 63.3 % — SIGNIFICANT CHANGE UP (ref 43–77)
PHOSPHATE SERPL-MCNC: 2.3 MG/DL — LOW (ref 2.5–4.5)
PLATELET # BLD AUTO: 76 K/UL — LOW (ref 150–400)
POTASSIUM SERPL-MCNC: 4.2 MMOL/L — SIGNIFICANT CHANGE UP (ref 3.5–5.3)
POTASSIUM SERPL-SCNC: 4.2 MMOL/L — SIGNIFICANT CHANGE UP (ref 3.5–5.3)
PROT SERPL-MCNC: 4.2 G/DL — LOW (ref 6–8.3)
PROTHROM AB SERPL-ACNC: 15.8 SEC — HIGH (ref 9.8–12.7)
RBC # BLD: 3.06 M/UL — LOW (ref 4.2–5.8)
RBC # FLD: 19.5 % — HIGH (ref 10.3–14.5)
SODIUM SERPL-SCNC: 133 MMOL/L — LOW (ref 135–145)
SPECIMEN SOURCE: SIGNIFICANT CHANGE UP
WBC # BLD: 3.7 K/UL — LOW (ref 3.8–10.5)
WBC # FLD AUTO: 3.7 K/UL — LOW (ref 3.8–10.5)

## 2018-09-26 PROCEDURE — 99223 1ST HOSP IP/OBS HIGH 75: CPT | Mod: GC

## 2018-09-26 PROCEDURE — 99233 SBSQ HOSP IP/OBS HIGH 50: CPT | Mod: GC

## 2018-09-26 RX ORDER — MAGNESIUM OXIDE 400 MG ORAL TABLET 241.3 MG
400 TABLET ORAL
Qty: 0 | Refills: 0 | Status: COMPLETED | OUTPATIENT
Start: 2018-09-26 | End: 2018-09-26

## 2018-09-26 RX ORDER — HYDROCORTISONE 1 %
1 OINTMENT (GRAM) TOPICAL
Qty: 0 | Refills: 0 | Status: DISCONTINUED | OUTPATIENT
Start: 2018-09-26 | End: 2018-10-02

## 2018-09-26 RX ORDER — VANCOMYCIN HCL 1 G
750 VIAL (EA) INTRAVENOUS ONCE
Qty: 0 | Refills: 0 | Status: DISCONTINUED | OUTPATIENT
Start: 2018-09-26 | End: 2018-09-26

## 2018-09-26 RX ORDER — SODIUM,POTASSIUM PHOSPHATES 278-250MG
1 POWDER IN PACKET (EA) ORAL EVERY 4 HOURS
Qty: 0 | Refills: 0 | Status: COMPLETED | OUTPATIENT
Start: 2018-09-26 | End: 2018-09-26

## 2018-09-26 RX ORDER — VANCOMYCIN HCL 1 G
1000 VIAL (EA) INTRAVENOUS EVERY 12 HOURS
Qty: 0 | Refills: 0 | Status: DISCONTINUED | OUTPATIENT
Start: 2018-09-26 | End: 2018-09-27

## 2018-09-26 RX ORDER — POLYETHYLENE GLYCOL 3350 17 G/17G
17 POWDER, FOR SOLUTION ORAL
Qty: 0 | Refills: 0 | Status: DISCONTINUED | OUTPATIENT
Start: 2018-09-26 | End: 2018-09-29

## 2018-09-26 RX ORDER — VANCOMYCIN HCL 1 G
VIAL (EA) INTRAVENOUS
Qty: 0 | Refills: 0 | Status: DISCONTINUED | OUTPATIENT
Start: 2018-09-26 | End: 2018-09-26

## 2018-09-26 RX ADMIN — DIPHENHYDRAMINE HYDROCHLORIDE AND LIDOCAINE HYDROCHLORIDE AND ALUMINUM HYDROXIDE AND MAGNESIUM HYDRO 15 MILLILITER(S): KIT at 13:16

## 2018-09-26 RX ADMIN — DIPHENHYDRAMINE HYDROCHLORIDE AND LIDOCAINE HYDROCHLORIDE AND ALUMINUM HYDROXIDE AND MAGNESIUM HYDRO 15 MILLILITER(S): KIT at 17:45

## 2018-09-26 RX ADMIN — Medication 1 APPLICATION(S): at 17:45

## 2018-09-26 RX ADMIN — MAGNESIUM OXIDE 400 MG ORAL TABLET 400 MILLIGRAM(S): 241.3 TABLET ORAL at 10:31

## 2018-09-26 RX ADMIN — LORATADINE 10 MILLIGRAM(S): 10 TABLET ORAL at 13:17

## 2018-09-26 RX ADMIN — MAGNESIUM OXIDE 400 MG ORAL TABLET 400 MILLIGRAM(S): 241.3 TABLET ORAL at 13:15

## 2018-09-26 RX ADMIN — MAGNESIUM OXIDE 400 MG ORAL TABLET 400 MILLIGRAM(S): 241.3 TABLET ORAL at 17:45

## 2018-09-26 RX ADMIN — Medication 650 MILLIGRAM(S): at 22:31

## 2018-09-26 RX ADMIN — PIPERACILLIN AND TAZOBACTAM 25 GRAM(S): 4; .5 INJECTION, POWDER, LYOPHILIZED, FOR SOLUTION INTRAVENOUS at 05:07

## 2018-09-26 RX ADMIN — Medication 650 MILLIGRAM(S): at 23:00

## 2018-09-26 RX ADMIN — Medication 250 MILLIGRAM(S): at 18:16

## 2018-09-26 RX ADMIN — Medication 1 PACKET(S): at 10:32

## 2018-09-26 RX ADMIN — PIPERACILLIN AND TAZOBACTAM 25 GRAM(S): 4; .5 INJECTION, POWDER, LYOPHILIZED, FOR SOLUTION INTRAVENOUS at 22:21

## 2018-09-26 RX ADMIN — DIPHENHYDRAMINE HYDROCHLORIDE AND LIDOCAINE HYDROCHLORIDE AND ALUMINUM HYDROXIDE AND MAGNESIUM HYDRO 15 MILLILITER(S): KIT at 05:08

## 2018-09-26 RX ADMIN — PIPERACILLIN AND TAZOBACTAM 25 GRAM(S): 4; .5 INJECTION, POWDER, LYOPHILIZED, FOR SOLUTION INTRAVENOUS at 13:14

## 2018-09-26 RX ADMIN — ENOXAPARIN SODIUM 140 MILLIGRAM(S): 100 INJECTION SUBCUTANEOUS at 13:16

## 2018-09-26 RX ADMIN — SODIUM CHLORIDE 50 MILLILITER(S): 9 INJECTION INTRAMUSCULAR; INTRAVENOUS; SUBCUTANEOUS at 22:21

## 2018-09-26 RX ADMIN — Medication 1 APPLICATION(S): at 05:08

## 2018-09-26 RX ADMIN — Medication 1 PACKET(S): at 13:15

## 2018-09-26 RX ADMIN — SODIUM CHLORIDE 50 MILLILITER(S): 9 INJECTION INTRAMUSCULAR; INTRAVENOUS; SUBCUTANEOUS at 05:07

## 2018-09-26 NOTE — PROGRESS NOTE ADULT - PROBLEM SELECTOR PLAN 1
tachypneic, febrile, tachycardic, s/p paracentesis with SAAG 1.4, now with GPR in BCx3  -s/p Vanc/Cefepime and 4.7LNS in ED  -on Zosyn for anaerobic coverage, will add ampicillin for Listeria coverage  -CXR showing atelectasis bilaterally  -lactate has been clearing with fluids, on fluids 50 cc/hr  -CT chest with no obvious consolidation, vanc was d/c'd    -f/u blood and urine cx tachypneic, febrile, tachycardic, s/p paracentesis with SAAG 1.4, now with GPR in BCx3  -s/p Vanc/Cefepime and 4.7LNS in ED  -on Zosyn for broad spectrum and anaerobic coverage  -CXR showing atelectasis bilaterally  -lactate has been clearing with fluids, on fluids 50 cc/hr  -CT chest with no obvious consolidation, vanc was d/c'd    -f/u blood and urine cx

## 2018-09-26 NOTE — PROGRESS NOTE ADULT - PROBLEM SELECTOR PLAN 9
Neither pt nor wife are clear about medications pt is taking. Medication list that was reported does not match medication list in system, and does not match medications sent to pharmacy.  -ask wife to bring in medications to verify which he is taking VTE: SCDs in setting of thrombocytopenia  Diet: regular (switch to DASH once BP improves)

## 2018-09-26 NOTE — PROGRESS NOTE ADULT - PROBLEM SELECTOR PLAN 3
tp w/ chronic ascites 2/2 malignancy. was therapeutically tapped on 9/20  - s/p diag para yesterday by IR, negative for SBP with SAAG 1.4 and total cell count 31  -c/w abx as above tp w/ chronic ascites 2/2 malignancy. was therapeutically tapped on 9/20  - s/p diag para yesterday by IR, negative for SBP with SAAG 1.4 and total cell count 31  - SAAG c/w elevated gradient therefore ascites is more likely from portal hypertension.   -c/w abx as above

## 2018-09-26 NOTE — PROGRESS NOTE ADULT - ASSESSMENT
Patient is a 69 y/o M with a PMH significant for metastatic colon cancer on chemotherapy who presents with sepsis found to have gram positive don bacteremia

## 2018-09-26 NOTE — PROGRESS NOTE ADULT - PROBLEM SELECTOR PLAN 1
- US abdomen findings concerning for portal vein thrombosis  - c/w therapeutic lovenox  - LFTs improved

## 2018-09-26 NOTE — PROGRESS NOTE ADULT - PROBLEM SELECTOR PLAN 4
- c/w broad spectrum antibiotics as you are  - management per ID, await final speciation  - may need to have port removed

## 2018-09-26 NOTE — PROGRESS NOTE ADULT - PROBLEM SELECTOR PLAN 5
Shock liver vs mets to liver vs [place] [facility]  -CT A/P showing worsening hepatic metastatic disease   -RUQ US showing PVT, started lovenox 140mg daily

## 2018-09-26 NOTE — PROGRESS NOTE ADULT - PROBLEM SELECTOR PLAN 2
concerning changes on CXR. ct chest showing b/l small pleural effusions and atelectasis   -d/c'd vanc concerning changes on CXR. ct chest showing b/l small pleural effusions and atelectasis  pneumonia was ruled out on by CT chest.

## 2018-09-26 NOTE — CONSULT NOTE ADULT - ASSESSMENT
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 Peritoneal fluid results not consistent w SBP.    RVP neg  UA neg  CT C/A/P- peritoneal carcinomatosis, liver mets, pulmonary mets  BCx (9/24) (6/6 bottles including one set from medi-port)- GPR      FULL NOTE TO FOLLOW 68M metastatic colon ca to liver/lung (currently on chemo, last was 2 weeks ago), HTN, chronic ascitics sent in by oncologist for fever 103F, found to have high grade GPR bacteremia, possible 2/2 mediport, a/w new R portal and hepatic vein thrombosis. Peritoneal fluid results not consistent w SBP.    RVP neg  UA neg  CT C/A/P- peritoneal carcinomatosis, liver mets, pulmonary mets  BCx (9/24) (6/6 bottles including one set from medi-port)- GPR    - restart vancomycin  - c/w zosyn  - follow up BCx  - repeat BCx q48h until clear  - pt looks clinically well and is hemodynamically stable; will hold off on removing mediport for now, if bacteremia persists will need to re-evaluate    d/w Dr. Reyes

## 2018-09-26 NOTE — PROGRESS NOTE ADULT - SUBJECTIVE AND OBJECTIVE BOX
Patient is a 68y old  Male who presents with a chief complaint of sepsis (25 Sep 2018 13:56)    INTERVAL HPI/OVERNIGHT EVENTS: Patient had paracentesis done yesterday (SAAG 1.4). BCx grew GPRx3.     SUBJECTIVE: Patient seen and examined at bedside.     CONSTITUTIONAL: No weakness, fevers or chills  EYES/ENT: No visual changes;  No vertigo or throat pain   NECK: No pain or stiffness  RESPIRATORY: No cough, wheezing, hemoptysis; No shortness of breath  CARDIOVASCULAR: No chest pain or palpitations  GASTROINTESTINAL: No abdominal or epigastric pain. No nausea, vomiting, or hematemesis; No diarrhea or constipation. No melena or hematochezia.  GENITOURINARY: No dysuria, frequency or hematuria  NEUROLOGICAL: No numbness or weakness  SKIN: No itching, rashes    OBJECTIVE:    VITAL SIGNS:  ICU Vital Signs Last 24 Hrs  T(C): 36.3 (26 Sep 2018 06:20), Max: 37.1 (25 Sep 2018 15:19)  T(F): 97.3 (26 Sep 2018 06:20), Max: 98.8 (25 Sep 2018 15:19)  HR: 85 (26 Sep 2018 06:20) (85 - 99)  BP: 104/65 (26 Sep 2018 06:20) (102/64 - 116/72)  RR: 18 (26 Sep 2018 06:20) (18 - 20)  SpO2: 93% (26 Sep 2018 06:20) (93% - 99%)         @ 07:01  -   @ 07:00  --------------------------------------------------------  IN: 940 mL / OUT: 800 mL / NET: 140 mL      CAPILLARY BLOOD GLUCOSE      POCT Blood Glucose.: 141 mg/dL (24 Sep 2018 12:23)      PHYSICAL EXAM:    General: NAD  HEENT: NC/AT; PERRL, clear conjunctiva  Neck: supple  Respiratory: CTA b/l  Cardiovascular: +S1/S2; RRR  Abdomen: distended  Extremities: WWP, 2+ peripheral pulses b/l; no LE edema  Skin: normal color and turgor; no rash  Neurological: AAOx3    MEDICATIONS:  MEDICATIONS  (STANDING):  enoxaparin Injectable 140 milliGRAM(s) SubCutaneous daily  FIRST- Mouthwash  BLM 15 milliLiter(s) Swish and Spit every 6 hours  hydrocortisone 0.5% Cream 1 Application(s) Topical two times a day  influenza   Vaccine 0.5 milliLiter(s) IntraMuscular once  loratadine 10 milliGRAM(s) Oral daily  piperacillin/tazobactam IVPB. 3.375 Gram(s) IV Intermittent every 8 hours  sodium chloride 0.9%. 1000 milliLiter(s) (50 mL/Hr) IV Continuous <Continuous>    MEDICATIONS  (PRN):  acetaminophen   Tablet .. 650 milliGRAM(s) Oral every 6 hours PRN Temp greater or equal to 38C (100.4F), Mild Pain (1 - 3), Moderate Pain (4 - 6)  simethicone 80 milliGRAM(s) Chew two times a day PRN Gas  sodium chloride 0.65% Nasal 1 Spray(s) Both Nostrils once PRN Nasal Congestion      ALLERGIES:  Allergies    sulfa drugs (Unknown)    Intolerances        LABS:                        9.7    1.4   )-----------( 61       ( 25 Sep 2018 07:18 )             29.5         132<L>  |  103  |  51<H>  ----------------------------<  128<H>  4.6   |  18<L>  |  1.37<H>    Ca    7.8<L>      25 Sep 2018 07:18  Phos  3.8       Mg     1.7         TPro  4.6<L>  /  Alb  1.9<L>  /  TBili  0.7  /  DBili  x   /  AST  550<H>  /  ALT  180<H>  /  AlkPhos  156<H>      PT/INR - ( 26 Sep 2018 06:46 )   PT: 15.8 sec;   INR: 1.45 ratio         PTT - ( 26 Sep 2018 06:46 )  PTT:90.0 sec  Urinalysis Basic - ( 25 Sep 2018 08:40 )    Color: Yellow / Appearance: Clear / S.022 / pH: x  Gluc: x / Ketone: Negative  / Bili: Negative / Urobili: Negative   Blood: x / Protein: Trace / Nitrite: Negative   Leuk Esterase: Negative / RBC: 9 /hpf / WBC 1 /hpf   Sq Epi: x / Non Sq Epi: 1 /hpf / Bacteria: Negative        RADIOLOGY & ADDITIONAL TESTS: Reviewed.

## 2018-09-26 NOTE — PROGRESS NOTE ADULT - PROBLEM SELECTOR PLAN 6
Appears to be chronic w/ chronic schistocytosis.   -fibrinogen normal, unlikely in DIC    -SCDs for VTE until thrombocytopenia improves

## 2018-09-26 NOTE — PROGRESS NOTE ADULT - ATTENDING COMMENTS
as above  Blood culture growing GPR.  Will add vancomycin in addition to the Zosyn. as above  Blood culture growing GPR.  Will add vancomycin in addition to the Zosyn.  ID fernandez appreciated, will hold off on Mediport removal at this time.

## 2018-09-26 NOTE — PROGRESS NOTE ADULT - PROBLEM SELECTOR PLAN 7
Pt's last chemo was 2 weeks ago, and due for chemo soon.   -f/u Onc c/s: started zarxio for neutropenia

## 2018-09-26 NOTE — PROGRESS NOTE ADULT - ASSESSMENT
68M pmh metastatic colon ca (currently on chemo, last was 2 weeks ago), HTN admitted for sepsis, now found to be bacteremic with GPR.

## 2018-09-26 NOTE — CONSULT NOTE ADULT - SUBJECTIVE AND OBJECTIVE BOX
HPI: 68M metastatic colon ca (currently on chemo, last was 2 weeks ago), HTN, chronic ascitics now presenting to the ED for weakness, worsening cough, fevers to 103 at home. Pt states last Friday he was feeling fatigued and weak. He had a fever at home T max 103F. He went to see Dr. Casey on Monday who sent him in to ED. Pt has had two recent therapeutic paracenteses on  and . He states he has a post-nasal drip which results in a sore throat and occasional cough. No myalgias. No chest pain. Has some abd discomfort, worse in last 3 weeks; no n/v, diarrhea, dysuria.     Pt thinks he has developed ulcers on his tongue. No odynophagia. Of note, admitted  to 18 w GIB and had colonic stent and R mediport placed at that time. States about a week ago he thought there might have been some TTP at port site. Currently on vectibex and irinotecan as chemotx; last dose per pt was 2 weeks ago.     In hospital, pt had a diagnostic paracentesis  which doesn't show evidence of SBP.    PAST MEDICAL & SURGICAL HISTORY:  Malignant neoplasm of colon, unspecified part of colon  HTN (hypertension)  History of tonsillectomy      Allergies    sulfa drugs (Unknown)    Intolerances        ANTIMICROBIALS:  piperacillin/tazobactam IVPB. 3.375 every 8 hours      OTHER MEDS:  acetaminophen   Tablet .. 650 milliGRAM(s) Oral every 6 hours PRN  enoxaparin Injectable 140 milliGRAM(s) SubCutaneous daily  FIRST- Mouthwash  BLM 15 milliLiter(s) Swish and Spit every 6 hours  hydrocortisone 0.5% Cream 1 Application(s) Topical two times a day  influenza   Vaccine 0.5 milliLiter(s) IntraMuscular once  loratadine 10 milliGRAM(s) Oral daily  magnesium oxide 400 milliGRAM(s) Oral three times a day with meals  potassium phosphate / sodium phosphate powder 1 Packet(s) Oral every 4 hours  simethicone 80 milliGRAM(s) Chew two times a day PRN  sodium chloride 0.65% Nasal 1 Spray(s) Both Nostrils once PRN  sodium chloride 0.9%. 1000 milliLiter(s) IV Continuous <Continuous>      SOCIAL HISTORY:  Marital Status:    Occupation:   Lives with: wife    Substance Use (street drugs): denies  Tobacco Usage:  ex-smoker; quit one year ago  Alcohol Usage: denies    FAMILY HISTORY:  Family history of breast cancer in mother (Mother)      ROS:  All other systems negative     Constitutional: fever, chills, no weight loss, no night sweats  Eye: no eye pain, no redness, no vision changes  ENT:  sore throat, post-nasal drip  Cardiovascular:  no chest pain, no palpitation  Respiratory:  no SOB  GI:   abd discomfort, no vomiting, no diarrhea  urinary: no dysuria, no hematuria, no flank pain  : no  discharge or bleeding  musculoskeletal:  no joint pain, no joint swelling  skin: no rash  neurology:  no headache, no seizure, no change in mental status  psych: no anxiety, no depression     Physical Exam:    General:    NAD, non toxic  Head: atraumatic, normocephalic, facial rash  Eyes: normal sclera and conjunctiva  ENT:   no oropharyngeal lesions, no LAD, neck supple, poor dentition, no obvious oral ulcers  Cardio:    regular S1,S2  Respiratory:   mildly decreased breath sounds in b/l bases, no wheezing  abd:   distended, non-ttp  :     no CVAT, no suprapubic tenderness, no mosley  Musculoskeletal : no joint swelling, no edema  Skin:   facial rash, mediport site non erythematous and non-TTP  vascular: normal pulses  Neurologic: no focal deficits  psych: normal affect, no suicidal ideation      Drug Dosing Weight  Height (cm): 177.8 (24 Sep 2018 18:20)  Weight (kg): 93.5 (24 Sep 2018 18:20)  BMI (kg/m2): 29.6 (24 Sep 2018 18:20)  BSA (m2): 2.11 (24 Sep 2018 18:20)    Vital Signs Last 24 Hrs  T(F): 97.3 (18 @ 10:25), Max: 99.5 (18 @ 12:12)    Vital Signs Last 24 Hrs  HR: 89 (18 @ 10:25) (85 - 94)  BP: 107/66 (18 @ 10:25) (102/64 - 116/72)  RR: 18 (18 @ 10:25)  SpO2: 94% (18 @ 10:25) (93% - 97%)  Wt(kg): --                          9.1    3.7   )-----------( 76       ( 26 Sep 2018 06:46 )             27.7           133<L>  |  104  |  38<H>  ----------------------------<  84  4.2   |  20<L>  |  1.15    Ca    7.5<L>      26 Sep 2018 06:42  Phos  2.3       Mg     1.5         TPro  4.2<L>  /  Alb  1.8<L>  /  TBili  0.9  /  DBili  x   /  AST  382<H>  /  ALT  149<H>  /  AlkPhos  212<H>        Urinalysis Basic - ( 25 Sep 2018 08:40 )    Color: Yellow / Appearance: Clear / S.022 / pH: x  Gluc: x / Ketone: Negative  / Bili: Negative / Urobili: Negative   Blood: x / Protein: Trace / Nitrite: Negative   Leuk Esterase: Negative / RBC: 9 /hpf / WBC 1 /hpf   Sq Epi: x / Non Sq Epi: 1 /hpf / Bacteria: Negative        MICROBIOLOGY:  v  Peritoneal Peritoneal Fluid  18 --  --    polymorphonuclear leukocytes seen  No organisms seen by cytocentrifuge      .Blood Blood-Peripheral  18   Growth in anaerobic bottle: Gram Positive Rods  Growth in aerobic bottle: Gram Positive Rods  --    Growth in anaerobic bottle: Gram Positive Rods  Growth in aerobic bottle: Gram Positive Rods      .Blood Port Device  18   Growth in anaerobic bottle: Gram Positive Rods  Growth in aerobic bottle: Gram Positive Rods  --    Growth in anaerobic bottle: Gram Positive Rods  Growth in aerobic bottle: Gram Positive Rods    Rapid RVP Result: NotDetec ( @ 13:35)    RADIOLOGY:    CT c/a/p- Since 2018, increased size of hepatic metastatic disease.    Moderate volume abdominopelvic ascites.    Peritoneal nodularity, concerning for peritoneal carcinomatosis.    Possible pulmonary metastases.    Small bilateral pleural effusions with associated atelectasis.    Abdominal u/s- Extensive hepatic metastatic disease.     Patent main portal vein and left portal vein with hepatopedal flow.   Patent left and middle hepatic veins.    The right portal vein and right hepatic vein are not visualized, limited   by marked heterogeneity of the right hepatic lobe secondary to metastatic   disease. 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     Contracted gallbladder. No biliary ductal dilatation. HPI: 68M metastatic colon ca ( status post  6 cycles of FOLFOX who received  chemo 2 weeks ago: Vectibix + Irinetecan), HTN, chronic ascitics now presenting to the ED for weakness, worsening cough, fevers to 103 at home. Pt states last Friday he was feeling fatigued and weak. He had a fever at home T max 103F. He went to see Dr. Casey on Monday who sent him in to ED. Pt has had two recent therapeutic paracenteses on  and . He states he has a post-nasal drip which results in a sore throat and occasional cough. No myalgias. No chest pain. Has some abd discomfort, worse in last 3 weeks; no n/v, diarrhea, dysuria.     Pt thinks he has developed ulcers on his tongue. No odynophagia. Of note, admitted  to 18 w GIB and had colonic stent and R mediport placed at that time. States about a week ago he thought there might have been some TTP at port site. Currently on vectibex and irinotecan as chemotx; last dose per pt was 2 weeks ago.     In hospital, pt had a diagnostic paracentesis  which doesn't show evidence of SBP.  At present, he is fatigued but non-toxic.. He was told to anticipate rash with recent cehmotherapy- he has developed facial eruption and is being treated with topical steroids.    PAST MEDICAL & SURGICAL HISTORY:  Malignant neoplasm of colon, unspecified part of colon -extensive metastatic disease  HTN (hypertension)  History of tonsillectomy    Allergies  sulfa drugs (Unknown)    ANTIMICROBIALS:  piperacillin/tazobactam IVPB. 3.375 every 8 hours    OTHER MEDS:  acetaminophen   Tablet .. 650 milliGRAM(s) Oral every 6 hours PRN  enoxaparin Injectable 140 milliGRAM(s) SubCutaneous daily  FIRST- Mouthwash  BLM 15 milliLiter(s) Swish and Spit every 6 hours  hydrocortisone 0.5% Cream 1 Application(s) Topical two times a day  influenza   Vaccine 0.5 milliLiter(s) IntraMuscular once  loratadine 10 milliGRAM(s) Oral daily  magnesium oxide 400 milliGRAM(s) Oral three times a day with meals  potassium phosphate / sodium phosphate powder 1 Packet(s) Oral every 4 hours  simethicone 80 milliGRAM(s) Chew two times a day PRN  sodium chloride 0.65% Nasal 1 Spray(s) Both Nostrils once PRN  sodium chloride 0.9%. 1000 milliLiter(s) IV Continuous <Continuous>    SOCIAL HISTORY:  Marital Status:    Occupation:  - proficient   Lives with: wife    Substance Use (street drugs): denies  Tobacco Usage:  ex-smoker; quit one year ago  Alcohol Usage: denies    FAMILY HISTORY:  Family history of breast cancer in mother (Mother)      ROS:  All other systems negative     Constitutional: fever, chills, no weight loss, no night sweats  Eye: no eye pain, no redness, no vision changes  ENT:  sore throat, post-nasal drip  Cardiovascular:  no chest pain, no palpitation  Respiratory:  no SOB  GI:   abd discomfort, no vomiting, no diarrhea  urinary: no dysuria, no hematuria, no flank pain  : no  discharge or bleeding  musculoskeletal:  no joint pain, no joint swelling  skin: no rash  neurology:  no headache, no seizure, no change in mental status  psych: no anxiety, no depression     Physical Exam:    General:    NAD, non toxic  Head: atraumatic, normocephalic, facial rash  Eyes: normal sclera and conjunctiva  ENT:   no oropharyngeal lesions, no LAD, neck supple, poor dentition, no obvious oral ulcers  Cardio:    regular S1,S2  Respiratory:   mildly decreased breath sounds in b/l bases, no wheezing  abd:   distended, non-ttp  :     no CVAT, no suprapubic tenderness, no mosley  Musculoskeletal : no joint swelling, no edema  Skin:   facial rash, mediport site non erythematous and non-TTP  vascular: normal pulses  Neurologic: no focal deficits  psych: normal affect, no suicidal ideation      Drug Dosing Weight  Height (cm): 177.8 (24 Sep 2018 18:20)  Weight (kg): 93.5 (24 Sep 2018 18:20)  BMI (kg/m2): 29.6 (24 Sep 2018 18:20)  BSA (m2): 2.11 (24 Sep 2018 18:20)    Vital Signs Last 24 Hrs  T(F): 97.3 (18 @ 10:25), Max: 99.5 (18 @ 12:12)    Vital Signs Last 24 Hrs  HR: 89 (18 @ 10:25) (85 - 94)  BP: 107/66 (18 @ 10:25) (102/64 - 116/72)  RR: 18 (18 @ 10:25)  SpO2: 94% (18 @ 10:25) (93% - 97%)  Wt(kg): --                        9.1    3.7   )-----------( 76       ( 26 Sep 2018 06:46 )             27.7  WBC Count: 3.7 ( @ 06:46)  WBC Count: 1.4 ( @ 07:18)  WBC Count: 5.1 ( @ 12:35)        133<L>  |  104  |  38<H>  ----------------------------<  84  4.2   |  20<L>  |  1.15  Creatinine: 1.15 ( @ 06:42)    Creatinine: 1.37 ( @ 07:18)    Creatinine: 1.80 ( @ 12:35)   Ca    7.5<L>      26 Sep 2018 06:42  Phos  2.3       Mg     1.5         TPro  4.2<L>  /  Alb  1.8<L>  /  TBili  0.9  /  DBili  x   /  AST  382<H>  /  ALT  149<H>  /  AlkPhos  212<H>        Urinalysis Basic - ( 25 Sep 2018 08:40 )    Color: Yellow / Appearance: Clear / S.022 / pH: x  Gluc: x / Ketone: Negative  / Bili: Negative / Urobili: Negative   Blood: x / Protein: Trace / Nitrite: Negative   Leuk Esterase: Negative / RBC: 9 /hpf / WBC 1 /hpf   Sq Epi: x / Non Sq Epi: 1 /hpf / Bacteria: Negative        MICROBIOLOGY:  Peritoneal Peritoneal Fluid  18 --  --    polymorphonuclear leukocytes seen  No organisms seen by cytocentrifuge      .Blood Blood-Peripheral  18   Growth in anaerobic bottle: Gram Positive Rods  Growth in aerobic bottle: Gram Positive Rods  --    Growth in anaerobic bottle: Gram Positive Rods  Growth in aerobic bottle: Gram Positive Rods      .Blood Port Device  18   Growth in anaerobic bottle: Gram Positive Rods  Growth in aerobic bottle: Gram Positive Rods  --    Growth in anaerobic bottle: Gram Positive Rods  Growth in aerobic bottle: Gram Positive Rods    Rapid RVP Result: NotDetec ( @ 13:35)    RADIOLOGY:  CT c/a/p- Since 2018, increased size of hepatic metastatic disease.    Moderate volume abdominopelvic ascites.    Peritoneal nodularity, concerning for peritoneal carcinomatosis.    Possible pulmonary metastases.    Small bilateral pleural effusions with associated atelectasis.    Abdominal u/s- Extensive hepatic metastatic disease.     Patent main portal vein and left portal vein with hepatopedal flow.   Patent left and middle hepatic veins.    The right portal vein and right hepatic vein are not visualized, limited   by marked heterogeneity of the right hepatic lobe secondary to metastatic   disease. 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     Contracted gallbladder. No biliary ductal dilatation.

## 2018-09-26 NOTE — PROGRESS NOTE ADULT - PROBLEM SELECTOR PLAN 4
likely in setting of dehydration and shock  -continue with fluids  -check Cr daily  -avoid nephrotoxic agents ORSEMARY with presumed ATN in the setting of sepsis  - ROSEMARY now resolved.   -continue with fluids  -check Cr daily  -avoid nephrotoxic agents

## 2018-09-26 NOTE — PROGRESS NOTE ADULT - SUBJECTIVE AND OBJECTIVE BOX
INTERVAL HPI/OVERNIGHT EVENTS:  Blood cultures returned with gram positive rods. Port cx also possible. ID consulted.    Patient developed facial rash. He is receiving hydrocortisone cream.    VITAL SIGNS:  T(F): 97.5 (18 @ 14:25)  HR: 84 (18 @ 14:25)  BP: 112/71 (18 @ 14:25)  RR: 18 (18 @ 14:25)  SpO2: 95% (18 @ 14:25)  Wt(kg): --    PHYSICAL EXAM:    Constitutional: NAD, chronically ill  Eyes: EOMI, sclera non-icteric  Neck: supple, no masses, no JVD  Respiratory: CTA b/l, good air entry b/l  Cardiovascular: RRR, no M/R/G  Gastrointestinal: soft, protuberant  Extremities: no c/c/e  Neurological: AAOx3      MEDICATIONS  (STANDING):  enoxaparin Injectable 140 milliGRAM(s) SubCutaneous daily  FIRST- Mouthwash  BLM 15 milliLiter(s) Swish and Spit every 6 hours  hydrocortisone 2.5% Cream 1 Application(s) Topical two times a day  influenza   Vaccine 0.5 milliLiter(s) IntraMuscular once  loratadine 10 milliGRAM(s) Oral daily  magnesium oxide 400 milliGRAM(s) Oral three times a day with meals  piperacillin/tazobactam IVPB. 3.375 Gram(s) IV Intermittent every 8 hours  sodium chloride 0.9%. 1000 milliLiter(s) (50 mL/Hr) IV Continuous <Continuous>  vancomycin  IVPB 750 milliGRAM(s) IV Intermittent once  vancomycin  IVPB        MEDICATIONS  (PRN):  acetaminophen   Tablet .. 650 milliGRAM(s) Oral every 6 hours PRN Temp greater or equal to 38C (100.4F), Mild Pain (1 - 3), Moderate Pain (4 - 6)  simethicone 80 milliGRAM(s) Chew two times a day PRN Gas  sodium chloride 0.65% Nasal 1 Spray(s) Both Nostrils once PRN Nasal Congestion      Allergies    sulfa drugs (Unknown)    Intolerances        LABS:                        9.1    3.7   )-----------( 76       ( 26 Sep 2018 06:46 )             27.7         133<L>  |  104  |  38<H>  ----------------------------<  84  4.2   |  20<L>  |  1.15    Ca    7.5<L>      26 Sep 2018 06:42  Phos  2.3       Mg     1.5         TPro  4.2<L>  /  Alb  1.8<L>  /  TBili  0.9  /  DBili  x   /  AST  382<H>  /  ALT  149<H>  /  AlkPhos  212<H>      PT/INR - ( 26 Sep 2018 06:46 )   PT: 15.8 sec;   INR: 1.45 ratio         PTT - ( 26 Sep 2018 06:46 )  PTT:90.0 sec  Urinalysis Basic - ( 25 Sep 2018 08:40 )    Color: Yellow / Appearance: Clear / S.022 / pH: x  Gluc: x / Ketone: Negative  / Bili: Negative / Urobili: Negative   Blood: x / Protein: Trace / Nitrite: Negative   Leuk Esterase: Negative / RBC: 9 /hpf / WBC 1 /hpf   Sq Epi: x / Non Sq Epi: 1 /hpf / Bacteria: Negative        RADIOLOGY & ADDITIONAL TESTS:  Studies reviewed.    ASSESSMENT & PLAN:

## 2018-09-27 ENCOUNTER — APPOINTMENT (OUTPATIENT)
Dept: INFUSION THERAPY | Facility: HOSPITAL | Age: 68
End: 2018-09-27

## 2018-09-27 DIAGNOSIS — R30.9 PAINFUL MICTURITION, UNSPECIFIED: ICD-10-CM

## 2018-09-27 LAB
ALBUMIN SERPL ELPH-MCNC: 1.8 G/DL — LOW (ref 3.3–5)
ALP SERPL-CCNC: 276 U/L — HIGH (ref 40–120)
ALT FLD-CCNC: 129 U/L — HIGH (ref 10–45)
ANION GAP SERPL CALC-SCNC: 8 MMOL/L — SIGNIFICANT CHANGE UP (ref 5–17)
AST SERPL-CCNC: 292 U/L — HIGH (ref 10–40)
BASOPHILS # BLD AUTO: 0 K/UL — SIGNIFICANT CHANGE UP (ref 0–0.2)
BILIRUB SERPL-MCNC: 1.1 MG/DL — SIGNIFICANT CHANGE UP (ref 0.2–1.2)
BUN SERPL-MCNC: 32 MG/DL — HIGH (ref 7–23)
CALCIUM SERPL-MCNC: 7.5 MG/DL — LOW (ref 8.4–10.5)
CHLORIDE SERPL-SCNC: 105 MMOL/L — SIGNIFICANT CHANGE UP (ref 96–108)
CO2 SERPL-SCNC: 21 MMOL/L — LOW (ref 22–31)
CREAT SERPL-MCNC: 1.1 MG/DL — SIGNIFICANT CHANGE UP (ref 0.5–1.3)
EOSINOPHIL # BLD AUTO: 0.1 K/UL — SIGNIFICANT CHANGE UP (ref 0–0.5)
EOSINOPHIL NFR BLD AUTO: 1 % — SIGNIFICANT CHANGE UP (ref 0–6)
GLUCOSE SERPL-MCNC: 93 MG/DL — SIGNIFICANT CHANGE UP (ref 70–99)
HCT VFR BLD CALC: 29.7 % — LOW (ref 39–50)
HGB BLD-MCNC: 9.2 G/DL — LOW (ref 13–17)
LYMPHOCYTES # BLD AUTO: 0.7 K/UL — LOW (ref 1–3.3)
LYMPHOCYTES # BLD AUTO: 18 % — SIGNIFICANT CHANGE UP (ref 13–44)
MAGNESIUM SERPL-MCNC: 1.6 MG/DL — SIGNIFICANT CHANGE UP (ref 1.6–2.6)
MCHC RBC-ENTMCNC: 28 PG — SIGNIFICANT CHANGE UP (ref 27–34)
MCHC RBC-ENTMCNC: 30.9 GM/DL — LOW (ref 32–36)
MCV RBC AUTO: 90.6 FL — SIGNIFICANT CHANGE UP (ref 80–100)
MONOCYTES # BLD AUTO: 1.2 K/UL — HIGH (ref 0–0.9)
MONOCYTES NFR BLD AUTO: 23 % — HIGH (ref 2–14)
NEUTROPHILS # BLD AUTO: 3.7 K/UL — SIGNIFICANT CHANGE UP (ref 1.8–7.4)
NEUTROPHILS NFR BLD AUTO: 54 % — SIGNIFICANT CHANGE UP (ref 43–77)
PHOSPHATE SERPL-MCNC: 2.2 MG/DL — LOW (ref 2.5–4.5)
PLATELET # BLD AUTO: 92 K/UL — LOW (ref 150–400)
POTASSIUM SERPL-MCNC: 4.3 MMOL/L — SIGNIFICANT CHANGE UP (ref 3.5–5.3)
POTASSIUM SERPL-SCNC: 4.3 MMOL/L — SIGNIFICANT CHANGE UP (ref 3.5–5.3)
PROT SERPL-MCNC: 4.4 G/DL — LOW (ref 6–8.3)
RBC # BLD: 3.28 M/UL — LOW (ref 4.2–5.8)
RBC # FLD: 20.3 % — HIGH (ref 10.3–14.5)
SODIUM SERPL-SCNC: 134 MMOL/L — LOW (ref 135–145)
WBC # BLD: 5.4 K/UL — SIGNIFICANT CHANGE UP (ref 3.8–10.5)
WBC # FLD AUTO: 5.4 K/UL — SIGNIFICANT CHANGE UP (ref 3.8–10.5)

## 2018-09-27 PROCEDURE — 99232 SBSQ HOSP IP/OBS MODERATE 35: CPT | Mod: GC

## 2018-09-27 PROCEDURE — 99233 SBSQ HOSP IP/OBS HIGH 50: CPT | Mod: GC

## 2018-09-27 RX ORDER — DOXAZOSIN MESYLATE 4 MG
4 TABLET ORAL AT BEDTIME
Qty: 0 | Refills: 0 | Status: DISCONTINUED | OUTPATIENT
Start: 2018-09-27 | End: 2018-10-02

## 2018-09-27 RX ORDER — OMEPRAZOLE 10 MG/1
1 CAPSULE, DELAYED RELEASE ORAL
Qty: 0 | Refills: 0 | COMMUNITY

## 2018-09-27 RX ORDER — METOPROLOL TARTRATE 50 MG
1 TABLET ORAL
Qty: 0 | Refills: 0 | COMMUNITY

## 2018-09-27 RX ORDER — ONDANSETRON 8 MG/1
1 TABLET, FILM COATED ORAL
Qty: 0 | Refills: 0 | COMMUNITY

## 2018-09-27 RX ORDER — LORATADINE 10 MG/1
1 TABLET ORAL
Qty: 0 | Refills: 0 | COMMUNITY

## 2018-09-27 RX ORDER — DOXAZOSIN MESYLATE 4 MG
1 TABLET ORAL
Qty: 0 | Refills: 0 | COMMUNITY

## 2018-09-27 RX ORDER — BENZOCAINE AND MENTHOL 5; 1 G/100ML; G/100ML
1 LIQUID ORAL EVERY 4 HOURS
Qty: 0 | Refills: 0 | Status: DISCONTINUED | OUTPATIENT
Start: 2018-09-27 | End: 2018-10-02

## 2018-09-27 RX ORDER — GENTAMICIN SULFATE 40 MG/ML
90 VIAL (ML) INJECTION EVERY 8 HOURS
Qty: 0 | Refills: 0 | Status: DISCONTINUED | OUTPATIENT
Start: 2018-09-27 | End: 2018-10-01

## 2018-09-27 RX ORDER — AMPICILLIN TRIHYDRATE 250 MG
2 CAPSULE ORAL EVERY 4 HOURS
Qty: 0 | Refills: 0 | Status: DISCONTINUED | OUTPATIENT
Start: 2018-09-27 | End: 2018-10-01

## 2018-09-27 RX ORDER — MENTHOL AND METHYL SALICYLATE 10; 30 G/100G; G/100G
1 STICK TOPICAL
Qty: 0 | Refills: 0 | Status: DISCONTINUED | OUTPATIENT
Start: 2018-09-27 | End: 2018-10-02

## 2018-09-27 RX ORDER — SIMETHICONE 80 MG/1
80 TABLET, CHEWABLE ORAL EVERY 4 HOURS
Qty: 0 | Refills: 0 | Status: DISCONTINUED | OUTPATIENT
Start: 2018-09-27 | End: 2018-10-02

## 2018-09-27 RX ORDER — TIOTROPIUM BROMIDE AND OLODATEROL 3.124; 2.736 UG/1; UG/1
2 SPRAY, METERED RESPIRATORY (INHALATION)
Qty: 0 | Refills: 0 | COMMUNITY

## 2018-09-27 RX ADMIN — DIPHENHYDRAMINE HYDROCHLORIDE AND LIDOCAINE HYDROCHLORIDE AND ALUMINUM HYDROXIDE AND MAGNESIUM HYDRO 15 MILLILITER(S): KIT at 18:33

## 2018-09-27 RX ADMIN — SODIUM CHLORIDE 50 MILLILITER(S): 9 INJECTION INTRAMUSCULAR; INTRAVENOUS; SUBCUTANEOUS at 15:57

## 2018-09-27 RX ADMIN — Medication 1 APPLICATION(S): at 05:36

## 2018-09-27 RX ADMIN — Medication 650 MILLIGRAM(S): at 15:54

## 2018-09-27 RX ADMIN — Medication 1 APPLICATION(S): at 18:32

## 2018-09-27 RX ADMIN — PIPERACILLIN AND TAZOBACTAM 25 GRAM(S): 4; .5 INJECTION, POWDER, LYOPHILIZED, FOR SOLUTION INTRAVENOUS at 05:32

## 2018-09-27 RX ADMIN — Medication 4 MILLIGRAM(S): at 21:57

## 2018-09-27 RX ADMIN — Medication 650 MILLIGRAM(S): at 14:48

## 2018-09-27 RX ADMIN — Medication 250 MILLIGRAM(S): at 05:32

## 2018-09-27 RX ADMIN — BENZOCAINE AND MENTHOL 1 LOZENGE: 5; 1 LIQUID ORAL at 23:14

## 2018-09-27 RX ADMIN — BENZOCAINE AND MENTHOL 1 LOZENGE: 5; 1 LIQUID ORAL at 17:36

## 2018-09-27 RX ADMIN — Medication 100 MILLIGRAM(S): at 21:56

## 2018-09-27 RX ADMIN — DIPHENHYDRAMINE HYDROCHLORIDE AND LIDOCAINE HYDROCHLORIDE AND ALUMINUM HYDROXIDE AND MAGNESIUM HYDRO 15 MILLILITER(S): KIT at 05:35

## 2018-09-27 RX ADMIN — Medication 216 GRAM(S): at 21:56

## 2018-09-27 RX ADMIN — DIPHENHYDRAMINE HYDROCHLORIDE AND LIDOCAINE HYDROCHLORIDE AND ALUMINUM HYDROXIDE AND MAGNESIUM HYDRO 15 MILLILITER(S): KIT at 11:34

## 2018-09-27 RX ADMIN — ENOXAPARIN SODIUM 140 MILLIGRAM(S): 100 INJECTION SUBCUTANEOUS at 14:44

## 2018-09-27 RX ADMIN — Medication 100 MILLIGRAM(S): at 15:56

## 2018-09-27 RX ADMIN — Medication 216 GRAM(S): at 18:32

## 2018-09-27 RX ADMIN — Medication 216 GRAM(S): at 14:44

## 2018-09-27 RX ADMIN — LORATADINE 10 MILLIGRAM(S): 10 TABLET ORAL at 11:33

## 2018-09-27 RX ADMIN — DIPHENHYDRAMINE HYDROCHLORIDE AND LIDOCAINE HYDROCHLORIDE AND ALUMINUM HYDROXIDE AND MAGNESIUM HYDRO 15 MILLILITER(S): KIT at 00:49

## 2018-09-27 NOTE — PHYSICAL THERAPY INITIAL EVALUATION ADULT - PERTINENT HX OF CURRENT PROBLEM, REHAB EVAL
68M pmh metastatic colon ca (currently on chemo, last was 2 weeks ago), HTN admitted for sepsis, now found to be bacteremic with GPR.  9/25 Chest x-ray b/l atelectasis.

## 2018-09-27 NOTE — PROGRESS NOTE ADULT - PROBLEM SELECTOR PLAN 1
tachypneic, febrile, tachycardic, s/p paracentesis with SAAG 1.4, now with GPR in BCx3 possibly from mediport  -s/p Vanc/Cefepime and 4.7LNS in ED  -on Zosyn for broad spectrum and anaerobic coverage  -CXR showing atelectasis bilaterally  -lactate has been clearing with fluids, on fluids 50 cc/hr  -CT chest with no obvious consolidation, vanc was d/c'd    -blood cx q48h, a set was sent this AM With listeria in BCx   - Switched to amp and gent (for synergy)  - As per ID, do not need to remove mediport

## 2018-09-27 NOTE — PROGRESS NOTE ADULT - PROBLEM SELECTOR PLAN 2
With transaminitis  -RUQ US showing PVT, on lovenox 140mg daily on admission was tachypneic, febrile, tachycardic, s/p diag paracentesis negative for SBP, now BCx with listeria   - switched from vanc/zosyn to ampicillin and gent (for synergy) for listeria  - blood cx q48h, a set was sent this AM

## 2018-09-27 NOTE — PROGRESS NOTE ADULT - PROBLEM SELECTOR PLAN 5
Appears to be chronic w/ chronic schistocytosis.   -fibrinogen normal, unlikely in DIC    -SCDs for VTE until thrombocytopenia improves Shock liver vs mets to liver vs [facility]  -CT A/P showing worsening hepatic metastatic disease   -RUQ US showing PVT, started lovenox 140mg daily

## 2018-09-27 NOTE — PROGRESS NOTE ADULT - SUBJECTIVE AND OBJECTIVE BOX
Follow Up:  Overnight no events; afebrile overnight. Pt c/o of numbness in b/l feet that he has had for a year, worse recently. No headache, photophobia, neck pain. Eating/drinking well. Still w some GI upset/bloating, no diarrhea. Has been eating a lot of cheeses recently (pasteurized); not eating deli meats.    Interval History:    ROS:    All other systems negative    Constitutional: no fever, no chills  Head: no trauma  Eyes: no vision changes, no eye pain  ENT:  no sore throat, no rhinorrhea  Cardiovascular:  no chest pain, no palpitation  Respiratory:  no SOB, no cough  GI:  abd bloating, no vomiting, no diarrhea  urinary: no dysuria, no hematuria, no flank pain  musculoskeletal:  no joint pain, no joint swelling  skin:  no rash  neurology:  no headache, no seizure, no change in mental status; b/l feet tingling  psych: no anxiety, no depression         Allergies  sulfa drugs (Unknown)      ANTIMICROBIALS:  ampicillin  IVPB 2 every 4 hours  gentamicin   IVPB 90 every 8 hours      OTHER MEDS:  acetaminophen   Tablet .. 650 milliGRAM(s) Oral every 6 hours PRN  enoxaparin Injectable 140 milliGRAM(s) SubCutaneous daily  FIRST- Mouthwash  BLM 15 milliLiter(s) Swish and Spit every 6 hours  hydrocortisone 2.5% Cream 1 Application(s) Topical two times a day  influenza   Vaccine 0.5 milliLiter(s) IntraMuscular once  loratadine 10 milliGRAM(s) Oral daily  polyethylene glycol 3350 17 Gram(s) Oral two times a day PRN  simethicone 80 milliGRAM(s) Chew two times a day PRN  sodium chloride 0.65% Nasal 1 Spray(s) Both Nostrils once PRN  sodium chloride 0.9%. 1000 milliLiter(s) IV Continuous <Continuous>      Vital Signs Last 24 Hrs  T(C): 36.2 (27 Sep 2018 06:32), Max: 36.7 (26 Sep 2018 20:06)  T(F): 97.2 (27 Sep 2018 06:32), Max: 98.1 (26 Sep 2018 20:06)  HR: 87 (27 Sep 2018 06:32) (84 - 99)  BP: 110/70 (27 Sep 2018 06:32) (101/63 - 112/71)  BP(mean): --  RR: 18 (27 Sep 2018 06:32) (18 - 20)  SpO2: 95% (27 Sep 2018 06:32) (93% - 96%)    Physical Exam:  General:    NAD, non toxic  Head: atraumatic, normocephalic, facial rash  Eyes: normal sclera and conjunctiva  ENT:   no oropharyngeal lesions, no LAD, neck supple, poor dentition, no obvious oral ulcers  Cardio:    regular S1,S2  Respiratory:   mildly decreased breath sounds in b/l bases, no wheezing  abd:   distended, non-ttp  :     no CVAT, no suprapubic tenderness, no mosley  Musculoskeletal : no joint swelling, no edema  Skin:   facial rash, mediport site non erythematous and non-TTP  vascular: normal pulses  Neurologic: no focal deficits  psych: normal affect, no suicidal ideation                            9.2    5.4   )-----------( 92       ( 27 Sep 2018 06:14 )             29.7       09-27    134<L>  |  105  |  32<H>  ----------------------------<  93  4.3   |  21<L>  |  1.10    Ca    7.5<L>      27 Sep 2018 06:14  Phos  2.2     09-27  Mg     1.6     09-27    TPro  4.4<L>  /  Alb  1.8<L>  /  TBili  1.1  /  DBili  x   /  AST  292<H>  /  ALT  129<H>  /  AlkPhos  276<H>  09-27    MICROBIOLOGY:  v  Peritoneal Peritoneal Fluid  09-25-18   No growth  --    polymorphonuclear leukocytes seen  No organisms seen by cytocentrifuge      .Blood Blood-Peripheral  09-24-18   Growth in aerobic and anaerobic bottles: Listeria monocytogenes  Resistance to ampicillin or penicillin for Listeria  monocytogenes has not been described. L. monocytogenes is intrinsically  resistant to cephalosporins.  --    Growth in anaerobic bottle: Gram Positive Rods  Growth in aerobic bottle: Gram Positive Rods      .Blood Port Device  09-24-18   Growth in aerobic and anaerobic bottles: Listeria monocytogenes  Resistance to ampicillin or penicillin for Listeria  monocytogenes has not been described. L. monocytogenes is intrinsically  resistant to cephalosporins.  --    Growth in anaerobic bottle: Gram Positive Rods  Growth in aerobic bottle: Gram Positive Rods      .Blood Blood  09-09-18   No growth at 5 days.      Rapid RVP Result: NotDetec (09-24 @ 13:35)      RADIOLOGY:  CT c/a/p- Since September 25, 2018, increased size of hepatic metastatic disease.    Moderate volume abdominopelvic ascites.    Peritoneal nodularity, concerning for peritoneal carcinomatosis.    Possible pulmonary metastases.    Small bilateral pleural effusions with associated atelectasis.    Abdominal u/s- Extensive hepatic metastatic disease.     Patent main portal vein and left portal vein with hepatopedal flow.   Patent left and middle hepatic veins.    The right portal vein and right hepatic vein are not visualized, limited   by marked heterogeneity of the right hepatic lobe secondary to metastatic   disease. 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     Contracted gallbladder. No biliary ductal dilatation.

## 2018-09-27 NOTE — PROGRESS NOTE ADULT - PROBLEM SELECTOR PLAN 3
tp w/ chronic ascites 2/2 malignancy. was therapeutically tapped on 9/20  - s/p diag para yesterday by IR, negative for SBP with SAAG 1.4 and total cell count 31  - SAAG c/w elevated gradient therefore ascites is more likely from portal hypertension, negative for SBP likely 2/2 stopping doxazosin, patient denies history of BPH. Denies dysuria, UA was negative on admission  - restarted doxazosin

## 2018-09-27 NOTE — PHYSICAL THERAPY INITIAL EVALUATION ADULT - ADDITIONAL COMMENTS
Pt lives with his wife in a private house with 2 steps to neg. No HR. Pt amb independently PTA. No equipment at home.

## 2018-09-27 NOTE — PROGRESS NOTE ADULT - ASSESSMENT
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 Peritoneal fluid results not consistent w SBP.    RVP neg  UA neg  CT C/A/P- peritoneal carcinomatosis, liver mets, pulmonary mets  BCx (9/24) (6/6 bottles including one set from [facility])- listeria monocytogenes    - d/c vancomycin and d/c zosyn  - start ampicillin 2 g IV [id number] for listeria bacteremia  - start gentamicin 90 mg IV q8h (x3 days for synergistic effect)  - follow up BCx   - repeat BCx q48h until clear  - pt looks clinically well and is hemodynamically stable; listeria unlikely to form a biofilm; will hold off on removing mediport for now, if bacteremia persists will need to re-evaluate    d/w Dr. Reyes

## 2018-09-27 NOTE — PROGRESS NOTE ADULT - PROBLEM SELECTOR PLAN 7
will hold antihypertensives in setting of sepsis Pt's last chemo was 2 weeks ago, and due for chemo soon.   -f/u Onc c/s: started zarxio for neutropenia  -hydrocortisone cream for facial rash

## 2018-09-27 NOTE — PHYSICAL THERAPY INITIAL EVALUATION ADULT - BALANCE TRAINING, PT EVAL
GOAL: Patient will demonstrate an increase in static/dynamic balance in sitting/standing where deficient by at least 1 grade within 1 week to facilitate greater independence during functional mobility and ADL's.

## 2018-09-27 NOTE — PHYSICAL THERAPY INITIAL EVALUATION ADULT - CRITERIA FOR SKILLED THERAPEUTIC INTERVENTIONS
anticipated equipment needs at discharge/impairments found/risk reduction/prevention/anticipated discharge recommendation

## 2018-09-27 NOTE — PHYSICAL THERAPY INITIAL EVALUATION ADULT - BED MOBILITY TRAINING, PT EVAL
See treatment section for initial evaluation and plan of care  Malik Edwards, PT  7/25/2018     GOAL: Patient will perform bed mobility independently in 1 week

## 2018-09-27 NOTE — PROGRESS NOTE ADULT - PROBLEM SELECTOR PLAN 8
VTE: SCDs in setting of thrombocytopenia  Diet: regular (switch to DASH once BP improves) will hold antihypertensives in setting of sepsis  - can restart meds if indicated

## 2018-09-27 NOTE — PROGRESS NOTE ADULT - SUBJECTIVE AND OBJECTIVE BOX
Patient is a 68y old  Male who presents with a chief complaint of sepsis (26 Sep 2018 17:26)    INTERVAL HPI/OVERNIGHT EVENTS: Was started on vanc yesterday, will have pre-4th dose trough today.     SUBJECTIVE: Patient seen and examined at bedside.     CONSTITUTIONAL: No weakness, fevers or chills  EYES/ENT: No visual changes;  No vertigo or throat pain   NECK: No pain or stiffness  RESPIRATORY: No cough, wheezing, hemoptysis; No shortness of breath  CARDIOVASCULAR: No chest pain or palpitations  GASTROINTESTINAL: No abdominal or epigastric pain. No nausea, vomiting, or hematemesis; No diarrhea or constipation. No melena or hematochezia.  GENITOURINARY: No dysuria, frequency or hematuria  NEUROLOGICAL: No numbness or weakness  SKIN: No itching, rashes    OBJECTIVE:    VITAL SIGNS:  ICU Vital Signs Last 24 Hrs  T(C): 36.2 (27 Sep 2018 06:32), Max: 36.7 (26 Sep 2018 20:06)  T(F): 97.2 (27 Sep 2018 06:32), Max: 98.1 (26 Sep 2018 20:06)  HR: 87 (27 Sep 2018 06:32) (84 - 99)  BP: 110/70 (27 Sep 2018 06:32) (101/63 - 112/71)  RR: 18 (27 Sep 2018 06:32) (18 - 20)  SpO2: 95% (27 Sep 2018 06:32) (93% - 96%)         @ 07:01  -   @ 07:00  --------------------------------------------------------  IN: 2560 mL / OUT: 1150 mL / NET: 1410 mL      CAPILLARY BLOOD GLUCOSE          PHYSICAL EXAM:    General: NAD  HEENT: NC/AT; PERRL, clear conjunctiva. Rash on face with small pustules, especially around mouth  Neck: supple  Respiratory: CTA b/l  Cardiovascular: +S1/S2; RRR  Abdomen: soft, nontender, BS+. distended.   Extremities: WWP, 1+ peripheral pulses b/l; 2+ pitting edema of LE  Skin: normal color and turgor; no rash  Neurological: AAOx3    MEDICATIONS:  MEDICATIONS  (STANDING):  enoxaparin Injectable 140 milliGRAM(s) SubCutaneous daily  FIRST- Mouthwash  BLM 15 milliLiter(s) Swish and Spit every 6 hours  hydrocortisone 2.5% Cream 1 Application(s) Topical two times a day  influenza   Vaccine 0.5 milliLiter(s) IntraMuscular once  loratadine 10 milliGRAM(s) Oral daily  piperacillin/tazobactam IVPB. 3.375 Gram(s) IV Intermittent every 8 hours  sodium chloride 0.9%. 1000 milliLiter(s) (50 mL/Hr) IV Continuous <Continuous>  vancomycin  IVPB 1000 milliGRAM(s) IV Intermittent every 12 hours    MEDICATIONS  (PRN):  acetaminophen   Tablet .. 650 milliGRAM(s) Oral every 6 hours PRN Temp greater or equal to 38C (100.4F), Mild Pain (1 - 3), Moderate Pain (4 - 6)  polyethylene glycol 3350 17 Gram(s) Oral two times a day PRN Constipation  simethicone 80 milliGRAM(s) Chew two times a day PRN Gas  sodium chloride 0.65% Nasal 1 Spray(s) Both Nostrils once PRN Nasal Congestion      ALLERGIES:  Allergies    sulfa drugs (Unknown)    Intolerances        LABS:                        9.2    5.4   )-----------( 92       ( 27 Sep 2018 06:14 )             29.7         134<L>  |  105  |  32<H>  ----------------------------<  93  4.3   |  21<L>  |  1.10    Ca    7.5<L>      27 Sep 2018 06:14  Phos  2.2       Mg     1.6         TPro  4.4<L>  /  Alb  1.8<L>  /  TBili  1.1  /  DBili  x   /  AST  292<H>  /  ALT  129<H>  /  AlkPhos  276<H>      PT/INR - ( 26 Sep 2018 06:46 )   PT: 15.8 sec;   INR: 1.45 ratio         PTT - ( 26 Sep 2018 06:46 )  PTT:90.0 sec  Urinalysis Basic - ( 25 Sep 2018 08:40 )    Color: Yellow / Appearance: Clear / S.022 / pH: x  Gluc: x / Ketone: Negative  / Bili: Negative / Urobili: Negative   Blood: x / Protein: Trace / Nitrite: Negative   Leuk Esterase: Negative / RBC: 9 /hpf / WBC 1 /hpf   Sq Epi: x / Non Sq Epi: 1 /hpf / Bacteria: Negative        RADIOLOGY & ADDITIONAL TESTS: Reviewed. Patient is a 68y old  Male who presents with a chief complaint of sepsis (26 Sep 2018 17:26)    INTERVAL HPI/OVERNIGHT EVENTS: Was started on vanc yesterday, will have pre-4th dose trough today. Grew listeria in blood, will d/c vanc and zosyn. Switch to amp/gent.     SUBJECTIVE: Patient seen and examined at bedside. Complaining of pain/pressure with urination. Also complaining about nasal cannula, says it keeps falling off. Has some bleeding from facial rash/sores. Denies N/V/CP/SOB,       OBJECTIVE:    VITAL SIGNS:  ICU Vital Signs Last 24 Hrs  T(C): 36.2 (27 Sep 2018 06:32), Max: 36.7 (26 Sep 2018 20:06)  T(F): 97.2 (27 Sep 2018 06:32), Max: 98.1 (26 Sep 2018 20:06)  HR: 87 (27 Sep 2018 06:32) (84 - 99)  BP: 110/70 (27 Sep 2018 06:32) (101/63 - 112/71)  RR: 18 (27 Sep 2018 06:32) (18 - 20)  SpO2: 95% (27 Sep 2018 06:32) (93% - 96%)         @ 07:01  -   @ 07:00  --------------------------------------------------------  IN: 2560 mL / OUT: 1150 mL / NET: 1410 mL      CAPILLARY BLOOD GLUCOSE          PHYSICAL EXAM:    General: NAD  HEENT: NC/AT; PERRL, clear conjunctiva. Rash on face with small pustules, especially around mouth. One spot of bleeding above upper lip.  Neck: supple  Respiratory: CTA b/l  Cardiovascular: +S1/S2; RRR  Abdomen: soft, nontender, BS+. distended.   Extremities: WWP, 1+ peripheral pulses b/l; 1+ pitting edema of LE  : no tenderness or erythema of scrotum  Skin: normal color and turgor; facial rash.  Neurological: AAOx3    MEDICATIONS:  MEDICATIONS  (STANDING):  enoxaparin Injectable 140 milliGRAM(s) SubCutaneous daily  FIRST- Mouthwash  BLM 15 milliLiter(s) Swish and Spit every 6 hours  hydrocortisone 2.5% Cream 1 Application(s) Topical two times a day  influenza   Vaccine 0.5 milliLiter(s) IntraMuscular once  loratadine 10 milliGRAM(s) Oral daily  piperacillin/tazobactam IVPB. 3.375 Gram(s) IV Intermittent every 8 hours  sodium chloride 0.9%. 1000 milliLiter(s) (50 mL/Hr) IV Continuous <Continuous>  vancomycin  IVPB 1000 milliGRAM(s) IV Intermittent every 12 hours    MEDICATIONS  (PRN):  acetaminophen   Tablet .. 650 milliGRAM(s) Oral every 6 hours PRN Temp greater or equal to 38C (100.4F), Mild Pain (1 - 3), Moderate Pain (4 - 6)  polyethylene glycol 3350 17 Gram(s) Oral two times a day PRN Constipation  simethicone 80 milliGRAM(s) Chew two times a day PRN Gas  sodium chloride 0.65% Nasal 1 Spray(s) Both Nostrils once PRN Nasal Congestion      ALLERGIES:  Allergies    sulfa drugs (Unknown)    Intolerances        LABS:                        9.2    5.4   )-----------( 92       ( 27 Sep 2018 06:14 )             29.7         134<L>  |  105  |  32<H>  ----------------------------<  93  4.3   |  21<L>  |  1.10    Ca    7.5<L>      27 Sep 2018 06:14  Phos  2.2       Mg     1.6         TPro  4.4<L>  /  Alb  1.8<L>  /  TBili  1.1  /  DBili  x   /  AST  292<H>  /  ALT  129<H>  /  AlkPhos  276<H>      PT/INR - ( 26 Sep 2018 06:46 )   PT: 15.8 sec;   INR: 1.45 ratio         PTT - ( 26 Sep 2018 06:46 )  PTT:90.0 sec  Urinalysis Basic - ( 25 Sep 2018 08:40 )    Color: Yellow / Appearance: Clear / S.022 / pH: x  Gluc: x / Ketone: Negative  / Bili: Negative / Urobili: Negative   Blood: x / Protein: Trace / Nitrite: Negative   Leuk Esterase: Negative / RBC: 9 /hpf / WBC 1 /hpf   Sq Epi: x / Non Sq Epi: 1 /hpf / Bacteria: Negative        RADIOLOGY & ADDITIONAL TESTS: Reviewed. Patient is a 68y old  Male who presents with a chief complaint of sepsis (26 Sep 2018 17:26)    INTERVAL HPI/OVERNIGHT EVENTS: Was started on vanc yesterday, will have pre-4th dose trough today. Grew listeria in blood, will d/c vanc and zosyn. Switch to amp/gent.     SUBJECTIVE: Patient seen and examined at bedside. Complaining of pain/pressure with urination. Also complaining about nasal cannula, says it keeps falling off. Has some bleeding from facial rash/sores. Denies N/V/CP/SOB,       OBJECTIVE:    VITAL SIGNS:  ICU Vital Signs Last 24 Hrs  T(C): 36.2 (27 Sep 2018 06:32), Max: 36.7 (26 Sep 2018 20:06)  T(F): 97.2 (27 Sep 2018 06:32), Max: 98.1 (26 Sep 2018 20:06)  HR: 87 (27 Sep 2018 06:32) (84 - 99)  BP: 110/70 (27 Sep 2018 06:32) (101/63 - 112/71)  RR: 18 (27 Sep 2018 06:32) (18 - 20)  SpO2: 95% (27 Sep 2018 06:32) (93% - 96%)         @ 07:01  -   @ 07:00  --------------------------------------------------------  IN: 2560 mL / OUT: 1150 mL / NET: 1410 mL      CAPILLARY BLOOD GLUCOSE      PHYSICAL EXAM:    General: NAD  HEENT: NC/AT; PERRL, clear conjunctiva. Rash on face with small pustules, especially around mouth. One spot of bleeding above upper lip.  Neck: supple  Respiratory: CTA b/l  Cardiovascular: +S1/S2; RRR  Abdomen: soft, nontender, BS+. distended.   Extremities: WWP, 1+ peripheral pulses b/l; 1+ pitting edema of LE  : no tenderness or erythema of scrotum  Skin: normal color and turgor; facial rash.  Neurological: AAOx3    MEDICATIONS:  MEDICATIONS  (STANDING):  enoxaparin Injectable 140 milliGRAM(s) SubCutaneous daily  FIRST- Mouthwash  BLM 15 milliLiter(s) Swish and Spit every 6 hours  hydrocortisone 2.5% Cream 1 Application(s) Topical two times a day  influenza   Vaccine 0.5 milliLiter(s) IntraMuscular once  loratadine 10 milliGRAM(s) Oral daily  piperacillin/tazobactam IVPB. 3.375 Gram(s) IV Intermittent every 8 hours  sodium chloride 0.9%. 1000 milliLiter(s) (50 mL/Hr) IV Continuous <Continuous>  vancomycin  IVPB 1000 milliGRAM(s) IV Intermittent every 12 hours    MEDICATIONS  (PRN):  acetaminophen   Tablet .. 650 milliGRAM(s) Oral every 6 hours PRN Temp greater or equal to 38C (100.4F), Mild Pain (1 - 3), Moderate Pain (4 - 6)  polyethylene glycol 3350 17 Gram(s) Oral two times a day PRN Constipation  simethicone 80 milliGRAM(s) Chew two times a day PRN Gas  sodium chloride 0.65% Nasal 1 Spray(s) Both Nostrils once PRN Nasal Congestion      ALLERGIES:  Allergies    sulfa drugs (Unknown)    Intolerances        LABS:                        9.2    5.4   )-----------( 92       ( 27 Sep 2018 06:14 )             29.7         134<L>  |  105  |  32<H>  ----------------------------<  93  4.3   |  21<L>  |  1.10    Ca    7.5<L>      27 Sep 2018 06:14  Phos  2.2       Mg     1.6         TPro  4.4<L>  /  Alb  1.8<L>  /  TBili  1.1  /  DBili  x   /  AST  292<H>  /  ALT  129<H>  /  AlkPhos  276<H>      PT/INR - ( 26 Sep 2018 06:46 )   PT: 15.8 sec;   INR: 1.45 ratio         PTT - ( 26 Sep 2018 06:46 )  PTT:90.0 sec  Urinalysis Basic - ( 25 Sep 2018 08:40 )    Color: Yellow / Appearance: Clear / S.022 / pH: x  Gluc: x / Ketone: Negative  / Bili: Negative / Urobili: Negative   Blood: x / Protein: Trace / Nitrite: Negative   Leuk Esterase: Negative / RBC: 9 /hpf / WBC 1 /hpf   Sq Epi: x / Non Sq Epi: 1 /hpf / Bacteria: Negative        RADIOLOGY & ADDITIONAL TESTS: Reviewed.

## 2018-09-27 NOTE — PHYSICAL THERAPY INITIAL EVALUATION ADULT - STRENGTHENING, PT EVAL
GOAL: Patient will demonstrate a 1/3 increase in strength where deficient within 1 week to assist with performing functional mobility and ADLs.

## 2018-09-27 NOTE — PROGRESS NOTE ADULT - ATTENDING COMMENTS
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  Today he is improved. There is no headache nausea  No induration around Mediport    Antibiotics  Cefipime 9/24  Zosyn 9/24 -->25; 9/26 -->27  Vanco 9/24, 9/25, 9/26  AMP 9/27 -->  Gent 9/27    Suggest  monitor Creatinine, repeat blood cultures  if any symptoms referable to CNS, would perform MRI of brain to exclude abscess  Continue Gent --> 9/30  Continue IV Ampicillin --> 10/8 if repeat cultures negative and patient sustains clinical improvement    I discussed avoidance of deli meats and soft cheeses with patient and his wife

## 2018-09-27 NOTE — PROGRESS NOTE ADULT - PROBLEM SELECTOR PLAN 6
Pt's last chemo was 2 weeks ago, and due for chemo soon.   -f/u Onc c/s: started zarxio for neutropenia  -hydrocortisone cream for facial rash Appears to be chronic   -fibrinogen normal, unlikely in DIC

## 2018-09-28 DIAGNOSIS — R18.8 OTHER ASCITES: ICD-10-CM

## 2018-09-28 LAB
ALBUMIN SERPL ELPH-MCNC: 1.9 G/DL — LOW (ref 3.3–5)
ALP SERPL-CCNC: 350 U/L — HIGH (ref 40–120)
ALT FLD-CCNC: 118 U/L — HIGH (ref 10–45)
ANION GAP SERPL CALC-SCNC: 10 MMOL/L — SIGNIFICANT CHANGE UP (ref 5–17)
AST SERPL-CCNC: 281 U/L — HIGH (ref 10–40)
BILIRUB SERPL-MCNC: 1.1 MG/DL — SIGNIFICANT CHANGE UP (ref 0.2–1.2)
BUN SERPL-MCNC: 33 MG/DL — HIGH (ref 7–23)
CALCIUM SERPL-MCNC: 7.7 MG/DL — LOW (ref 8.4–10.5)
CHLORIDE SERPL-SCNC: 107 MMOL/L — SIGNIFICANT CHANGE UP (ref 96–108)
CO2 SERPL-SCNC: 18 MMOL/L — LOW (ref 22–31)
CREAT SERPL-MCNC: 1 MG/DL — SIGNIFICANT CHANGE UP (ref 0.5–1.3)
GLUCOSE SERPL-MCNC: 133 MG/DL — HIGH (ref 70–99)
GRAM STN FLD: SIGNIFICANT CHANGE UP
GRAM STN FLD: SIGNIFICANT CHANGE UP
HCT VFR BLD CALC: 30.4 % — LOW (ref 39–50)
HGB BLD-MCNC: 9.9 G/DL — LOW (ref 13–17)
MCHC RBC-ENTMCNC: 29.3 PG — SIGNIFICANT CHANGE UP (ref 27–34)
MCHC RBC-ENTMCNC: 32.4 GM/DL — SIGNIFICANT CHANGE UP (ref 32–36)
MCV RBC AUTO: 90.4 FL — SIGNIFICANT CHANGE UP (ref 80–100)
PLATELET # BLD AUTO: 82 K/UL — LOW (ref 150–400)
POTASSIUM SERPL-MCNC: 4.3 MMOL/L — SIGNIFICANT CHANGE UP (ref 3.5–5.3)
POTASSIUM SERPL-SCNC: 4.3 MMOL/L — SIGNIFICANT CHANGE UP (ref 3.5–5.3)
PROT SERPL-MCNC: 4.5 G/DL — LOW (ref 6–8.3)
RBC # BLD: 3.37 M/UL — LOW (ref 4.2–5.8)
RBC # FLD: 19.9 % — HIGH (ref 10.3–14.5)
SODIUM SERPL-SCNC: 135 MMOL/L — SIGNIFICANT CHANGE UP (ref 135–145)
WBC # BLD: 9.8 K/UL — SIGNIFICANT CHANGE UP (ref 3.8–10.5)
WBC # FLD AUTO: 9.8 K/UL — SIGNIFICANT CHANGE UP (ref 3.8–10.5)

## 2018-09-28 PROCEDURE — 99232 SBSQ HOSP IP/OBS MODERATE 35: CPT

## 2018-09-28 PROCEDURE — 99232 SBSQ HOSP IP/OBS MODERATE 35: CPT | Mod: GC

## 2018-09-28 PROCEDURE — 99233 SBSQ HOSP IP/OBS HIGH 50: CPT | Mod: GC

## 2018-09-28 RX ORDER — FUROSEMIDE 40 MG
40 TABLET ORAL DAILY
Qty: 0 | Refills: 0 | Status: DISCONTINUED | OUTPATIENT
Start: 2018-09-28 | End: 2018-09-30

## 2018-09-28 RX ORDER — SALIVA SUBSTITUTE COMB NO.11 351 MG
5 POWDER IN PACKET (EA) MUCOUS MEMBRANE EVERY 4 HOURS
Qty: 0 | Refills: 0 | Status: DISCONTINUED | OUTPATIENT
Start: 2018-09-28 | End: 2018-10-02

## 2018-09-28 RX ADMIN — Medication 100 MILLIGRAM(S): at 13:12

## 2018-09-28 RX ADMIN — BENZOCAINE AND MENTHOL 1 LOZENGE: 5; 1 LIQUID ORAL at 17:37

## 2018-09-28 RX ADMIN — Medication 1 APPLICATION(S): at 17:37

## 2018-09-28 RX ADMIN — Medication 216 GRAM(S): at 17:36

## 2018-09-28 RX ADMIN — MENTHOL AND METHYL SALICYLATE 1 APPLICATION(S): 10; 30 STICK TOPICAL at 05:33

## 2018-09-28 RX ADMIN — Medication 40 MILLIGRAM(S): at 17:36

## 2018-09-28 RX ADMIN — Medication 216 GRAM(S): at 10:23

## 2018-09-28 RX ADMIN — Medication 100 MILLIGRAM(S): at 22:03

## 2018-09-28 RX ADMIN — Medication 216 GRAM(S): at 06:31

## 2018-09-28 RX ADMIN — Medication 216 GRAM(S): at 22:02

## 2018-09-28 RX ADMIN — Medication 216 GRAM(S): at 13:13

## 2018-09-28 RX ADMIN — BENZOCAINE AND MENTHOL 1 LOZENGE: 5; 1 LIQUID ORAL at 13:13

## 2018-09-28 RX ADMIN — LORATADINE 10 MILLIGRAM(S): 10 TABLET ORAL at 13:13

## 2018-09-28 RX ADMIN — SODIUM CHLORIDE 50 MILLILITER(S): 9 INJECTION INTRAMUSCULAR; INTRAVENOUS; SUBCUTANEOUS at 05:31

## 2018-09-28 RX ADMIN — ENOXAPARIN SODIUM 140 MILLIGRAM(S): 100 INJECTION SUBCUTANEOUS at 13:13

## 2018-09-28 RX ADMIN — BENZOCAINE AND MENTHOL 1 LOZENGE: 5; 1 LIQUID ORAL at 01:58

## 2018-09-28 RX ADMIN — BENZOCAINE AND MENTHOL 1 LOZENGE: 5; 1 LIQUID ORAL at 10:23

## 2018-09-28 RX ADMIN — Medication 100 MILLIGRAM(S): at 05:30

## 2018-09-28 RX ADMIN — BENZOCAINE AND MENTHOL 1 LOZENGE: 5; 1 LIQUID ORAL at 05:31

## 2018-09-28 RX ADMIN — DIPHENHYDRAMINE HYDROCHLORIDE AND LIDOCAINE HYDROCHLORIDE AND ALUMINUM HYDROXIDE AND MAGNESIUM HYDRO 15 MILLILITER(S): KIT at 00:29

## 2018-09-28 RX ADMIN — MENTHOL AND METHYL SALICYLATE 1 APPLICATION(S): 10; 30 STICK TOPICAL at 18:14

## 2018-09-28 RX ADMIN — Medication 1 APPLICATION(S): at 05:35

## 2018-09-28 RX ADMIN — BENZOCAINE AND MENTHOL 1 LOZENGE: 5; 1 LIQUID ORAL at 22:02

## 2018-09-28 RX ADMIN — DIPHENHYDRAMINE HYDROCHLORIDE AND LIDOCAINE HYDROCHLORIDE AND ALUMINUM HYDROXIDE AND MAGNESIUM HYDRO 15 MILLILITER(S): KIT at 17:37

## 2018-09-28 RX ADMIN — DIPHENHYDRAMINE HYDROCHLORIDE AND LIDOCAINE HYDROCHLORIDE AND ALUMINUM HYDROXIDE AND MAGNESIUM HYDRO 15 MILLILITER(S): KIT at 13:12

## 2018-09-28 RX ADMIN — Medication 216 GRAM(S): at 01:58

## 2018-09-28 RX ADMIN — Medication 4 MILLIGRAM(S): at 22:05

## 2018-09-28 RX ADMIN — Medication 5 MILLILITER(S): at 17:41

## 2018-09-28 NOTE — PROGRESS NOTE ADULT - PROBLEM SELECTOR PLAN 2
- chemo on hold given acute sepsis  - outpatient f/u Dr. Casey for further therapy once antibiotic course completed

## 2018-09-28 NOTE — PROGRESS NOTE ADULT - ASSESSMENT
68M pmh metastatic colon ca (currently on chemo, last was 2 weeks ago), HTN admitted for sepsis, now found to be bacteremic with listeria.

## 2018-09-28 NOTE — PROGRESS NOTE ADULT - PROBLEM SELECTOR PLAN 6
Appears to be chronic   -fibrinogen normal, unlikely in [facility] Shock liver vs mets to liver vs [place] [facility]  -CT A/P showing worsening hepatic metastatic disease   -RUQ US showing PVT, started lovenox 140mg daily

## 2018-09-28 NOTE — PROGRESS NOTE ADULT - ASSESSMENT
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 Peritoneal fluid results not consistent w SBP.    RVP neg  UA neg  CT C/A/P- peritoneal carcinomatosis, liver mets, pulmonary mets  BCx (9/24) (6/6 bottles including one set from medi-port)- listeria monocytogenes      - c/w ampicillin 2 g IV [id number] for listeria bacteremia  - c/w gentamicin 90 mg IV q8h (x3 days total for synergistic effect)  - Repeat Bcx 9/27 neg to date- monitor  - repeat BCx q48h until clear  - MRI Brain if develops CNS symptoms- currently asymptomatic  - pt looks clinically well and is hemodynamically stable; listeria unlikely to form a biofilm; will hold off on removing mediport for now, if bacteremia persists will need to re-evaluate

## 2018-09-28 NOTE — PROGRESS NOTE ADULT - ATTENDING COMMENTS
Agree with above  Sepsis 2/2 Listeria. Cont IV Ab per ID. Can use mediport if long term AB are going to be needed  Chemo on hold.   Encourage pt OOB

## 2018-09-28 NOTE — PROGRESS NOTE ADULT - SUBJECTIVE AND OBJECTIVE BOX
INTERVAL HPI/OVERNIGHT EVENTS:    Patient reports feeling better, he is out of bed and into chair and walked with physical therapy. No further fevers. Reports improvement in his facial rash.     VITAL SIGNS:  T(F): 97.9 (09-28-18 @ 14:00)  HR: 94 (09-28-18 @ 14:00)  BP: 98/66 (09-28-18 @ 14:00)  RR: 18 (09-28-18 @ 14:00)  SpO2: 96% (09-28-18 @ 14:00)  Wt(kg): --    PHYSICAL EXAM:    Constitutional: NAD, +facial rash  Eyes: EOMI, sclera non-icteric  Neck: supple, no masses, no JVD  Respiratory: CTA b/l, good air entry b/l  Cardiovascular: RRR, no M/R/G  Gastrointestinal: protuberant, NT  Extremities: 3+ LE edema  Neurological: AAOx3      MEDICATIONS  (STANDING):  ampicillin  IVPB 2 Gram(s) IV Intermittent every 4 hours  benzocaine 15 mG/menthol 3.6 mG Lozenge 1 Lozenge Oral every 4 hours  doxazosin 4 milliGRAM(s) Oral at bedtime  enoxaparin Injectable 140 milliGRAM(s) SubCutaneous daily  FIRST- Mouthwash  BLM 15 milliLiter(s) Swish and Spit every 6 hours  furosemide   Injectable 40 milliGRAM(s) IV Push daily  gentamicin   IVPB 90 milliGRAM(s) IV Intermittent every 8 hours  hydrocortisone 2.5% Cream 1 Application(s) Topical two times a day  influenza   Vaccine 0.5 milliLiter(s) IntraMuscular once  loratadine 10 milliGRAM(s) Oral daily  sodium chloride 0.9%. 1000 milliLiter(s) (50 mL/Hr) IV Continuous <Continuous>    MEDICATIONS  (PRN):  acetaminophen   Tablet .. 650 milliGRAM(s) Oral every 6 hours PRN Temp greater or equal to 38C (100.4F), Mild Pain (1 - 3), Moderate Pain (4 - 6)  benzonatate 100 milliGRAM(s) Oral three times a day PRN Cough  Biotene Dry Mouth Oral Rinse 5 milliLiter(s) Swish and Spit every 4 hours PRN Mouth Care  methyl salicylate 14%/menthol 6% Topical Ointment 1 Application(s) Topical two times a day PRN pain  polyethylene glycol 3350 17 Gram(s) Oral two times a day PRN Constipation  simethicone 80 milliGRAM(s) Chew every 4 hours PRN Gas  sodium chloride 0.65% Nasal 1 Spray(s) Both Nostrils once PRN Nasal Congestion      Allergies    sulfa drugs (Unknown)    Intolerances        LABS:                        9.9    9.8   )-----------( 82       ( 28 Sep 2018 10:44 )             30.4     09-28    135  |  107  |  33<H>  ----------------------------<  133<H>  4.3   |  18<L>  |  1.00    Ca    7.7<L>      28 Sep 2018 10:44  Phos  2.2     09-27  Mg     1.6     09-27    TPro  4.5<L>  /  Alb  1.9<L>  /  TBili  1.1  /  DBili  x   /  AST  281<H>  /  ALT  118<H>  /  AlkPhos  350<H>  09-28          RADIOLOGY & ADDITIONAL TESTS:  Studies reviewed.    ASSESSMENT & PLAN:

## 2018-09-28 NOTE — PROGRESS NOTE ADULT - PROBLEM SELECTOR PLAN 9
VTE: on lovenox  Diet: regular   Dispo: home with home PT, needs shower chair and rolling walker will hold antihypertensives in setting of sepsis  - can restart meds if indicated

## 2018-09-28 NOTE — PROGRESS NOTE ADULT - SUBJECTIVE AND OBJECTIVE BOX
Patient is a 68y old  Male who presents with a chief complaint of sepsis (27 Sep 2018 10:43)    INTERVAL HPI/OVERNIGHT EVENTS: No acute events overnight.     SUBJECTIVE: Patient seen and examined at bedside. Complaining of dry mouth and abdominal distension, also with sore throat. Pain on urination has resolved. Had 2 BMs yesterday.         OBJECTIVE:    VITAL SIGNS:  ICU Vital Signs Last 24 Hrs  T(C): 36.4 (28 Sep 2018 05:57), Max: 36.8 (27 Sep 2018 17:52)  T(F): 97.5 (28 Sep 2018 05:57), Max: 98.3 (27 Sep 2018 17:52)  HR: 91 (28 Sep 2018 05:57) (87 - 103)  BP: 110/70 (28 Sep 2018 05:57) (107/70 - 117/74)  BP(mean): --  ABP: --  ABP(mean): --  RR: 20 (28 Sep 2018 05:57) (18 - 20)  SpO2: 92% (28 Sep 2018 05:57) (91% - 96%)        09-27 @ 07:01  -  09-28 @ 07:00  --------------------------------------------------------  IN: 700 mL / OUT: 900 mL / NET: -200 mL      CAPILLARY BLOOD GLUCOSE          PHYSICAL EXAM:    General: NAD  HEENT: NC/AT; PERRL, clear conjunctiva  Neck: supple  Respiratory: CTA b/l  Cardiovascular: +S1/S2; RRR  Abdomen: soft, nontender, but distended seems improved from before. +BS x4  Extremities: WWP, 2+ peripheral pulses b/l; no LE edema  Skin: Facial rash seems to be improving, areas of dried blood.   Neurological: AAOx3    MEDICATIONS:  MEDICATIONS  (STANDING):  ampicillin  IVPB 2 Gram(s) IV Intermittent every 4 hours  benzocaine 15 mG/menthol 3.6 mG Lozenge 1 Lozenge Oral every 4 hours  doxazosin 4 milliGRAM(s) Oral at bedtime  enoxaparin Injectable 140 milliGRAM(s) SubCutaneous daily  FIRST- Mouthwash  BLM 15 milliLiter(s) Swish and Spit every 6 hours  gentamicin   IVPB 90 milliGRAM(s) IV Intermittent every 8 hours  hydrocortisone 2.5% Cream 1 Application(s) Topical two times a day  influenza   Vaccine 0.5 milliLiter(s) IntraMuscular once  loratadine 10 milliGRAM(s) Oral daily  sodium chloride 0.9%. 1000 milliLiter(s) (50 mL/Hr) IV Continuous <Continuous>    MEDICATIONS  (PRN):  acetaminophen   Tablet .. 650 milliGRAM(s) Oral every 6 hours PRN Temp greater or equal to 38C (100.4F), Mild Pain (1 - 3), Moderate Pain (4 - 6)  methyl salicylate 14%/menthol 6% Topical Ointment 1 Application(s) Topical two times a day PRN pain  polyethylene glycol 3350 17 Gram(s) Oral two times a day PRN Constipation  simethicone 80 milliGRAM(s) Chew every 4 hours PRN Gas  sodium chloride 0.65% Nasal 1 Spray(s) Both Nostrils once PRN Nasal Congestion      ALLERGIES:  Allergies    sulfa drugs (Unknown)    Intolerances        LABS:                        9.2    5.4   )-----------( 92       ( 27 Sep 2018 06:14 )             29.7     09-27    134<L>  |  105  |  32<H>  ----------------------------<  93  4.3   |  21<L>  |  1.10    Ca    7.5<L>      27 Sep 2018 06:14  Phos  2.2     09-27  Mg     1.6     09-27    TPro  4.4<L>  /  Alb  1.8<L>  /  TBili  1.1  /  DBili  x   /  AST  292<H>  /  ALT  129<H>  /  AlkPhos  276<H>  09-27          RADIOLOGY & ADDITIONAL TESTS: Reviewed. Patient is a 68y old  Male who presents with a chief complaint of sepsis (27 Sep 2018 10:43)    INTERVAL HPI/OVERNIGHT EVENTS: No acute events overnight.     SUBJECTIVE: Patient seen and examined at bedside. Complaining of dry mouth and abdominal distension, also with sore throat. Pain on urination has resolved. Had 2 BMs yesterday.       OBJECTIVE:    VITAL SIGNS:  ICU Vital Signs Last 24 Hrs  T(C): 36.4 (28 Sep 2018 05:57), Max: 36.8 (27 Sep 2018 17:52)  T(F): 97.5 (28 Sep 2018 05:57), Max: 98.3 (27 Sep 2018 17:52)  HR: 91 (28 Sep 2018 05:57) (87 - 103)  BP: 110/70 (28 Sep 2018 05:57) (107/70 - 117/74)  BP(mean): --  ABP: --  ABP(mean): --  RR: 20 (28 Sep 2018 05:57) (18 - 20)  SpO2: 92% (28 Sep 2018 05:57) (91% - 96%)        09-27 @ 07:01  -  09-28 @ 07:00  --------------------------------------------------------  IN: 700 mL / OUT: 900 mL / NET: -200 mL      CAPILLARY BLOOD GLUCOSE        PHYSICAL EXAM:    General: NAD  HEENT: NC/AT; PERRL, clear conjunctiva  Neck: supple  Respiratory: CTA b/l  Cardiovascular: +S1/S2; RRR  Abdomen: soft, nontender, but distended seems improved from before. +BS x4  Extremities: WWP, 2+ peripheral pulses b/l; no LE edema  Skin: Facial rash seems to be improving, areas of dried blood.   Neurological: AAOx3    MEDICATIONS:  MEDICATIONS  (STANDING):  ampicillin  IVPB 2 Gram(s) IV Intermittent every 4 hours  benzocaine 15 mG/menthol 3.6 mG Lozenge 1 Lozenge Oral every 4 hours  doxazosin 4 milliGRAM(s) Oral at bedtime  enoxaparin Injectable 140 milliGRAM(s) SubCutaneous daily  FIRST- Mouthwash  BLM 15 milliLiter(s) Swish and Spit every 6 hours  gentamicin   IVPB 90 milliGRAM(s) IV Intermittent every 8 hours  hydrocortisone 2.5% Cream 1 Application(s) Topical two times a day  influenza   Vaccine 0.5 milliLiter(s) IntraMuscular once  loratadine 10 milliGRAM(s) Oral daily  sodium chloride 0.9%. 1000 milliLiter(s) (50 mL/Hr) IV Continuous <Continuous>    MEDICATIONS  (PRN):  acetaminophen   Tablet .. 650 milliGRAM(s) Oral every 6 hours PRN Temp greater or equal to 38C (100.4F), Mild Pain (1 - 3), Moderate Pain (4 - 6)  methyl salicylate 14%/menthol 6% Topical Ointment 1 Application(s) Topical two times a day PRN pain  polyethylene glycol 3350 17 Gram(s) Oral two times a day PRN Constipation  simethicone 80 milliGRAM(s) Chew every 4 hours PRN Gas  sodium chloride 0.65% Nasal 1 Spray(s) Both Nostrils once PRN Nasal Congestion      ALLERGIES:  Allergies    sulfa drugs (Unknown)    Intolerances        LABS:                        9.2    5.4   )-----------( 92       ( 27 Sep 2018 06:14 )             29.7     09-27    134<L>  |  105  |  32<H>  ----------------------------<  93  4.3   |  21<L>  |  1.10    Ca    7.5<L>      27 Sep 2018 06:14  Phos  2.2     09-27  Mg     1.6     09-27    TPro  4.4<L>  /  Alb  1.8<L>  /  TBili  1.1  /  DBili  x   /  AST  292<H>  /  ALT  129<H>  /  AlkPhos  276<H>  09-27          RADIOLOGY & ADDITIONAL TESTS: Reviewed.

## 2018-09-28 NOTE — PROGRESS NOTE ADULT - PROBLEM SELECTOR PLAN 5
Shock liver vs mets to liver vs [place] [facility]  -CT A/P showing worsening hepatic metastatic disease   -RUQ US showing PVT, started lovenox 140mg daily pt with c/o increasing abdominal discomfort  will start lasix for diuresis, will continue to monitor  it abdominal discomfort does not improved with diuresis then will need a therapeutic paracentesis.

## 2018-09-28 NOTE — PROGRESS NOTE ADULT - ATTENDING COMMENTS
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 Complains of abdominal distension. There is no headache nausea  No induration around Mediport  Given likely immunosuppression, will extend duration of antibiotics to 3 WEEKS    Antibiotics  Cefepime 9/24  Zosyn 9/24 -->25; 9/26 -->27  Vanco 9/24, 9/25, 9/26  AMP 9/27 -->  Gent 9/27 -->    Suggest  monitor Creatinine, monitor blood cultures  if any symptoms referable to CNS, would perform MRI of brain to exclude abscess  Continue Gent --> 9/30  Continue IV Ampicillin --> 10/15

## 2018-09-28 NOTE — PROGRESS NOTE ADULT - PROBLEM SELECTOR PLAN 4
- antibiotics for listeria monocytogenes bacteremia per ID  - port okay to be maintained per infectious disease

## 2018-09-28 NOTE — PROGRESS NOTE ADULT - PROBLEM SELECTOR PLAN 3
likely 2/2 stopping doxazosin, patient denies history of BPH. Denies dysuria, UA was negative on admission  - restarted doxazosin

## 2018-09-28 NOTE — PROGRESS NOTE ADULT - ASSESSMENT
Patient is a 67 y/o M with a PMH significant for metastatic colon cancer on chemotherapy who presents with sepsis found to have high grade listeria bacteremia

## 2018-09-28 NOTE — PROGRESS NOTE ADULT - PROBLEM SELECTOR PLAN 8
will hold antihypertensives in setting of sepsis  - can restart meds if indicated Pt's last chemo was 2 weeks ago, and due for chemo soon.   -f/u Onc c/s: started zarxio for neutropenia  -hydrocortisone cream for facial rash

## 2018-09-28 NOTE — PROGRESS NOTE ADULT - PROBLEM SELECTOR PLAN 1
With listeria in BCx   - Switched to amp and gent (for synergy)  - As per ID, do not need to remove mediport

## 2018-09-28 NOTE — PROGRESS NOTE ADULT - PROBLEM SELECTOR PLAN 2
on admission was tachypneic, febrile, tachycardic, s/p diag paracentesis negative for SBP, now BCx with listeria   - switched from vanc/zosyn to ampicillin and gent (for synergy) for listeria  - blood cx q48h, a set was sent this AM on admission was tachypneic, febrile, tachycardic, s/p diag paracentesis negative for SBP, now BCx with listeria   pt with sepsis 2/2 Listeria Bacteremia  - switched from vanc/zosyn to ampicillin and gent (for synergy) for listeria  - blood cx q48h, a set was sent this AM

## 2018-09-28 NOTE — PROGRESS NOTE ADULT - SUBJECTIVE AND OBJECTIVE BOX
Follow Up:  Overnight no events. Pt has remained afebrile overnight. Pt feels well w no headache, vision changes. Still w abdominal discomfort.    ROS:    All other systems negative    Constitutional: no fever, no chills  Head: no trauma  Eyes: no vision changes, no eye pain  ENT:  no sore throat, no rhinorrhea  Cardiovascular:  no chest pain, no palpitation  Respiratory:  no SOB, no cough  GI:  abd discomfort, some nausea  urinary: no dysuria, no hematuria, no flank pain  musculoskeletal:  no joint pain, no joint swelling  skin:  no rash  neurology:  no headache, no seizure, no change in mental status  psych: no anxiety, no depression         Allergies  sulfa drugs (Unknown)        ANTIMICROBIALS:  ampicillin  IVPB 2 every 4 hours  gentamicin   IVPB 90 every 8 hours      OTHER MEDS:  acetaminophen   Tablet .. 650 milliGRAM(s) Oral every 6 hours PRN  benzocaine 15 mG/menthol 3.6 mG Lozenge 1 Lozenge Oral every 4 hours  doxazosin 4 milliGRAM(s) Oral at bedtime  enoxaparin Injectable 140 milliGRAM(s) SubCutaneous daily  FIRST- Mouthwash  BLM 15 milliLiter(s) Swish and Spit every 6 hours  furosemide   Injectable 40 milliGRAM(s) IV Push daily  hydrocortisone 2.5% Cream 1 Application(s) Topical two times a day  influenza   Vaccine 0.5 milliLiter(s) IntraMuscular once  loratadine 10 milliGRAM(s) Oral daily  methyl salicylate 14%/menthol 6% Topical Ointment 1 Application(s) Topical two times a day PRN  polyethylene glycol 3350 17 Gram(s) Oral two times a day PRN  simethicone 80 milliGRAM(s) Chew every 4 hours PRN  sodium chloride 0.65% Nasal 1 Spray(s) Both Nostrils once PRN  sodium chloride 0.9%. 1000 milliLiter(s) IV Continuous <Continuous>      Vital Signs Last 24 Hrs  T(C): 36.4 (28 Sep 2018 10:00), Max: 36.8 (27 Sep 2018 17:52)  T(F): 97.5 (28 Sep 2018 10:00), Max: 98.3 (27 Sep 2018 17:52)  HR: 86 (28 Sep 2018 10:00) (86 - 103)  BP: 117/61 (28 Sep 2018 10:00) (107/70 - 117/74)  BP(mean): --  RR: 18 (28 Sep 2018 10:00) (18 - 20)  SpO2: 93% (28 Sep 2018 10:00) (91% - 96%)    Physical Exam:  General:    NAD,  non toxic, A&O x 3  Head: atraumatic, normocephalic  Eye: normal sclera and conjunctiva  ENT:    no oropharyngeal lesions,   no LAD,   neck supple  Cardio:     regular S1, S2  Respiratory:    clear b/l,    no wheezing  abd:    distended, non-TTP  :   no CVAT,  no suprapubic tenderness,   no  mosley  Musculoskeletal:   no joint swelling, b/l LE edema  vascular:  normal pulses  Skin:    facial rash  Neurologic: no focal deficit  psych: normal affect, no suicidal ideation                          9.9    9.8   )-----------( 82       ( 28 Sep 2018 10:44 )             30.4       09-28    135  |  107  |  33<H>  ----------------------------<  133<H>  4.3   |  18<L>  |  1.00    Ca    7.7<L>      28 Sep 2018 10:44  Phos  2.2     09-27  Mg     1.6     09-27    TPro  4.5<L>  /  Alb  1.9<L>  /  TBili  1.1  /  DBili  x   /  AST  281<H>  /  ALT  118<H>  /  AlkPhos  350<H>  09-28          MICROBIOLOGY:  v  .Blood Blood-Peripheral  09-27-18   No growth to date.  --  --      .Blood Blood  09-27-18   No growth to date.  --  --      Peritoneal Peritoneal Fluid  09-25-18   No growth  --    polymorphonuclear leukocytes seen  No organisms seen by cytocentrifuge      .Blood Blood-Peripheral  09-24-18   Growth in aerobic and anaerobic bottles: Listeria monocytogenes  Resistance to ampicillin or penicillin for Listeria  monocytogenes has not been described. L. monocytogenes is intrinsically  resistant to cephalosporins.  --    Growth in anaerobic bottle: Gram Positive Rods  Growth in aerobic bottle: Gram Positive Rods      .Blood Port Device  09-24-18   Growth in aerobic and anaerobic bottles: Listeria monocytogenes  Resistance to ampicillin or penicillin for Listeria  monocytogenes has not been described. L. monocytogenes is intrinsically  resistant to cephalosporins.  --    Growth in anaerobic bottle: Gram Positive Rods  Growth in aerobic bottle: Gram Positive Rods      Rapid RVP Result: NotDetec (09-24 @ 13:35)        RADIOLOGY:    CT c/a/p- Since September 25, 2018, increased size of hepatic metastatic disease.    Moderate volume abdominopelvic ascites.    Peritoneal nodularity, concerning for peritoneal carcinomatosis.    Possible pulmonary metastases.    Small bilateral pleural effusions with associated atelectasis.    Abdominal u/s- Extensive hepatic metastatic disease.     Patent main portal vein and left portal vein with hepatopedal flow.   Patent left and middle hepatic veins.    The right portal vein and right hepatic vein are not visualized, limited   by marked heterogeneity of the right hepatic lobe secondary to metastatic   disease. 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     Contracted gallbladder. No biliary ductal dilatation.

## 2018-09-29 ENCOUNTER — APPOINTMENT (OUTPATIENT)
Dept: INFUSION THERAPY | Facility: HOSPITAL | Age: 68
End: 2018-09-29

## 2018-09-29 LAB
ALBUMIN SERPL ELPH-MCNC: 2 G/DL — LOW (ref 3.3–5)
ALP SERPL-CCNC: 416 U/L — HIGH (ref 40–120)
ALT FLD-CCNC: 113 U/L — HIGH (ref 10–45)
ANION GAP SERPL CALC-SCNC: 11 MMOL/L — SIGNIFICANT CHANGE UP (ref 5–17)
AST SERPL-CCNC: 276 U/L — HIGH (ref 10–40)
BILIRUB SERPL-MCNC: 1 MG/DL — SIGNIFICANT CHANGE UP (ref 0.2–1.2)
BUN SERPL-MCNC: 38 MG/DL — HIGH (ref 7–23)
CALCIUM SERPL-MCNC: 7.9 MG/DL — LOW (ref 8.4–10.5)
CHLORIDE SERPL-SCNC: 105 MMOL/L — SIGNIFICANT CHANGE UP (ref 96–108)
CO2 SERPL-SCNC: 19 MMOL/L — LOW (ref 22–31)
CREAT SERPL-MCNC: 1.15 MG/DL — SIGNIFICANT CHANGE UP (ref 0.5–1.3)
GLUCOSE SERPL-MCNC: 119 MG/DL — HIGH (ref 70–99)
GRAM STN FLD: SIGNIFICANT CHANGE UP
HCT VFR BLD CALC: 30.8 % — LOW (ref 39–50)
HGB BLD-MCNC: 9.5 G/DL — LOW (ref 13–17)
MCHC RBC-ENTMCNC: 27.9 PG — SIGNIFICANT CHANGE UP (ref 27–34)
MCHC RBC-ENTMCNC: 30.8 GM/DL — LOW (ref 32–36)
MCV RBC AUTO: 90.6 FL — SIGNIFICANT CHANGE UP (ref 80–100)
PLATELET # BLD AUTO: 106 K/UL — LOW (ref 150–400)
POTASSIUM SERPL-MCNC: 4.2 MMOL/L — SIGNIFICANT CHANGE UP (ref 3.5–5.3)
POTASSIUM SERPL-SCNC: 4.2 MMOL/L — SIGNIFICANT CHANGE UP (ref 3.5–5.3)
PROT SERPL-MCNC: 4.4 G/DL — LOW (ref 6–8.3)
RBC # BLD: 3.4 M/UL — LOW (ref 4.2–5.8)
RBC # FLD: 20.5 % — HIGH (ref 10.3–14.5)
SODIUM SERPL-SCNC: 135 MMOL/L — SIGNIFICANT CHANGE UP (ref 135–145)
WBC # BLD: 13.5 K/UL — HIGH (ref 3.8–10.5)
WBC # FLD AUTO: 13.5 K/UL — HIGH (ref 3.8–10.5)

## 2018-09-29 PROCEDURE — 99233 SBSQ HOSP IP/OBS HIGH 50: CPT | Mod: GC

## 2018-09-29 PROCEDURE — 99232 SBSQ HOSP IP/OBS MODERATE 35: CPT

## 2018-09-29 RX ORDER — PANTOPRAZOLE SODIUM 20 MG/1
40 TABLET, DELAYED RELEASE ORAL
Qty: 0 | Refills: 0 | Status: DISCONTINUED | OUTPATIENT
Start: 2018-09-29 | End: 2018-10-02

## 2018-09-29 RX ORDER — SALIVA SUBSTITUTE COMB NO.11 351 MG
30 POWDER IN PACKET (EA) MUCOUS MEMBRANE
Qty: 0 | Refills: 0 | Status: DISCONTINUED | OUTPATIENT
Start: 2018-09-29 | End: 2018-10-02

## 2018-09-29 RX ORDER — POLYETHYLENE GLYCOL 3350 17 G/17G
17 POWDER, FOR SOLUTION ORAL DAILY
Qty: 0 | Refills: 0 | Status: DISCONTINUED | OUTPATIENT
Start: 2018-09-29 | End: 2018-10-02

## 2018-09-29 RX ADMIN — BENZOCAINE AND MENTHOL 1 LOZENGE: 5; 1 LIQUID ORAL at 05:42

## 2018-09-29 RX ADMIN — Medication 4 MILLIGRAM(S): at 22:15

## 2018-09-29 RX ADMIN — BENZOCAINE AND MENTHOL 1 LOZENGE: 5; 1 LIQUID ORAL at 02:37

## 2018-09-29 RX ADMIN — Medication 5 MILLILITER(S): at 22:21

## 2018-09-29 RX ADMIN — LORATADINE 10 MILLIGRAM(S): 10 TABLET ORAL at 12:08

## 2018-09-29 RX ADMIN — Medication 216 GRAM(S): at 22:15

## 2018-09-29 RX ADMIN — Medication 216 GRAM(S): at 06:40

## 2018-09-29 RX ADMIN — DIPHENHYDRAMINE HYDROCHLORIDE AND LIDOCAINE HYDROCHLORIDE AND ALUMINUM HYDROXIDE AND MAGNESIUM HYDRO 15 MILLILITER(S): KIT at 12:08

## 2018-09-29 RX ADMIN — BENZOCAINE AND MENTHOL 1 LOZENGE: 5; 1 LIQUID ORAL at 09:42

## 2018-09-29 RX ADMIN — Medication 216 GRAM(S): at 17:22

## 2018-09-29 RX ADMIN — SODIUM CHLORIDE 50 MILLILITER(S): 9 INJECTION INTRAMUSCULAR; INTRAVENOUS; SUBCUTANEOUS at 09:19

## 2018-09-29 RX ADMIN — Medication 40 MILLIGRAM(S): at 05:41

## 2018-09-29 RX ADMIN — Medication 1 APPLICATION(S): at 17:23

## 2018-09-29 RX ADMIN — Medication 216 GRAM(S): at 09:41

## 2018-09-29 RX ADMIN — Medication 1 APPLICATION(S): at 05:43

## 2018-09-29 RX ADMIN — Medication 5 MILLILITER(S): at 12:06

## 2018-09-29 RX ADMIN — Medication 100 MILLIGRAM(S): at 05:40

## 2018-09-29 RX ADMIN — ENOXAPARIN SODIUM 140 MILLIGRAM(S): 100 INJECTION SUBCUTANEOUS at 12:07

## 2018-09-29 RX ADMIN — Medication 100 MILLIGRAM(S): at 22:15

## 2018-09-29 RX ADMIN — Medication 5 MILLILITER(S): at 17:23

## 2018-09-29 RX ADMIN — DIPHENHYDRAMINE HYDROCHLORIDE AND LIDOCAINE HYDROCHLORIDE AND ALUMINUM HYDROXIDE AND MAGNESIUM HYDRO 15 MILLILITER(S): KIT at 05:41

## 2018-09-29 RX ADMIN — Medication 216 GRAM(S): at 14:11

## 2018-09-29 RX ADMIN — BENZOCAINE AND MENTHOL 1 LOZENGE: 5; 1 LIQUID ORAL at 14:11

## 2018-09-29 RX ADMIN — Medication 216 GRAM(S): at 02:36

## 2018-09-29 RX ADMIN — BENZOCAINE AND MENTHOL 1 LOZENGE: 5; 1 LIQUID ORAL at 17:22

## 2018-09-29 RX ADMIN — Medication 100 MILLIGRAM(S): at 14:11

## 2018-09-29 NOTE — PROGRESS NOTE ADULT - ASSESSMENT
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

## 2018-09-29 NOTE — PROGRESS NOTE ADULT - SUBJECTIVE AND OBJECTIVE BOX
Patient is a 68y old  Male who presents with a chief complaint of sepsis (28 Sep 2018 18:44)    INTERVAL HPI/OVERNIGHT EVENTS: No acute evnets overnight. GPR grew in BC from 9/27.    SUBJECTIVE: Patient seen and examined at bedside.     CONSTITUTIONAL: No weakness, fevers or chills  EYES/ENT: No visual changes;  No vertigo or throat pain   NECK: No pain or stiffness  RESPIRATORY: No cough, wheezing, hemoptysis; No shortness of breath  CARDIOVASCULAR: No chest pain or palpitations  GASTROINTESTINAL: No abdominal or epigastric pain. No nausea, vomiting, or hematemesis; No diarrhea or constipation. No melena or hematochezia.  GENITOURINARY: No dysuria, frequency or hematuria  NEUROLOGICAL: No numbness or weakness  SKIN: No itching, rashes    OBJECTIVE:    VITAL SIGNS:  ICU Vital Signs Last 24 Hrs  T(C): 36.8 (29 Sep 2018 04:21), Max: 36.8 (29 Sep 2018 04:21)  T(F): 98.3 (29 Sep 2018 04:21), Max: 98.3 (29 Sep 2018 04:21)  HR: 98 (29 Sep 2018 04:21) (86 - 98)  BP: 120/77 (29 Sep 2018 04:21) (98/66 - 120/77)  RR: 18 (29 Sep 2018 04:21) (18 - 18)  SpO2: 97% (29 Sep 2018 04:21) (93% - 98%)        09-28 @ 07:01  -  09-29 @ 07:00  --------------------------------------------------------  IN: 1470 mL / OUT: 850 mL / NET: 620 mL      CAPILLARY BLOOD GLUCOSE          PHYSICAL EXAM:    General: NAD  HEENT: NC/AT; PERRL, clear conjunctiva  Neck: supple  Respiratory: CTA b/l  Cardiovascular: +S1/S2; RRR  Abdomen: soft, distended, but nontender  Extremities: WWP, 2+ peripheral pulses b/l; 2+ pitting edema  Skin: normal color and turgor; no rash  Neurological: AAOx3    MEDICATIONS:  MEDICATIONS  (STANDING):  ampicillin  IVPB 2 Gram(s) IV Intermittent every 4 hours  benzocaine 15 mG/menthol 3.6 mG Lozenge 1 Lozenge Oral every 4 hours  doxazosin 4 milliGRAM(s) Oral at bedtime  enoxaparin Injectable 140 milliGRAM(s) SubCutaneous daily  FIRST- Mouthwash  BLM 15 milliLiter(s) Swish and Spit every 6 hours  furosemide   Injectable 40 milliGRAM(s) IV Push daily  gentamicin   IVPB 90 milliGRAM(s) IV Intermittent every 8 hours  hydrocortisone 2.5% Cream 1 Application(s) Topical two times a day  influenza   Vaccine 0.5 milliLiter(s) IntraMuscular once  loratadine 10 milliGRAM(s) Oral daily  sodium chloride 0.9%. 1000 milliLiter(s) (50 mL/Hr) IV Continuous <Continuous>    MEDICATIONS  (PRN):  acetaminophen   Tablet .. 650 milliGRAM(s) Oral every 6 hours PRN Temp greater or equal to 38C (100.4F), Mild Pain (1 - 3), Moderate Pain (4 - 6)  benzonatate 100 milliGRAM(s) Oral three times a day PRN Cough  Biotene Dry Mouth Oral Rinse 5 milliLiter(s) Swish and Spit every 4 hours PRN Mouth Care  methyl salicylate 14%/menthol 6% Topical Ointment 1 Application(s) Topical two times a day PRN pain  polyethylene glycol 3350 17 Gram(s) Oral two times a day PRN Constipation  simethicone 80 milliGRAM(s) Chew every 4 hours PRN Gas  sodium chloride 0.65% Nasal 1 Spray(s) Both Nostrils once PRN Nasal Congestion      ALLERGIES:  Allergies    sulfa drugs (Unknown)    Intolerances        LABS:                        9.9    9.8   )-----------( 82       ( 28 Sep 2018 10:44 )             30.4     09-28    135  |  107  |  33<H>  ----------------------------<  133<H>  4.3   |  18<L>  |  1.00    Ca    7.7<L>      28 Sep 2018 10:44    TPro  4.5<L>  /  Alb  1.9<L>  /  TBili  1.1  /  DBili  x   /  AST  281<H>  /  ALT  118<H>  /  AlkPhos  350<H>  09-28          RADIOLOGY & ADDITIONAL TESTS: Reviewed. Patient is a 68y old  Male who presents with a chief complaint of sepsis (28 Sep 2018 18:44)    INTERVAL HPI/OVERNIGHT EVENTS: No acute events overnight. GPR grew in BC from 9/27.    SUBJECTIVE: Patient seen and examined at bedside. States the abdominal distension is slightly improved from yesterday. Still having dry mouth but biotene mouthwash does help. Last urinated yesterday at 6PM, does not feel like a lot of urine came out, but not experiencing pain.        OBJECTIVE:    VITAL SIGNS:  ICU Vital Signs Last 24 Hrs  T(C): 36.8 (29 Sep 2018 04:21), Max: 36.8 (29 Sep 2018 04:21)  T(F): 98.3 (29 Sep 2018 04:21), Max: 98.3 (29 Sep 2018 04:21)  HR: 98 (29 Sep 2018 04:21) (86 - 98)  BP: 120/77 (29 Sep 2018 04:21) (98/66 - 120/77)  RR: 18 (29 Sep 2018 04:21) (18 - 18)  SpO2: 97% (29 Sep 2018 04:21) (93% - 98%)        09-28 @ 07:01  -  09-29 @ 07:00  --------------------------------------------------------  IN: 1470 mL / OUT: 850 mL / NET: 620 mL      CAPILLARY BLOOD GLUCOSE          PHYSICAL EXAM:    General: NAD  HEENT: NC/AT; PERRL, clear conjunctiva  Neck: supple  Respiratory: CTA b/l  Cardiovascular: +S1/S2; RRR  Abdomen: soft, distended, tender to deep palpation diffusely  Extremities: WWP, 2+ peripheral pulses b/l; 2+ pitting edema  Skin: normal color and turgor; facial rash improving  Neurological: AAOx3    MEDICATIONS:  MEDICATIONS  (STANDING):  ampicillin  IVPB 2 Gram(s) IV Intermittent every 4 hours  benzocaine 15 mG/menthol 3.6 mG Lozenge 1 Lozenge Oral every 4 hours  doxazosin 4 milliGRAM(s) Oral at bedtime  enoxaparin Injectable 140 milliGRAM(s) SubCutaneous daily  FIRST- Mouthwash  BLM 15 milliLiter(s) Swish and Spit every 6 hours  furosemide   Injectable 40 milliGRAM(s) IV Push daily  gentamicin   IVPB 90 milliGRAM(s) IV Intermittent every 8 hours  hydrocortisone 2.5% Cream 1 Application(s) Topical two times a day  influenza   Vaccine 0.5 milliLiter(s) IntraMuscular once  loratadine 10 milliGRAM(s) Oral daily  sodium chloride 0.9%. 1000 milliLiter(s) (50 mL/Hr) IV Continuous <Continuous>    MEDICATIONS  (PRN):  acetaminophen   Tablet .. 650 milliGRAM(s) Oral every 6 hours PRN Temp greater or equal to 38C (100.4F), Mild Pain (1 - 3), Moderate Pain (4 - 6)  benzonatate 100 milliGRAM(s) Oral three times a day PRN Cough  Biotene Dry Mouth Oral Rinse 5 milliLiter(s) Swish and Spit every 4 hours PRN Mouth Care  methyl salicylate 14%/menthol 6% Topical Ointment 1 Application(s) Topical two times a day PRN pain  polyethylene glycol 3350 17 Gram(s) Oral two times a day PRN Constipation  simethicone 80 milliGRAM(s) Chew every 4 hours PRN Gas  sodium chloride 0.65% Nasal 1 Spray(s) Both Nostrils once PRN Nasal Congestion      ALLERGIES:  Allergies    sulfa drugs (Unknown)    Intolerances        LABS:                        9.9    9.8   )-----------( 82       ( 28 Sep 2018 10:44 )             30.4     09-28    135  |  107  |  33<H>  ----------------------------<  133<H>  4.3   |  18<L>  |  1.00    Ca    7.7<L>      28 Sep 2018 10:44    TPro  4.5<L>  /  Alb  1.9<L>  /  TBili  1.1  /  DBili  x   /  AST  281<H>  /  ALT  118<H>  /  AlkPhos  350<H>  09-28          RADIOLOGY & ADDITIONAL TESTS: Reviewed.

## 2018-09-29 NOTE — PROVIDER CONTACT NOTE (CRITICAL VALUE NOTIFICATION) - ASSESSMENT
Patient AOx3 and resting comfortably in bed. Currently denies presence of pain, dyspnea and/or discomfort at this time.
Pt Alert and oriented. Pt has been afebrile, Vital signs stable.  Pt currently receiving NS @ 50 ml/hr; Pt has no c/o pain, headache or nausea and vomiting. Pt receiving gentamicin and ampicillin for sepsis.
Pt Alert and oriented. Pt has been afebrile, Vital signs stable.  Pt currently receiving NS @ 50 ml/hr; Pt has no c/o pain, headache or nausea and vomiting. Pt receiving gentamicin and ampicillin for sepsis.

## 2018-09-29 NOTE — PROGRESS NOTE ADULT - PROBLEM SELECTOR PLAN 4
2/2 Shock liver vs mets to liver vs [place] [facility]  - CT A/P showing worsening hepatic metastatic disease   - RUQ US showing PVT, started lovenox 140mg daily

## 2018-09-29 NOTE — PROGRESS NOTE ADULT - ASSESSMENT
68M pmh metastatic colon ca (currently on chemo, last was 2 weeks ago), HTN admitted for sepsis, found to be bacteremic with listeria on ampicillin/gentamicin day 3.

## 2018-09-29 NOTE — PROVIDER CONTACT NOTE (CRITICAL VALUE NOTIFICATION) - SITUATION
1st set preliminary blood culture positive for growth in anaerobic bottle for gram negative rods  2nd set preliminary blood culture positive for growth in anaerobic bottle for gram positive rods  3rd set preliminary blood culture positive for growth in anaerobic bottle for gram positive rods
Blood cultures, Second bottle positive for gram positive rods
Critical value result Blood cultures Aerobic bottle positive gram positive rods

## 2018-09-29 NOTE — PROGRESS NOTE ADULT - SUBJECTIVE AND OBJECTIVE BOX
Follow Up:  Listeria monocytogenes bacteremia    Interval History/ROS: continued discomfort from abdominal distension, complains of dry mouth, no pain    Allergies  sulfa drugs (Unknown)    ANTIMICROBIALS:  ampicillin  IVPB 2 every 4 hours  gentamicin   IVPB 90 every 8 hours    OTHER MEDS:  MEDICATIONS  (STANDING):  acetaminophen   Tablet .. 650 every 6 hours PRN  benzonatate 100 three times a day PRN  doxazosin 4 at bedtime  enoxaparin Injectable 140 daily  furosemide   Injectable 40 daily  influenza   Vaccine 0.5 once  loratadine 10 daily  polyethylene glycol 3350 17 two times a day PRN  simethicone 80 every 4 hours PRN      Vital Signs Last 24 Hrs  T(C): 36.4 (29 Sep 2018 13:49), Max: 36.8 (29 Sep 2018 04:21)  T(F): 97.5 (29 Sep 2018 13:49), Max: 98.3 (29 Sep 2018 04:21)  HR: 77 (29 Sep 2018 13:49) (77 - 98)  BP: 111/79 (29 Sep 2018 13:49) (111/79 - 120/77)  BP(mean): --  RR: 18 (29 Sep 2018 13:49) (18 - 18)  SpO2: 95% (29 Sep 2018 13:49) (95% - 98%)    PHYSICAL EXAM:  General: chronically ill appearing NAD, Non-toxic  Neurology: A&Ox3, nonfocal  Respiratory: Clear to auscultation bilaterally  CV: RRR, S1S2, no murmurs, rubs or gallops  Abdominal: Soft, Non-tender, distended,  Extremities: No edema  Line Sites: Clear right subclavian port site - no erythema or induration  Skin: improved facial seborrhea                        9.5    13.5  )-----------( 106      ( 29 Sep 2018 07:21 )             30.8  WBC Count: 13.5 (09-29 @ 07:21)  WBC Count: 9.8 (09-28 @ 10:44)  WBC Count: 5.4 (09-27 @ 06:14)  WBC Count: 3.7 (09-26 @ 06:46)  WBC Count: 1.4 (09-25 @ 07:18)    09-29    135  |  105  |  38<H>  ----------------------------<  119<H>  4.2   |  19<L>  |  1.15    Ca    7.9<L>      29 Sep 2018 07:21    TPro  4.4<L>  /  Alb  2.0<L>  /  TBili  1.0  /  DBili  x   /  AST  276<H>  /  ALT  113<H>  /  AlkPhos  416<H>  09-29      MICROBIOLOGY:  .Blood Blood-Peripheral  09-27-18   Growth in aerobic bottle: Gram Positive Rods  --    Growth in aerobic bottle: Gram Positive Rods      .Blood Blood  09-27-18   Growth in aerobic bottle: Gram Positive Rods  Growth in anaerobic bottle: Gram Positive Rods  --    Growth in aerobic bottle: Gram Positive Rods  Growth in anaerobic bottle: Gram Positive Rods      Peritoneal Peritoneal Fluid  09-25-18   No growth  --    polymorphonuclear leukocytes seen  No organisms seen by cytocentrifuge      .Blood Blood-Peripheral  09-24-18   Growth in aerobic and anaerobic bottles: Listeria monocytogenes  Resistance to ampicillin or penicillin for Listeria  monocytogenes has not been described. L. monocytogenes is intrinsically  resistant to cephalosporins.  --    Growth in anaerobic bottle: Gram Positive Rods  Growth in aerobic bottle: Gram Positive Rods      .Blood Port Device  09-24-18   Growth in aerobic and anaerobic bottles: Listeria monocytogenes  Resistance to ampicillin or penicillin for Listeria  monocytogenes has not been described. L. monocytogenes is intrinsically  resistant to cephalosporins.  --    Growth in anaerobic bottle: Gram Positive Rods  Growth in aerobic bottle: Gram Positive Rods      .Blood Blood  09-09-18   No growth at 5 days.  --  --    Rapid RVP Result: NotDetec (09-24 @ 13:35)    RADIOLOGY:  < from: CT Abdomen and Pelvis No Cont (09.25.18 @ 09:18) >  IMPRESSION:   Since September 25, 2018, increased size of hepatic metastatic disease.    Moderate volume abdominopelvic ascites.    Peritoneal nodularity, concerning for peritoneal carcinomatosis.    Possible pulmonary metastases.    Small bilateral pleural effusions with associated atelectasis.    < end of copied text >      Chandana Bowers MD; Division of Infectious Disease; Pager: [phone]; nights and weekends: [phone]

## 2018-09-29 NOTE — PROVIDER CONTACT NOTE (CRITICAL VALUE NOTIFICATION) - BACKGROUND
Patient admitted for sepsis
Pt admitted for sepsis, PMH of Colon CA and hypertension
Pt admitted with sepsis. PMH of colon CA and hypertension

## 2018-09-29 NOTE — PROGRESS NOTE ADULT - PROBLEM SELECTOR PLAN 3
pt with c/o increasing abdominal discomfort  - yesterday started lasix for diuresis, put out 850cc yesterday   - if abdominal discomfort does not improve w/diuresis then will need a therapeutic paracentesis. pt with c/o increasing abdominal discomfort, slightly improved today   - yesterday started lasix for diuresis, put out 850cc yesterday   - if abdominal discomfort does not improve w/diuresis then will need a therapeutic paracentesis.

## 2018-09-29 NOTE — PROGRESS NOTE ADULT - PROBLEM SELECTOR PLAN 1
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    - Switched to amp and gent (for synergy x3d, will d/c gen today) Day 3  - As per ID, do not need to remove mediport  - will need at least 2 weeks abx from negative cultures, can use mediport for iv abx outpatient (cleared by onc)  - BC q48h, cultures from 9/27 grew GPR 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    - Switched to amp and gent (for synergy x3d, will d/c gent tomorrow morning) Day 3  - As per ID, do not need to remove mediport  - will need at least 2 weeks abx from negative cultures, can use mediport for iv abx outpatient (cleared by onc)  - BC q48h, cultures from 9/27 grew GPR, repeat BC today

## 2018-09-29 NOTE — PROGRESS NOTE ADULT - PROBLEM SELECTOR PLAN 6
Pt's last chemo was 2 weeks ago, will be on hold for now given bacteremia  - f/u Onc recs, s/p 1x zarxio for neutropenia  - hydrocortisone cream for facial rash

## 2018-09-30 LAB
CULTURE RESULTS: SIGNIFICANT CHANGE UP
SPECIMEN SOURCE: SIGNIFICANT CHANGE UP

## 2018-09-30 PROCEDURE — 99233 SBSQ HOSP IP/OBS HIGH 50: CPT | Mod: GC

## 2018-09-30 PROCEDURE — 99232 SBSQ HOSP IP/OBS MODERATE 35: CPT

## 2018-09-30 RX ORDER — FUROSEMIDE 40 MG
60 TABLET ORAL EVERY 12 HOURS
Qty: 0 | Refills: 0 | Status: DISCONTINUED | OUTPATIENT
Start: 2018-09-30 | End: 2018-10-01

## 2018-09-30 RX ORDER — FUROSEMIDE 40 MG
60 TABLET ORAL EVERY 12 HOURS
Qty: 0 | Refills: 0 | Status: DISCONTINUED | OUTPATIENT
Start: 2018-09-30 | End: 2018-09-30

## 2018-09-30 RX ORDER — MULTIVIT WITH MIN/MFOLATE/K2 340-15/3 G
300 POWDER (GRAM) ORAL ONCE
Qty: 0 | Refills: 0 | Status: COMPLETED | OUTPATIENT
Start: 2018-09-30 | End: 2018-10-01

## 2018-09-30 RX ADMIN — Medication 60 MILLIGRAM(S): at 18:07

## 2018-09-30 RX ADMIN — Medication 216 GRAM(S): at 10:11

## 2018-09-30 RX ADMIN — Medication 1 APPLICATION(S): at 05:39

## 2018-09-30 RX ADMIN — BENZOCAINE AND MENTHOL 1 LOZENGE: 5; 1 LIQUID ORAL at 18:08

## 2018-09-30 RX ADMIN — Medication 100 MILLIGRAM(S): at 13:37

## 2018-09-30 RX ADMIN — ENOXAPARIN SODIUM 140 MILLIGRAM(S): 100 INJECTION SUBCUTANEOUS at 12:46

## 2018-09-30 RX ADMIN — LORATADINE 10 MILLIGRAM(S): 10 TABLET ORAL at 12:48

## 2018-09-30 RX ADMIN — SODIUM CHLORIDE 50 MILLILITER(S): 9 INJECTION INTRAMUSCULAR; INTRAVENOUS; SUBCUTANEOUS at 10:11

## 2018-09-30 RX ADMIN — Medication 100 MILLIGRAM(S): at 05:38

## 2018-09-30 RX ADMIN — Medication 1 APPLICATION(S): at 18:09

## 2018-09-30 RX ADMIN — Medication 40 MILLIGRAM(S): at 05:38

## 2018-09-30 RX ADMIN — DIPHENHYDRAMINE HYDROCHLORIDE AND LIDOCAINE HYDROCHLORIDE AND ALUMINUM HYDROXIDE AND MAGNESIUM HYDRO 15 MILLILITER(S): KIT at 18:08

## 2018-09-30 RX ADMIN — PANTOPRAZOLE SODIUM 40 MILLIGRAM(S): 20 TABLET, DELAYED RELEASE ORAL at 06:41

## 2018-09-30 RX ADMIN — Medication 216 GRAM(S): at 01:54

## 2018-09-30 RX ADMIN — Medication 30 MILLILITER(S): at 12:48

## 2018-09-30 RX ADMIN — Medication 4 MILLIGRAM(S): at 21:05

## 2018-09-30 RX ADMIN — Medication 216 GRAM(S): at 13:37

## 2018-09-30 RX ADMIN — Medication 100 MILLIGRAM(S): at 21:40

## 2018-09-30 RX ADMIN — Medication 216 GRAM(S): at 18:07

## 2018-09-30 RX ADMIN — POLYETHYLENE GLYCOL 3350 17 GRAM(S): 17 POWDER, FOR SOLUTION ORAL at 12:48

## 2018-09-30 RX ADMIN — Medication 216 GRAM(S): at 06:40

## 2018-09-30 RX ADMIN — DIPHENHYDRAMINE HYDROCHLORIDE AND LIDOCAINE HYDROCHLORIDE AND ALUMINUM HYDROXIDE AND MAGNESIUM HYDRO 15 MILLILITER(S): KIT at 12:47

## 2018-09-30 RX ADMIN — Medication 216 GRAM(S): at 21:09

## 2018-09-30 NOTE — PROCEDURE NOTE - ADDITIONAL PROCEDURE DETAILS
Patient with anasarca, scrotal edema, nurses unable to straight cath or place indwelling catheter.  Placed 16F Coude catheter under sterile technique.

## 2018-09-30 NOTE — PROCEDURE NOTE - NSURITECHNIQUE_GU_A_CORE
The catheter was appropriately lubricated/The collection bag is below the level of the patient and urinary bladder/The site was cleaned with soap/water and sterile solution (betadine)/A sterile drape was used to cover all adjacent areas/Proper hand hygiene was performed/Sterile gloves were worn for the duration of the procedure/The catheter was secured with a securement device (e.g. StatLock)

## 2018-09-30 NOTE — PROGRESS NOTE ADULT - PROBLEM SELECTOR PLAN 3
pt with c/o increasing abdominal discomfort, slightly improved today   - yesterday started lasix for diuresis, mosley placed with lasix 60 mg IVP bid ordered, Cr 1.00  - if abdominal discomfort does not improve w/diuresis then will need a therapeutic paracentesis tomorrow

## 2018-09-30 NOTE — PROGRESS NOTE ADULT - SUBJECTIVE AND OBJECTIVE BOX
Follow Up:  Listeria monocytogenes bacteremia    Interval History/ROS: no headache, nausea. Notes fatigue, abd distension, dry mouth. Requiring mosley.    Allergies  sulfa drugs (Unknown)    ANTIMICROBIALS:  ampicillin  IVPB 2 every 4 hours  gentamicin   IVPB 90 every 8 hours    OTHER MEDS:  MEDICATIONS  (STANDING):  acetaminophen   Tablet .. 650 every 6 hours PRN  benzonatate 100 three times a day PRN  doxazosin 4 at bedtime  enoxaparin Injectable 140 daily  furosemide   Injectable 40 daily  influenza   Vaccine 0.5 once  loratadine 10 daily  pantoprazole    Tablet 40 before breakfast  polyethylene glycol 3350 17 daily  simethicone 80 every 4 hours PRN      Vital Signs Last 24 Hrs  T(C): 36.4 (30 Sep 2018 04:59), Max: 36.6 (29 Sep 2018 22:09)  T(F): 97.5 (30 Sep 2018 04:59), Max: 97.9 (29 Sep 2018 22:09)  HR: 101 (30 Sep 2018 04:59) (77 - 103)  BP: 110/74 (30 Sep 2018 04:59) (110/74 - 113/76)  BP(mean): --  RR: 20 (30 Sep 2018 04:59) (18 - 20)  SpO2: 95% (30 Sep 2018 04:59) (92% - 95%)    PHYSICAL EXAM:  General: NAD, Non-toxic  Neurology: A&Ox3, fatigued, generalized weakness  Respiratory: Clear to auscultation bilaterally  CV: RRR, S1S2, no murmurs, rubs or gallops  Abdominal: Soft, Non-tender, distended  Extremities: No edema  Line Sites: Clear  Skin: No rash                        9.5    13.5  )-----------( 106      ( 29 Sep 2018 07:21 )             30.8       09-29    135  |  105  |  38<H>  ----------------------------<  119<H>  4.2   |  19<L>  |  1.15    Ca    7.9<L>      29 Sep 2018 07:21    TPro  4.4<L>  /  Alb  2.0<L>  /  TBili  1.0  /  DBili  x   /  AST  276<H>  /  ALT  113<H>  /  AlkPhos  416<H>  09-29      MICROBIOLOGY:  .Blood Blood-Peripheral  09-29-18   No growth to date.  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  See previous culture [id number]  --    Growth in aerobic bottle: Gram Positive Rods      .Blood Blood  09-27-18   Growth in aerobic and anaerobic bottles: Listeria monocytogenes  See previous culture [id number]  --    Growth in aerobic bottle: Gram Positive Rods  Growth in anaerobic bottle: Gram Positive Rods      Peritoneal Peritoneal Fluid  09-25-18   No growth  --    polymorphonuclear leukocytes seen  No organisms seen by cytocentrifuge      .Blood Blood-Peripheral  09-24-18   Growth in aerobic and anaerobic bottles: Listeria monocytogenes  Resistance to ampicillin or penicillin for Listeria  monocytogenes has not been described. L. monocytogenes is intrinsically  resistant to cephalosporins.  --    Growth in anaerobic bottle: Gram Positive Rods  Growth in aerobic bottle: Gram Positive Rods      .Blood Port Device  09-24-18   Growth in aerobic and anaerobic bottles: Listeria monocytogenes  Resistance to ampicillin or penicillin for Listeria  monocytogenes has not been described. L. monocytogenes is intrinsically  resistant to cephalosporins.  --    Growth in anaerobic bottle: Gram Positive Rods  Growth in aerobic bottle: Gram Positive Rods      .Blood Blood  09-09-18   No growth at 5 days.  --  --    Rapid RVP Result: NotDetec (09-24 @ 13:35)    RADIOLOGY:  < from: US Abdomen Upper Quadrant Right (09.25.18 @ 11:52) >  IMPRESSION:     Extensive hepatic metastatic disease.   Patent main portal vein and left portal vein with hepatopedal flow.   Patent left and middle hepatic veins.  The right portal vein and right hepatic vein are not visualized, limited   by marked heterogeneity of the right hepatic lobe secondary to metastatic   disease. 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   Contracted gallbladder. No biliary ductal dilatation.    < end of copied text >      Chandana Bowers MD; Division of Infectious Disease; Pager: [phone]; nights and weekends: [phone]

## 2018-09-30 NOTE — PROGRESS NOTE ADULT - ASSESSMENT
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  sustained clinical improvement  deconditioned  likely protein malnutrition  Leukocytosis  Given likely immunosuppression, will extend duration of antibiotics to 3 WEEKS    Antibiotics  Cefepime 9/24  Zosyn 9/24 -->25; 9/26 -->27  Vanco 9/24, 9/25, 9/26  AMP 9/27 -->  Gent 9/27 -->    Suggest  monitor blood cultures  if any symptoms referable to CNS, would perform MRI of brain to exclude abscess  DIScontinue Gent after last dose today --> 9/30  Continue IV Ampicillin --> (at least)  10/15   Ensure Plus, ProStat protein supplementation      I will be rotating to different hospital next month: ID service will continue to follow

## 2018-09-30 NOTE — PROGRESS NOTE ADULT - PROBLEM SELECTOR PLAN 1
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    - Switched to amp and gent (for synergy x4d, will d/c gent tomorrow) Day 4  - As per ID, do not need to remove mediport  - will need at least 2 weeks abx from negative cultures, can use mediport for iv abx outpatient (cleared by onc)  - BC q48h, cultures from 9/27 grew GPR, repeat BC yesterday NGTD

## 2018-09-30 NOTE — PROGRESS NOTE ADULT - PROBLEM SELECTOR PLAN 4
2/2 Shock liver vs mets to liver vs [place] [facility]  - CT A/P showing worsening hepatic metastatic disease   - RUQ US showing PVT, started lovenox 140mg daily

## 2018-09-30 NOTE — PROGRESS NOTE ADULT - ATTENDING COMMENTS
Listeria bacteremia - will need 3 wks course of abx per Dr. Bowers, f/u bld cxs until they Clear, ID following  Ascites - will need IR paracentesis, c/w diuresis for ascites and scrotal swelling which is all likely due to the fluids he initally received when he was septic on admission.   Urinary Retention - mosley catheter placed.

## 2018-09-30 NOTE — PROGRESS NOTE ADULT - SUBJECTIVE AND OBJECTIVE BOX
Patient is a 68y old  Male who presents with a chief complaint of sepsis (30 Sep 2018 13:43)      SUBJECTIVE / OVERNIGHT EVENTS:    Pt seen and examined at bedside. Overnight the patient had urinary retention, straight catheterization was attempted but unsuccessful. He refused AM labs. This morning the patient is complaining of significant scrotal edema and pressure within his abdomen. No SOB, no CP, improved cough symptoms.     MEDICATIONS  (STANDING):  ampicillin  IVPB 2 Gram(s) IV Intermittent every 4 hours  benzocaine 15 mG/menthol 3.6 mG Lozenge 1 Lozenge Oral every 4 hours  doxazosin 4 milliGRAM(s) Oral at bedtime  enoxaparin Injectable 140 milliGRAM(s) SubCutaneous daily  FIRST- Mouthwash  BLM 15 milliLiter(s) Swish and Spit every 6 hours  furosemide   Injectable 60 milliGRAM(s) IV Push every 12 hours  gentamicin   IVPB 90 milliGRAM(s) IV Intermittent every 8 hours  hydrocortisone 2.5% Cream 1 Application(s) Topical two times a day  influenza   Vaccine 0.5 milliLiter(s) IntraMuscular once  loratadine 10 milliGRAM(s) Oral daily  pantoprazole    Tablet 40 milliGRAM(s) Oral before breakfast  polyethylene glycol 3350 17 Gram(s) Oral daily    MEDICATIONS  (PRN):  acetaminophen   Tablet .. 650 milliGRAM(s) Oral every 6 hours PRN Temp greater or equal to 38C (100.4F), Mild Pain (1 - 3), Moderate Pain (4 - 6)  benzonatate 100 milliGRAM(s) Oral three times a day PRN Cough  Biotene Dry Mouth Oral Rinse 5 milliLiter(s) Swish and Spit every 4 hours PRN Mouth Care  magnesium citrate Solution 300 milliLiter(s) Oral once PRN Constipation  methyl salicylate 14%/menthol 6% Topical Ointment 1 Application(s) Topical two times a day PRN pain  Saliva Substitute (CAPHOSOL) 30 milliLiter(s) Swish and Spit two times a day PRN dry mouth  simethicone 80 milliGRAM(s) Chew every 4 hours PRN Gas  sodium chloride 0.65% Nasal 1 Spray(s) Both Nostrils once PRN Nasal Congestion      Vital Signs Last 24 Hrs  T(C): 36.4 (30 Sep 2018 14:27), Max: 36.6 (29 Sep 2018 22:09)  T(F): 97.6 (30 Sep 2018 14:27), Max: 97.9 (29 Sep 2018 22:09)  HR: 101 (30 Sep 2018 14:27) (101 - 103)  BP: 108/72 (30 Sep 2018 14:27) (108/72 - 113/76)  BP(mean): --  RR: 19 (30 Sep 2018 14:27) (19 - 20)  SpO2: 95% (30 Sep 2018 14:27) (92% - 95%)  CAPILLARY BLOOD GLUCOSE        I&O's Summary    29 Sep 2018 07:01  -  30 Sep 2018 07:00  --------------------------------------------------------  IN: 2910 mL / OUT: 1200 mL / NET: 1710 mL    30 Sep 2018 07:01  -  30 Sep 2018 17:35  --------------------------------------------------------  IN: 1260 mL / OUT: 150 mL / NET: 1110 mL        PHYSICAL EXAM:  Vital Signs Last 24 Hrs  T(C): 36.4 (09-30-18 @ 14:27)  T(F): 97.6 (09-30-18 @ 14:27), Max: 97.9 (09-29-18 @ 22:09)  HR: 101 (09-30-18 @ 14:27) (101 - 103)  BP: 108/72 (09-30-18 @ 14:27)  BP(mean): --  RR: 19 (09-30-18 @ 14:27) (19 - 20)  SpO2: 95% (09-30-18 @ 14:27) (92% - 95%)  Wt(kg): --    09-29 @ 07:01  -  09-30 @ 07:00  --------------------------------------------------------  IN: 2910 mL / OUT: 1200 mL / NET: 1710 mL    09-30 @ 07:01  -  09-30 @ 17:35  --------------------------------------------------------  IN: 1260 mL / OUT: 150 mL / NET: 1110 mL      Constitutional: NAD, awake and alert  EYES: EOMI  ENT:  Normal Hearing, no tonsillar exudates   Neck: Soft and supple , no thyromegaly   Respiratory: Breath sounds are clear bilaterally, No wheezing, rales or rhonchi  Cardiovascular: S1 and S2, regular rate and rhythm, no Murmurs, gallops or rubs, no JVD,    Gastrointestinal: Bowel Sounds present, soft, nontender, distended , no guarding, no rebound  : scrotal edema  Extremities: No cyanosis or clubbing; warm to touch, 3+ pitting edema b/l  Vascular: 2+ peripheral pulses lower ex  Neurological: No focal deficits, CN II-XII intact bilaterally, sensation to light touch intact in all extremities, gait intact. Pupils are equally reactive to light and symmetrical in size.   Musculoskeletal: 5/5 strength b/l upper and lower extremities; no joint swelling.  Skin: No rashes  Psych: no depression or anhedonia, AAOx3  HEME: no bruises, no nose bleeds      LABS:                        9.5    13.5  )-----------( 106      ( 29 Sep 2018 07:21 )             30.8     09-29    135  |  105  |  38<H>  ----------------------------<  119<H>  4.2   |  19<L>  |  1.15    Ca    7.9<L>      29 Sep 2018 07:21    TPro  4.4<L>  /  Alb  2.0<L>  /  TBili  1.0  /  DBili  x   /  AST  276<H>  /  ALT  113<H>  /  AlkPhos  416<H>  09-29              RADIOLOGY & ADDITIONAL TESTS:    Imaging Personally Reviewed:    Consultant(s) Notes Reviewed:      Care Discussed with Consultants/Other Providers:

## 2018-10-01 DIAGNOSIS — R06.00 DYSPNEA, UNSPECIFIED: ICD-10-CM

## 2018-10-01 LAB
ALBUMIN SERPL ELPH-MCNC: 1.8 G/DL — LOW (ref 3.3–5)
ALP SERPL-CCNC: 526 U/L — HIGH (ref 40–120)
ALT FLD-CCNC: 112 U/L — HIGH (ref 10–45)
ANION GAP SERPL CALC-SCNC: 12 MMOL/L — SIGNIFICANT CHANGE UP (ref 5–17)
ANION GAP SERPL CALC-SCNC: 19 MMOL/L — HIGH (ref 5–17)
AST SERPL-CCNC: 311 U/L — HIGH (ref 10–40)
BASE EXCESS BLDA CALC-SCNC: -11.4 MMOL/L — LOW (ref -2–2)
BASOPHILS # BLD AUTO: 0 K/UL — SIGNIFICANT CHANGE UP (ref 0–0.2)
BASOPHILS NFR BLD AUTO: 0 % — SIGNIFICANT CHANGE UP (ref 0–2)
BILIRUB SERPL-MCNC: 1.1 MG/DL — SIGNIFICANT CHANGE UP (ref 0.2–1.2)
BUN SERPL-MCNC: 50 MG/DL — HIGH (ref 7–23)
BUN SERPL-MCNC: 55 MG/DL — HIGH (ref 7–23)
CALCIUM SERPL-MCNC: 7.6 MG/DL — LOW (ref 8.4–10.5)
CALCIUM SERPL-MCNC: 7.9 MG/DL — LOW (ref 8.4–10.5)
CHLORIDE SERPL-SCNC: 103 MMOL/L — SIGNIFICANT CHANGE UP (ref 96–108)
CHLORIDE SERPL-SCNC: 104 MMOL/L — SIGNIFICANT CHANGE UP (ref 96–108)
CO2 BLDA-SCNC: 13 MMOL/L — LOW (ref 22–30)
CO2 SERPL-SCNC: 15 MMOL/L — LOW (ref 22–31)
CO2 SERPL-SCNC: 19 MMOL/L — LOW (ref 22–31)
CREAT SERPL-MCNC: 1.63 MG/DL — HIGH (ref 0.5–1.3)
CREAT SERPL-MCNC: 1.98 MG/DL — HIGH (ref 0.5–1.3)
CULTURE RESULTS: SIGNIFICANT CHANGE UP
EOSINOPHIL # BLD AUTO: 0.1 K/UL — SIGNIFICANT CHANGE UP (ref 0–0.5)
EOSINOPHIL NFR BLD AUTO: 1 % — SIGNIFICANT CHANGE UP (ref 0–6)
GAS PNL BLDA: SIGNIFICANT CHANGE UP
GAS PNL BLDV: SIGNIFICANT CHANGE UP
GLUCOSE SERPL-MCNC: 124 MG/DL — HIGH (ref 70–99)
GLUCOSE SERPL-MCNC: 148 MG/DL — HIGH (ref 70–99)
HCO3 BLDA-SCNC: 12 MMOL/L — LOW (ref 21–29)
HCT VFR BLD CALC: 31.9 % — LOW (ref 39–50)
HGB BLD-MCNC: 10.2 G/DL — LOW (ref 13–17)
HOROWITZ INDEX BLDA+IHG-RTO: 35 — SIGNIFICANT CHANGE UP
LYMPHOCYTES # BLD AUTO: 2.1 K/UL — SIGNIFICANT CHANGE UP (ref 1–3.3)
LYMPHOCYTES # BLD AUTO: 8 % — LOW (ref 13–44)
MCHC RBC-ENTMCNC: 28.9 PG — SIGNIFICANT CHANGE UP (ref 27–34)
MCHC RBC-ENTMCNC: 32.1 GM/DL — SIGNIFICANT CHANGE UP (ref 32–36)
MCV RBC AUTO: 90 FL — SIGNIFICANT CHANGE UP (ref 80–100)
MONOCYTES # BLD AUTO: 2 K/UL — HIGH (ref 0–0.9)
MONOCYTES NFR BLD AUTO: 13 % — SIGNIFICANT CHANGE UP (ref 2–14)
NEUTROPHILS # BLD AUTO: 16.2 K/UL — HIGH (ref 1.8–7.4)
NEUTROPHILS NFR BLD AUTO: 68 % — SIGNIFICANT CHANGE UP (ref 43–77)
NON-GYNECOLOGICAL CYTOLOGY STUDY: SIGNIFICANT CHANGE UP
PCO2 BLDA: 22 MMHG — LOW (ref 32–46)
PH BLDA: 7.36 — SIGNIFICANT CHANGE UP (ref 7.35–7.45)
PLATELET # BLD AUTO: 87 K/UL — LOW (ref 150–400)
PO2 BLDA: 71 MMHG — LOW (ref 74–108)
POTASSIUM SERPL-MCNC: 4.6 MMOL/L — SIGNIFICANT CHANGE UP (ref 3.5–5.3)
POTASSIUM SERPL-MCNC: 5.1 MMOL/L — SIGNIFICANT CHANGE UP (ref 3.5–5.3)
POTASSIUM SERPL-SCNC: 4.6 MMOL/L — SIGNIFICANT CHANGE UP (ref 3.5–5.3)
POTASSIUM SERPL-SCNC: 5.1 MMOL/L — SIGNIFICANT CHANGE UP (ref 3.5–5.3)
PROT SERPL-MCNC: 4.7 G/DL — LOW (ref 6–8.3)
RBC # BLD: 3.54 M/UL — LOW (ref 4.2–5.8)
RBC # FLD: 20.7 % — HIGH (ref 10.3–14.5)
SAO2 % BLDA: 94 % — SIGNIFICANT CHANGE UP (ref 92–96)
SODIUM SERPL-SCNC: 135 MMOL/L — SIGNIFICANT CHANGE UP (ref 135–145)
SODIUM SERPL-SCNC: 137 MMOL/L — SIGNIFICANT CHANGE UP (ref 135–145)
SPECIMEN SOURCE: SIGNIFICANT CHANGE UP
WBC # BLD: 20.4 K/UL — HIGH (ref 3.8–10.5)
WBC # FLD AUTO: 20.4 K/UL — HIGH (ref 3.8–10.5)

## 2018-10-01 PROCEDURE — 71045 X-RAY EXAM CHEST 1 VIEW: CPT | Mod: 26

## 2018-10-01 PROCEDURE — 99232 SBSQ HOSP IP/OBS MODERATE 35: CPT

## 2018-10-01 PROCEDURE — 99233 SBSQ HOSP IP/OBS HIGH 50: CPT | Mod: GC

## 2018-10-01 PROCEDURE — 99223 1ST HOSP IP/OBS HIGH 75: CPT | Mod: GC

## 2018-10-01 RX ORDER — FUROSEMIDE 40 MG
40 TABLET ORAL ONCE
Qty: 0 | Refills: 0 | Status: COMPLETED | OUTPATIENT
Start: 2018-10-01 | End: 2018-10-01

## 2018-10-01 RX ORDER — ALBUMIN HUMAN 25 %
50 VIAL (ML) INTRAVENOUS EVERY 6 HOURS
Qty: 0 | Refills: 0 | Status: DISCONTINUED | OUTPATIENT
Start: 2018-10-01 | End: 2018-10-02

## 2018-10-01 RX ORDER — ALBUMIN HUMAN 25 %
250 VIAL (ML) INTRAVENOUS EVERY 6 HOURS
Qty: 0 | Refills: 0 | Status: DISCONTINUED | OUTPATIENT
Start: 2018-10-01 | End: 2018-10-01

## 2018-10-01 RX ORDER — MEROPENEM 1 G/30ML
1000 INJECTION INTRAVENOUS ONCE
Qty: 0 | Refills: 0 | Status: COMPLETED | OUTPATIENT
Start: 2018-10-01 | End: 2018-10-01

## 2018-10-01 RX ORDER — MEROPENEM 1 G/30ML
1000 INJECTION INTRAVENOUS EVERY 12 HOURS
Qty: 0 | Refills: 0 | Status: DISCONTINUED | OUTPATIENT
Start: 2018-10-02 | End: 2018-10-03

## 2018-10-01 RX ORDER — VANCOMYCIN HCL 1 G
1000 VIAL (EA) INTRAVENOUS ONCE
Qty: 0 | Refills: 0 | Status: COMPLETED | OUTPATIENT
Start: 2018-10-01 | End: 2018-10-01

## 2018-10-01 RX ORDER — MEROPENEM 1 G/30ML
INJECTION INTRAVENOUS
Qty: 0 | Refills: 0 | Status: DISCONTINUED | OUTPATIENT
Start: 2018-10-01 | End: 2018-10-03

## 2018-10-01 RX ORDER — ALBUMIN HUMAN 25 %
50 VIAL (ML) INTRAVENOUS EVERY 6 HOURS
Qty: 0 | Refills: 0 | Status: DISCONTINUED | OUTPATIENT
Start: 2018-10-01 | End: 2018-10-01

## 2018-10-01 RX ORDER — FUROSEMIDE 40 MG
40 TABLET ORAL DAILY
Qty: 0 | Refills: 0 | Status: DISCONTINUED | OUTPATIENT
Start: 2018-10-01 | End: 2018-10-02

## 2018-10-01 RX ADMIN — BENZOCAINE AND MENTHOL 1 LOZENGE: 5; 1 LIQUID ORAL at 05:45

## 2018-10-01 RX ADMIN — POLYETHYLENE GLYCOL 3350 17 GRAM(S): 17 POWDER, FOR SOLUTION ORAL at 13:04

## 2018-10-01 RX ADMIN — Medication 216 GRAM(S): at 10:14

## 2018-10-01 RX ADMIN — Medication 216 GRAM(S): at 01:58

## 2018-10-01 RX ADMIN — Medication 216 GRAM(S): at 13:05

## 2018-10-01 RX ADMIN — Medication 50 MILLILITER(S): at 13:04

## 2018-10-01 RX ADMIN — ENOXAPARIN SODIUM 140 MILLIGRAM(S): 100 INJECTION SUBCUTANEOUS at 13:01

## 2018-10-01 RX ADMIN — PANTOPRAZOLE SODIUM 40 MILLIGRAM(S): 20 TABLET, DELAYED RELEASE ORAL at 06:48

## 2018-10-01 RX ADMIN — MEROPENEM 100 MILLIGRAM(S): 1 INJECTION INTRAVENOUS at 22:47

## 2018-10-01 RX ADMIN — Medication 40 MILLIGRAM(S): at 18:15

## 2018-10-01 RX ADMIN — Medication 50 MILLILITER(S): at 18:18

## 2018-10-01 RX ADMIN — Medication 1 APPLICATION(S): at 05:47

## 2018-10-01 RX ADMIN — Medication 216 GRAM(S): at 05:45

## 2018-10-01 RX ADMIN — Medication 100 MILLIGRAM(S): at 05:18

## 2018-10-01 RX ADMIN — DIPHENHYDRAMINE HYDROCHLORIDE AND LIDOCAINE HYDROCHLORIDE AND ALUMINUM HYDROXIDE AND MAGNESIUM HYDRO 15 MILLILITER(S): KIT at 05:46

## 2018-10-01 RX ADMIN — Medication 250 MILLIGRAM(S): at 22:48

## 2018-10-01 RX ADMIN — Medication 216 GRAM(S): at 18:15

## 2018-10-01 RX ADMIN — Medication 300 MILLILITER(S): at 13:05

## 2018-10-01 RX ADMIN — DIPHENHYDRAMINE HYDROCHLORIDE AND LIDOCAINE HYDROCHLORIDE AND ALUMINUM HYDROXIDE AND MAGNESIUM HYDRO 15 MILLILITER(S): KIT at 13:03

## 2018-10-01 RX ADMIN — LORATADINE 10 MILLIGRAM(S): 10 TABLET ORAL at 13:03

## 2018-10-01 RX ADMIN — Medication 60 MILLIGRAM(S): at 05:46

## 2018-10-01 RX ADMIN — Medication 1 APPLICATION(S): at 18:15

## 2018-10-01 NOTE — PROVIDER CONTACT NOTE (CHANGE IN STATUS NOTIFICATION) - SITUATION
pt. noted with shortness of breath, low urine output, scrotal edema and generalized edema +4, pt. noted vomiting, pt. noted constipated Mg . Citrate given without results.

## 2018-10-01 NOTE — DIETITIAN INITIAL EVALUATION ADULT. - PROBLEM SELECTOR PLAN 5
Shock liver vs mets to liver vs Bud Chiari   -f/u CT non con (may need CT with contrast, however, will hold off in setting of ROSEMARY   -will order a RUQ liver u/s w/ doppler

## 2018-10-01 NOTE — PROGRESS NOTE ADULT - SUBJECTIVE AND OBJECTIVE BOX
INTERVAL HPI/OVERNIGHT EVENTS:  Patient reports increased LE and scrotal edema and worsening dyspnea.    VITAL SIGNS:  T(F): 97.7 (10-01-18 @ 17:39)  HR: 105 (10-01-18 @ 18:13)  BP: 109/69 (10-01-18 @ 18:13)  RR: 22 (10-01-18 @ 18:13)  SpO2: 92% (10-01-18 @ 18:13)  Wt(kg): --    PHYSICAL EXAM:    Constitutional: tachypneic, ill appearing, resolving facial rash  Eyes: EOMI, sclera non-icteric  Neck: supple, no masses, no JVD  Respiratory: diminished BS b/l  Cardiovascular: RRR, no M/R/G  Gastrointestinal: protuberant, non-tender  Extremities: 3+ LE edema  Neurological: AAOx3      MEDICATIONS  (STANDING):  albumin human 25% IVPB 50 milliLiter(s) IV Intermittent every 6 hours  ampicillin  IVPB 2 Gram(s) IV Intermittent every 4 hours  benzocaine 15 mG/menthol 3.6 mG Lozenge 1 Lozenge Oral every 4 hours  doxazosin 4 milliGRAM(s) Oral at bedtime  enoxaparin Injectable 140 milliGRAM(s) SubCutaneous daily  FIRST- Mouthwash  BLM 15 milliLiter(s) Swish and Spit every 6 hours  furosemide   Injectable 40 milliGRAM(s) IV Push daily  hydrocortisone 2.5% Cream 1 Application(s) Topical two times a day  influenza   Vaccine 0.5 milliLiter(s) IntraMuscular once  loratadine 10 milliGRAM(s) Oral daily  pantoprazole    Tablet 40 milliGRAM(s) Oral before breakfast  polyethylene glycol 3350 17 Gram(s) Oral daily    MEDICATIONS  (PRN):  acetaminophen   Tablet .. 650 milliGRAM(s) Oral every 6 hours PRN Temp greater or equal to 38C (100.4F), Mild Pain (1 - 3), Moderate Pain (4 - 6)  benzonatate 100 milliGRAM(s) Oral three times a day PRN Cough  Biotene Dry Mouth Oral Rinse 5 milliLiter(s) Swish and Spit every 4 hours PRN Mouth Care  methyl salicylate 14%/menthol 6% Topical Ointment 1 Application(s) Topical two times a day PRN pain  Saliva Substitute (CAPHOSOL) 30 milliLiter(s) Swish and Spit two times a day PRN dry mouth  simethicone 80 milliGRAM(s) Chew every 4 hours PRN Gas  sodium chloride 0.65% Nasal 1 Spray(s) Both Nostrils once PRN Nasal Congestion      Allergies    sulfa drugs (Unknown)    Intolerances        LABS:                        10.2   20.4  )-----------( 87       ( 01 Oct 2018 07:23 )             31.9     10-01    137  |  103  |  55<H>  ----------------------------<  148<H>  5.1   |  15<L>  |  1.98<H>    Ca    7.9<L>      01 Oct 2018 17:07    TPro  4.7<L>  /  Alb  1.8<L>  /  TBili  1.1  /  DBili  x   /  AST  311<H>  /  ALT  112<H>  /  AlkPhos  526<H>  10-01          RADIOLOGY & ADDITIONAL TESTS:  Studies reviewed.    ASSESSMENT & PLAN:

## 2018-10-01 NOTE — DIETITIAN INITIAL EVALUATION ADULT. - SOURCE
comprehensive chart review/patient/other (specify) comprehensive chart review, RN/other (specify)/patient

## 2018-10-01 NOTE — PROGRESS NOTE ADULT - ATTENDING COMMENTS
Agree with above. Persistent + blood cultures from Thursday.  Pt with tachypnea, hypoxia, worsening renal failure and leukocytosis.    Recommend Xray, ABG, MICU evaluation. Agree with above. Pt seen and examined at bedside. Persistent + blood cultures from Thursday.  Pt with tachypnea, hypoxia, worsening renal failure and leukocytosis.  + LE Edema and scrotal edema.   Recommend Xray, ABG, MICU evaluation.

## 2018-10-01 NOTE — DIETITIAN INITIAL EVALUATION ADULT. - NUTRITION INTERVENTIONS
Fruited Yogurt, Vanilla Pudding/food preferences as requested by patient (specify)/nutrient dense snacks

## 2018-10-01 NOTE — PROGRESS NOTE ADULT - ASSESSMENT
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  sustained clinical improvement  deconditioned  likely protein malnutrition  Leukocytosis  Given likely immunosuppression, will extend duration of antibiotics to 3 weeks  Ascites  LE edema  Scrotal swelling  Elevated transaminase levels  Repeat Blood cxs NGTD  Afebrile  Leukocytosis, suspect multifactorial given bacteremia, ascites and dyspnea, and received filgrastin x 1   Dypnea on 4L NC    Antibiotics  Cefepime 9/24  Zosyn 9/24 -->25; 9/26 -->27  Vanco 9/24, 9/25, 9/26  Gent 9/27 -->9/30  AMP 9/27 -->    Suggest  Monitor blood cultures  Continue IV Ampicillin --> (at least)  10/15   Ensure Plus, ProStat protein supplementation  If for paracentesis, check cell count with differential and fluid cx  Monitor renal function while on abx  Trend WBC

## 2018-10-01 NOTE — PROGRESS NOTE ADULT - SUBJECTIVE AND OBJECTIVE BOX
Patient is a 68y old  Male who presents with a chief complaint of sepsis (30 Sep 2018 17:35)    INTERVAL HPI/OVERNIGHT EVENTS: No acute events overnight.     SUBJECTIVE: Patient seen and examined at bedside.     CONSTITUTIONAL: No weakness, fevers or chills  EYES/ENT: No visual changes;  No vertigo or throat pain   NECK: No pain or stiffness  RESPIRATORY: No cough, wheezing, hemoptysis; No shortness of breath  CARDIOVASCULAR: No chest pain or palpitations  GASTROINTESTINAL: No abdominal or epigastric pain. No nausea, vomiting, or hematemesis; No diarrhea or constipation. No melena or hematochezia.  GENITOURINARY: No dysuria, frequency or hematuria  NEUROLOGICAL: No numbness or weakness  SKIN: No itching, rashes    OBJECTIVE:    VITAL SIGNS:  ICU Vital Signs Last 24 Hrs  T(C): 36.4 (01 Oct 2018 05:21), Max: 37 (30 Sep 2018 20:20)  T(F): 97.6 (01 Oct 2018 05:21), Max: 98.6 (30 Sep 2018 20:20)  HR: 102 (01 Oct 2018 05:21) (97 - 103)  BP: 100/65 (01 Oct 2018 05:21) (100/65 - 119/79)  RR: 18 (01 Oct 2018 05:21) (18 - 20)  SpO2: 94% (01 Oct 2018 05:21) (94% - 98%)        09-30 @ 07:01  -  10-01 @ 07:00  --------------------------------------------------------  IN: 1510 mL / OUT: 600 mL / NET: 910 mL      CAPILLARY BLOOD GLUCOSE          PHYSICAL EXAM:    General: NAD  HEENT: NC/AT; PERRL, clear conjunctiva  Neck: supple  Respiratory: CTA b/l  Cardiovascular: +S1/S2; RRR  Abdomen: soft, NT/ND; +BS x4  Extremities: WWP, 2+ peripheral pulses b/l; no LE edema  Skin: normal color and turgor; no rash  Neurological: AAOx3    MEDICATIONS:  MEDICATIONS  (STANDING):  ampicillin  IVPB 2 Gram(s) IV Intermittent every 4 hours  benzocaine 15 mG/menthol 3.6 mG Lozenge 1 Lozenge Oral every 4 hours  doxazosin 4 milliGRAM(s) Oral at bedtime  enoxaparin Injectable 140 milliGRAM(s) SubCutaneous daily  FIRST- Mouthwash  BLM 15 milliLiter(s) Swish and Spit every 6 hours  furosemide   Injectable 60 milliGRAM(s) IV Push every 12 hours  hydrocortisone 2.5% Cream 1 Application(s) Topical two times a day  influenza   Vaccine 0.5 milliLiter(s) IntraMuscular once  loratadine 10 milliGRAM(s) Oral daily  pantoprazole    Tablet 40 milliGRAM(s) Oral before breakfast  polyethylene glycol 3350 17 Gram(s) Oral daily    MEDICATIONS  (PRN):  acetaminophen   Tablet .. 650 milliGRAM(s) Oral every 6 hours PRN Temp greater or equal to 38C (100.4F), Mild Pain (1 - 3), Moderate Pain (4 - 6)  benzonatate 100 milliGRAM(s) Oral three times a day PRN Cough  Biotene Dry Mouth Oral Rinse 5 milliLiter(s) Swish and Spit every 4 hours PRN Mouth Care  magnesium citrate Solution 300 milliLiter(s) Oral once PRN Constipation  methyl salicylate 14%/menthol 6% Topical Ointment 1 Application(s) Topical two times a day PRN pain  Saliva Substitute (CAPHOSOL) 30 milliLiter(s) Swish and Spit two times a day PRN dry mouth  simethicone 80 milliGRAM(s) Chew every 4 hours PRN Gas  sodium chloride 0.65% Nasal 1 Spray(s) Both Nostrils once PRN Nasal Congestion      ALLERGIES:  Allergies    sulfa drugs (Unknown)    Intolerances        LABS:                RADIOLOGY & ADDITIONAL TESTS: Reviewed. Patient is a 68y old  Male who presents with a chief complaint of sepsis (30 Sep 2018 17:35)    INTERVAL HPI/OVERNIGHT EVENTS: No acute events overnight.     SUBJECTIVE: Patient seen and examined at bedside. Complaining of continued abdominal distension and scrotal swelling. Has mosley in place. Cough has improved. Still having dry mouth/throat. No fevers, CP, N/V.       OBJECTIVE:    VITAL SIGNS:  ICU Vital Signs Last 24 Hrs  T(C): 36.4 (01 Oct 2018 05:21), Max: 37 (30 Sep 2018 20:20)  T(F): 97.6 (01 Oct 2018 05:21), Max: 98.6 (30 Sep 2018 20:20)  HR: 102 (01 Oct 2018 05:21) (97 - 103)  BP: 100/65 (01 Oct 2018 05:21) (100/65 - 119/79)  RR: 18 (01 Oct 2018 05:21) (18 - 20)  SpO2: 94% (01 Oct 2018 05:21) (94% - 98%)        09-30 @ 07:01  -  10-01 @ 07:00  --------------------------------------------------------  IN: 1510 mL / OUT: 600 mL / NET: 910 mL      CAPILLARY BLOOD GLUCOSE          PHYSICAL EXAM:    General: NAD  HEENT: NC/AT; PERRL, clear conjunctiva  Neck: supple  Respiratory: CTA b/l  Cardiovascular: +S1/S2; RRR  Abdomen: distended, diffusely tender to deep palpation, BS+   Extremities: WWP, 2+ peripheral pulses b/l; 2+ pitting LE edema   Skin: normal color and turgor; facial rash with hydrocortisone cream   Neurological: AAOx3    MEDICATIONS:  MEDICATIONS  (STANDING):  ampicillin  IVPB 2 Gram(s) IV Intermittent every 4 hours  benzocaine 15 mG/menthol 3.6 mG Lozenge 1 Lozenge Oral every 4 hours  doxazosin 4 milliGRAM(s) Oral at bedtime  enoxaparin Injectable 140 milliGRAM(s) SubCutaneous daily  FIRST- Mouthwash  BLM 15 milliLiter(s) Swish and Spit every 6 hours  furosemide   Injectable 60 milliGRAM(s) IV Push every 12 hours  hydrocortisone 2.5% Cream 1 Application(s) Topical two times a day  influenza   Vaccine 0.5 milliLiter(s) IntraMuscular once  loratadine 10 milliGRAM(s) Oral daily  pantoprazole    Tablet 40 milliGRAM(s) Oral before breakfast  polyethylene glycol 3350 17 Gram(s) Oral daily    MEDICATIONS  (PRN):  acetaminophen   Tablet .. 650 milliGRAM(s) Oral every 6 hours PRN Temp greater or equal to 38C (100.4F), Mild Pain (1 - 3), Moderate Pain (4 - 6)  benzonatate 100 milliGRAM(s) Oral three times a day PRN Cough  Biotene Dry Mouth Oral Rinse 5 milliLiter(s) Swish and Spit every 4 hours PRN Mouth Care  magnesium citrate Solution 300 milliLiter(s) Oral once PRN Constipation  methyl salicylate 14%/menthol 6% Topical Ointment 1 Application(s) Topical two times a day PRN pain  Saliva Substitute (CAPHOSOL) 30 milliLiter(s) Swish and Spit two times a day PRN dry mouth  simethicone 80 milliGRAM(s) Chew every 4 hours PRN Gas  sodium chloride 0.65% Nasal 1 Spray(s) Both Nostrils once PRN Nasal Congestion      ALLERGIES:  Allergies    sulfa drugs (Unknown)    Intolerances        LABS:                RADIOLOGY & ADDITIONAL TESTS: Reviewed.

## 2018-10-01 NOTE — PROGRESS NOTE ADULT - SUBJECTIVE AND OBJECTIVE BOX
CC: Patient is a 68y old  Male who presents with a chief complaint of sepsis (01 Oct 2018 07:23)    ID following for Listeria bacteremia, leukocytosis    Interval History/ROS: Patient with dyspnea, on 4L NC, has scrotal swelling, ascites, and LE edema. Denies fever, chills.    Rest of ROS negative.    Allergies  sulfa drugs (Unknown)    ANTIMICROBIALS:  ampicillin  IVPB 2 every 4 hours    PE:    Vital Signs Last 24 Hrs  T(C): 36.5 (01 Oct 2018 14:23), Max: 37 (30 Sep 2018 20:20)  T(F): 97.7 (01 Oct 2018 14:23), Max: 98.6 (30 Sep 2018 20:20)  HR: 102 (01 Oct 2018 14:23) (97 - 105)  BP: 92/65 (01 Oct 2018 14:23) (92/65 - 119/79)  BP(mean): --  RR: 18 (01 Oct 2018 14:23) (18 - 20)  SpO2: 91% (01 Oct 2018 14:23) (91% - 98%)    Gen: AOx3, NAD  CV: S1+S2 normal, no murmurs  Resp: Clear bilat, no resp distress  Abd: Soft, distended, ascites  Ext: +LE edema  : +[place]  IV/Skin: No thrombophlebitis, right mediport intact  Neuro: no focal deficits    LABS:                          10.2   20.4  )-----------( 87       ( 01 Oct 2018 07:23 )             31.9       10-01    135  |  104  |  50<H>  ----------------------------<  124<H>  4.6   |  19<L>  |  1.63<H>    Ca    7.6<L>      01 Oct 2018 07:23    TPro  4.7<L>  /  Alb  1.8<L>  /  TBili  1.1  /  DBili  x   /  AST  311<H>  /  ALT  112<H>  /  AlkPhos  526<H>  10-01          MICROBIOLOGY:  v  .Blood Blood-Peripheral  09-29-18   No growth to date.  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  See previous culture [id number]  --    Growth in aerobic bottle: Gram Positive Rods      .Blood Blood  09-27-18   Growth in aerobic and anaerobic bottles: Listeria monocytogenes  See previous culture [id number]  --    Growth in aerobic bottle: Gram Positive Rods  Growth in anaerobic bottle: Gram Positive Rods      Peritoneal Peritoneal Fluid  09-25-18   No growth at 5 days  --    polymorphonuclear leukocytes seen  No organisms seen by cytocentrifuge      .Blood Blood-Peripheral  09-24-18   Growth in aerobic and anaerobic bottles: Listeria monocytogenes  Resistance to ampicillin or penicillin for Listeria  monocytogenes has not been described. L. monocytogenes is intrinsically  resistant to cephalosporins.  --    Growth in anaerobic bottle: Gram Positive Rods  Growth in aerobic bottle: Gram Positive Rods      .Blood Port Device  09-24-18   Growth in aerobic and anaerobic bottles: Listeria monocytogenes  Resistance to ampicillin or penicillin for Listeria  monocytogenes has not been described. L. monocytogenes is intrinsically  resistant to cephalosporins.  --    Growth in anaerobic bottle: Gram Positive Rods  Growth in aerobic bottle: Gram Positive Rods      .Blood Blood  09-09-18   No growth at 5 days.  --  --    RADIOLOGY:    < from: US Abdomen Upper Quadrant Right (09.25.18 @ 11:52) >  IMPRESSION:     Extensive hepatic metastatic disease.     Patent main portal vein and left portal vein with hepatopedal flow.   Patent left and middle hepatic veins.    The right portal vein and right hepatic vein are not visualized, limited   by marked heterogeneity of the right hepatic lobe secondary to metastatic   disease. 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     Contracted gallbladder. No biliary ductal dilatation.        < end of copied text >

## 2018-10-01 NOTE — CHART NOTE - NSCHARTNOTEFT_GEN_A_CORE
CHIEF COMPLAINT: Patient is a 68y old  Male who presents with a chief complaint of sepsis (01 Oct 2018 18:34)      HPI:  68M pmh metastatic colo ca (currently on chemo, last was 2 weeks ago), HTN, chronic ascitics now presenting to the ED for weakness, worsening cough, fevers to 103 at home. Pt reports that starting Friday, he has been feeling weak and ill. His wife reports that he has been having subjective fevers, which have been getting worse. Pt took his temp on morning of admission which was found to be 103F. Pt's wife gave him 2 tylenol and brought him to the ED. Pt reports that he had a paracentesis performed on 9/20 for therapeutic purposes. Pt reports that his weight has not increased form the time of the therapeutic tap, however, he does feel more bloated. Pt describes that he has been having a chronic cough for over a year, which he reports is 2/2 postnasal drip, for which he tales Claritin. Pt denies night sweats, hemoptysis, n/v/d/c, abd pain, chest pain, pain on urination.     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 Throughout his hospitalization, patient has been experiencing continuously progressive abdominal distention, and worsening renal function. Earlier today, patient began to experinece significant dyspnea, and increased work of breathing. Patient was given 40mg IV Lasix as patient is clinically fluid overloaded. CXR revealed low lung volumes, but no pulmonary edema. Patient was started on Albumin for concern for hepatorenal syndrome. MICU consulted today for dyspnea.       FAMILY HISTORY:  Family history of breast cancer in mother (Mother)      SOCIAL HISTORY:  Smoking: __ packs x ___ years  EtOH Use:  Marital Status:  Occupation:  Recent Travel:  Country of Birth:  Advance Directives:    Allergies    sulfa drugs (Unknown)    Intolerances        HOME MEDICATIONS:      OBJECTIVE:  ICU Vital Signs Last 24 Hrs  T(C): 36.7 (01 Oct 2018 19:02), Max: 36.8 (01 Oct 2018 12:43)  T(F): 98 (01 Oct 2018 19:02), Max: 98.3 (01 Oct 2018 12:43)  HR: 99 (01 Oct 2018 19:02) (99 - 108)  BP: 98/62 (01 Oct 2018 19:02) (92/65 - 110/74)  BP(mean): --  ABP: --  ABP(mean): --  RR: 24 (01 Oct 2018 19:02) (18 - 24)  SpO2: 92% (01 Oct 2018 19:02) (91% - 94%)        09-30 @ 07:01  -  10-01 @ 07:00  --------------------------------------------------------  IN: 1510 mL / OUT: 600 mL / NET: 910 mL    10-01 @ 07:01  -  10-01 @ 21:26  --------------------------------------------------------  IN: 780 mL / OUT: 100 mL / NET: 680 mL      CAPILLARY BLOOD GLUCOSE          PHYSICAL EXAM:  GENERAL: NAD, well-developed  HEAD:  Atraumatic, Normocephalic  EYES: EOMI, PERRLA, conjunctiva and sclera clear  NECK: Supple, No JVD  CHEST/LUNG: Clear to auscultation bilaterally; No wheeze  HEART: Regular rate and rhythm; No murmurs, rubs, or gallops  ABDOMEN: Soft, Nontender, Bowel sounds present. Significant abdominal distention. dullness to percussion  EXTREMITIES:  2+ Peripheral Pulses. 4+ b/l LE pitting edema  PSYCH: AAOx3  NEUROLOGY: non-focal  SKIN: No rashes or lesions      HOSPITAL MEDICATIONS:  MEDICATIONS  (STANDING):  albumin human 25% IVPB 50 milliLiter(s) IV Intermittent every 6 hours  ampicillin  IVPB 2 Gram(s) IV Intermittent every 4 hours  benzocaine 15 mG/menthol 3.6 mG Lozenge 1 Lozenge Oral every 4 hours  doxazosin 4 milliGRAM(s) Oral at bedtime  FIRST- Mouthwash  BLM 15 milliLiter(s) Swish and Spit every 6 hours  furosemide   Injectable 40 milliGRAM(s) IV Push daily  hydrocortisone 2.5% Cream 1 Application(s) Topical two times a day  influenza   Vaccine 0.5 milliLiter(s) IntraMuscular once  loratadine 10 milliGRAM(s) Oral daily  pantoprazole    Tablet 40 milliGRAM(s) Oral before breakfast  polyethylene glycol 3350 17 Gram(s) Oral daily    MEDICATIONS  (PRN):  acetaminophen   Tablet .. 650 milliGRAM(s) Oral every 6 hours PRN Temp greater or equal to 38C (100.4F), Mild Pain (1 - 3), Moderate Pain (4 - 6)  benzonatate 100 milliGRAM(s) Oral three times a day PRN Cough  Biotene Dry Mouth Oral Rinse 5 milliLiter(s) Swish and Spit every 4 hours PRN Mouth Care  methyl salicylate 14%/menthol 6% Topical Ointment 1 Application(s) Topical two times a day PRN pain  Saliva Substitute (CAPHOSOL) 30 milliLiter(s) Swish and Spit two times a day PRN dry mouth  simethicone 80 milliGRAM(s) Chew every 4 hours PRN Gas  sodium chloride 0.65% Nasal 1 Spray(s) Both Nostrils once PRN Nasal Congestion      LABS:                        10.2   20.4  )-----------( 87       ( 01 Oct 2018 07:23 )             31.9     10-01    137  |  103  |  55<H>  ----------------------------<  148<H>  5.1   |  15<L>  |  1.98<H>    Ca    7.9<L>      01 Oct 2018 17:07    TPro  4.7<L>  /  Alb  1.8<L>  /  TBili  1.1  /  DBili  x   /  AST  311<H>  /  ALT  112<H>  /  AlkPhos  526<H>  10-01        Arterial Blood Gas:  10-01 @ 19:35  7.36/22/71/12/94/-11.4  ABG lactate: --    Venous Blood Gas:  10-01 @ 17:07  7.35/30/41/16/67  VBG Lactate: 5.7      MICROBIOLOGY:     RADIOLOGY:  [ ] Reviewed and interpreted by me    EKG:            Assessment and Plan:    Patient is a 67 yo M With PMH of metastatic colon ca (currently on chemo, last was 3 weeks ago), HTN, 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   - currently reporting that his SOB is tolerable, and satting 92% on NC. Given that patient had recent episode of N/V, would avoid BiPAP use.   - patient would likely benefit from a therapeutic paracentesis, however unable to perform one now as patient is anticoagulated with Lovenox   - will d/c Lovenox as patient is planned for therapeutic paracentesis tomorrow  - would caution against removal of a large quantity of peritoneal ascites as patient may have a component of Hepatorenal syndrome  - continue with supplemental O2 as required to maintain SpO2 >92%  - patient has a significantly increasing WBC count, and uptrending LFTs/AlkPhos. Although not the most likely explanation, for his lab abnormalities, there is a concern for SBP. Would broaden abx coverage to empirically treat SBP and obtain a diagnostic paracentesis tomorrow to r/o SBP.    Patient is not a MICU candidate at this time. Please notify us in the event of worsening clinical picture. CHIEF COMPLAINT: Patient is a 68y old  Male who presents with a chief complaint of sepsis (01 Oct 2018 18:34)      HPI:  68M pmh metastatic colo ca (currently on chemo, last was 2 weeks ago), HTN, chronic ascitics now presenting to the ED for weakness, worsening cough, fevers to 103 at home. Pt reports that starting Friday, he has been feeling weak and ill. His wife reports that he has been having subjective fevers, which have been getting worse. Pt took his temp on morning of admission which was found to be 103F. Pt's wife gave him 2 tylenol and brought him to the ED. Pt reports that he had a paracentesis performed on 9/20 for therapeutic purposes. Pt reports that his weight has not increased form the time of the therapeutic tap, however, he does feel more bloated. Pt describes that he has been having a chronic cough for over a year, which he reports is 2/2 postnasal drip, for which he tales Claritin. Pt denies night sweats, hemoptysis, n/v/d/c, abd pain, chest pain, pain on urination.     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 Throughout his hospitalization, patient has been experiencing continuously progressive abdominal distention, and worsening renal function. Earlier today, patient began to experienece significant dyspnea, and increased work of breathing. Patient was given 40mg IV Lasix as patient is clinically fluid overloaded. CXR revealed low lung volumes, but no pulmonary edema. Patient was started on Albumin for concern for hepatorenal syndrome. MICU consulted today for dyspnea.       FAMILY HISTORY:  Family history of breast cancer in mother (Mother)      SOCIAL HISTORY:    Pt works as . Lives at home with wife  Smoke: denies  Drink: denies  Drugs: denies    Allergies  sulfa drugs (Unknown)      OBJECTIVE:  ICU Vital Signs Last 24 Hrs  T(C): 36.7 (01 Oct 2018 19:02), Max: 36.8 (01 Oct 2018 12:43)  T(F): 98 (01 Oct 2018 19:02), Max: 98.3 (01 Oct 2018 12:43)  HR: 99 (01 Oct 2018 19:02) (99 - 108)  BP: 98/62 (01 Oct 2018 19:02) (92/65 - 110/74)  RR: 24 (01 Oct 2018 19:02) (18 - 24)  SpO2: 92% (01 Oct 2018 19:02) (91% - 94%)        09-30 @ 07:01  -  10-01 @ 07:00  --------------------------------------------------------  IN: 1510 mL / OUT: 600 mL / NET: 910 mL    10-01 @ 07:01  -  10-01 @ 21:26  --------------------------------------------------------  IN: 780 mL / OUT: 100 mL / NET: 680 mL      CAPILLARY BLOOD GLUCOSE    PHYSICAL EXAM:  GENERAL: NAD, well-developed  HEAD:  Atraumatic, Normocephalic  EYES: EOMI, PERRLA, conjunctiva and sclera clear  NECK: Supple, No JVD  CHEST/LUNG: Clear to auscultation bilaterally; No wheeze  HEART: Regular rate and rhythm; No murmurs, rubs, or gallops  ABDOMEN: Soft, Nontender, Bowel sounds present. Significant abdominal distention. dullness to percussion  EXTREMITIES:  2+ Peripheral Pulses. 4+ b/l LE pitting edema  PSYCH: AAOx3  NEUROLOGY: non-focal  SKIN: No rashes or lesions      HOSPITAL MEDICATIONS:  MEDICATIONS  (STANDING):  albumin human 25% IVPB 50 milliLiter(s) IV Intermittent every 6 hours  ampicillin  IVPB 2 Gram(s) IV Intermittent every 4 hours  benzocaine 15 mG/menthol 3.6 mG Lozenge 1 Lozenge Oral every 4 hours  doxazosin 4 milliGRAM(s) Oral at bedtime  FIRST- Mouthwash  BLM 15 milliLiter(s) Swish and Spit every 6 hours  furosemide   Injectable 40 milliGRAM(s) IV Push daily  hydrocortisone 2.5% Cream 1 Application(s) Topical two times a day  influenza   Vaccine 0.5 milliLiter(s) IntraMuscular once  loratadine 10 milliGRAM(s) Oral daily  pantoprazole    Tablet 40 milliGRAM(s) Oral before breakfast  polyethylene glycol 3350 17 Gram(s) Oral daily    MEDICATIONS  (PRN):  acetaminophen   Tablet .. 650 milliGRAM(s) Oral every 6 hours PRN Temp greater or equal to 38C (100.4F), Mild Pain (1 - 3), Moderate Pain (4 - 6)  benzonatate 100 milliGRAM(s) Oral three times a day PRN Cough  Biotene Dry Mouth Oral Rinse 5 milliLiter(s) Swish and Spit every 4 hours PRN Mouth Care  methyl salicylate 14%/menthol 6% Topical Ointment 1 Application(s) Topical two times a day PRN pain  Saliva Substitute (CAPHOSOL) 30 milliLiter(s) Swish and Spit two times a day PRN dry mouth  simethicone 80 milliGRAM(s) Chew every 4 hours PRN Gas  sodium chloride 0.65% Nasal 1 Spray(s) Both Nostrils once PRN Nasal Congestion      LABS:                        10.2   20.4  )-----------( 87       ( 01 Oct 2018 07:23 )             31.9     10-01    137  |  103  |  55<H>  ----------------------------<  148<H>  5.1   |  15<L>  |  1.98<H>    Ca    7.9<L>      01 Oct 2018 17:07    TPro  4.7<L>  /  Alb  1.8<L>  /  TBili  1.1  /  DBili  x   /  AST  311<H>  /  ALT  112<H>  /  AlkPhos  526<H>  10-01        Arterial Blood Gas:  10-01 @ 19:35  7.36/22/71/12/94/-11.4  ABG lactate: --    Venous Blood Gas:  10-01 @ 17:07  7.35/30/41/16/67  VBG Lactate: 5.7      MICROBIOLOGY:   BCx+Listeria    RADIOLOGY:  [x ] Reviewed and interpreted by me  < from: Xray Chest 1 View- PORTABLE-Urgent (10.01.18 @ 17:01) >    Findings: Portable frontal view of the chest dated 10/1/2018 is compared   to 9/24/2018. Bibasilar linear atelectasis is improved. Lungs are   otherwise clear. There is no pleural effusion or pneumothorax. Power   injectable right IJ Mediport is unchanged. Heart and mediastinum cannot   be evaluated due to low lung volumes.    < end of copied text >    IMPRESSION:     Extensive hepatic metastatic disease.     Patent main portal vein and left portal vein with hepatopedal flow.   Patent left and middle hepatic veins.    The right portal vein and right hepatic vein are not visualized, limited   by marked heterogeneity of the right hepatic lobe secondary to metastatic   disease. 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     Contracted gallbladder. No biliary ductal dilatation.    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   - currently reporting that his SOB is tolerable, and satting 92% on NC. Given that patient had recent episode of N/V, would avoid BiPAP use if possible.   - patient would likely benefit from a therapeutic paracentesis, however unable to perform one now as patient is anticoagulated with Lovenox   - will d/c Lovenox as patient is planned for therapeutic paracentesis tomorrow  - would caution against removal of a large quantity of peritoneal ascites as patient may have a component of Hepatorenal syndrome  - continue with supplemental O2 as required to maintain SpO2 >92%  - patient has a significantly increasing WBC count, and uptrending LFTs/AlkPhos. Although not the most likely explanation, for his lab abnormalities, there is a concern for SBP. Would broaden abx coverage to empirically treat SBP and obtain a diagnostic paracentesis tomorrow to r/o SBP.    Patient is not a MICU candidate at this time. Please notify us in the event of worsening clinical picture.

## 2018-10-01 NOTE — DIETITIAN INITIAL EVALUATION ADULT. - OTHER INFO
Pt seen for length of stay. Pt seen for length of stay. Pt reports UBW 195lbs, current Wt 206-208lbs. Pt reports complete lack of appetite, drinking 2 ensure enlive shakes daily. Per chart Pt eating % since admission, mostly 25-50% at meals over the past 2 days. C/o constipation, on bowel regimen. Denies nausea/vomiting. No difficulty chewing/swallowing but c/o dry mouth.

## 2018-10-01 NOTE — DIETITIAN INITIAL EVALUATION ADULT. - NS AS NUTRI INTERV MEALS SNACK
General/healthful diet/Encourage po intake w/ snacks ordered at meals. Provide food preferences within diet restrictions as feasible.

## 2018-10-01 NOTE — PROGRESS NOTE ADULT - ASSESSMENT
68M pmh metastatic colon ca (currently on chemo, last was 2 weeks ago), HTN admitted for sepsis, found to be bacteremic with listeria on ampicillin/gentamicin day 5. 68M pmh metastatic colon ca (currently on chemo, last was 2 weeks ago), HTN admitted for sepsis, found to be bacteremic with listeria on ampicillin day 5.

## 2018-10-01 NOTE — PROGRESS NOTE ADULT - PROBLEM SELECTOR PLAN 1
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

## 2018-10-01 NOTE — PROGRESS NOTE ADULT - PROBLEM SELECTOR PLAN 4
2/2 Shock liver vs mets to liver vs [place] [facility]  - CT A/P showing worsening hepatic metastatic disease   - RUQ US showing PVT, started lovenox 140mg daily Likely multifactorial: PVT and mets to liver   - CT A/P showing worsening hepatic metastatic disease   - RUQ US showing PVT, c/w lovenox 140mg daily

## 2018-10-01 NOTE — PROGRESS NOTE ADULT - PROBLEM SELECTOR PLAN 2
- antibiotics for listeria monocytogenes bacteremia per ID  - cultures on 9/27 still positive, repeat cultures on 9/29 NGTD  - port okay to be maintained per infectious disease

## 2018-10-01 NOTE — DIETITIAN INITIAL EVALUATION ADULT. - ENERGY NEEDS
ht: 70 inches, wt: 206.1 pounds, BMI: 29.6 kg/m2, IBW: 166 pounds (+/- 10%), 124 %IBW  Edema: 3+randi leg, scrotum. Skin per nursing documentation: intact.  Other pertinent information:  68M pmh metastatic colon ca (currently on chemo, last was 2 weeks ago), HTN admitted for sepsis, found to be bacteremic with listeria on ampicillin/gentamicin day 5. ht: 70 inches, wt: 206.1 pounds, BMI: 29.6 kg/m2, IBW: 166 pounds (+/- 10%), 124 %IBW  Edema: 3+randi leg, scrotum. Skin per nursing documentation: intact.  Other pertinent information:  68M PMHx metastatic colon ca (currently on chemo, last was 2 weeks ago), HTN admitted for sepsis, found to be bacteremic with listeria on ampicillin/gentamicin day 5.

## 2018-10-01 NOTE — DIETITIAN INITIAL EVALUATION ADULT. - NS AS NUTRI INTERV ED CONTENT
Discussed increased needs for catabolic illness. Nutrition therapy for dry mouth./Nutrition relationship to health/disease/Purpose of the nutrition education

## 2018-10-01 NOTE — DIETITIAN INITIAL EVALUATION ADULT. - PROBLEM SELECTOR PLAN 1
tachypneic, febrile, tachycardic, possible PNA vs SBP as source.  -s/p Vanc/Cefepime and 4.7LNS  -will switch to Vanc/Zosyn for anaerobic coverage  -CXR showing atelectasis bilaterally  -lactate has been clearing with fluids, will continue 50cc/hr overnight  -will order a CT chest, abd, pelvis  -f/u blood and urine clxs

## 2018-10-01 NOTE — CHART NOTE - NSCHARTNOTEFT_GEN_A_CORE
Around 5 pm pt. developed increased work of breathing; at the time pt was on 4L O2 by NC w/ O2 sat of 94%.  CXR, BMP and VBG was ordered.  CXR did not show pulmonary vasc congestion or pleural effusions; VBG showed pCO2 of 30 so pt was kept on NC.  CMP showed a Cr bump from 1.63 to 1.98.  40 IV lasix ordered before BMP resulted and was given at 6:30 pm by nurse as per initial order.     On physical exam pt's abdomen showed increased distension and pt had one episode of emesis. Concern for hepatorenal syndrome and need for paracentesis so MICU was consulted. Around 5 pm pt. developed increased work of breathing; at the time pt was on 4L O2 by NC w/ O2 sat of 94% w/ RR 18-22, BPs soft low 100's systolic.  CXR, BMP and VBG was ordered.  CXR did not show pulmonary vasc congestion or pleural effusions; VBG showed pCO2 of 30 so pt was kept on NC; lactate 5.7.  CMP showed a Cr bump from 1.63 to 1.98.  40 IV lasix ordered before BMP resulted and was given at 6:30 pm by nurse as per initial order. CBC in AM showed 13.5 to 20.4 jump in WBC; afebrile. Pt. AAO x 3 at time.     On physical exam pt's abdomen showed increased distension and pt had one episode of emesis. Concern for hepatorenal syndrome and need for paracentesis so MICU was consulted.

## 2018-10-01 NOTE — PROGRESS NOTE ADULT - PROBLEM SELECTOR PLAN 3
pt with c/o increasing abdominal discomfort  - started lasix for diuresis, mosley placed with lasix 60 mg IVP bid ordered, Cr 1.00  - if abdominal discomfort does not improve w/diuresis then will need a therapeutic paracentesis today pt with c/o increasing abdominal discomfort  - started lasix for diuresis, mosley placed with lasix 60 mg BID yesterday, creatinine elevated today. Will decrease lasix back to 40 qd.   - will give albumin today and possibly tap tomorrow because of worsening renal fx

## 2018-10-01 NOTE — PROGRESS NOTE ADULT - ATTENDING COMMENTS
I have seen and examined the patient in AM with the resident and team. I also discussed with team resident with the update.  metastatic colon cancer/ listeria bacteremia/ SBP / ascites/ anasarca on IV Lasix-   now with worsening creatinine /ROSEMARY in a setting of hypoalbuminemia-   monitor creatinine today, start IV Albumin - monitor I&Os closely/ judicious Lasix/ consider renal consult   Tachypnea-  check CXR, blood gas, consider MICU consult   monitor very closely   d/w onc attending regarding further plan of care. She agrees with above

## 2018-10-01 NOTE — PROGRESS NOTE ADULT - PROBLEM SELECTOR PLAN 1
- patient appears clinically worsened since last week  - suspect this is due to volume overload  - recommend XR or CT chest for further evaluation  - diuresis with albumin  - ABG  - paracentesis per primary team to relieve abdominal distension likely affecting diaphragmatic function

## 2018-10-01 NOTE — DIETITIAN INITIAL EVALUATION ADULT. - PROBLEM SELECTOR PLAN 10
VTE: SCDs in setting of thrombocytopenia  Diet: regular (switch to DASH once BP improves)    KIT Milian MD-[id number]  Internal Medicine Department  Pager: [phone] / [phone]

## 2018-10-02 ENCOUNTER — RESULT REVIEW (OUTPATIENT)
Age: 68
End: 2018-10-02

## 2018-10-02 DIAGNOSIS — N17.9 ACUTE KIDNEY FAILURE, UNSPECIFIED: ICD-10-CM

## 2018-10-02 DIAGNOSIS — I95.9 HYPOTENSION, UNSPECIFIED: ICD-10-CM

## 2018-10-02 LAB
ALBUMIN FLD-MCNC: 0.6 G/DL — SIGNIFICANT CHANGE UP
ALBUMIN SERPL ELPH-MCNC: 2.5 G/DL — LOW (ref 3.3–5)
ALBUMIN SERPL ELPH-MCNC: 2.5 G/DL — LOW (ref 3.3–5)
ALBUMIN SERPL ELPH-MCNC: 2.9 G/DL — LOW (ref 3.3–5)
ALP SERPL-CCNC: 357 U/L — HIGH (ref 40–120)
ALP SERPL-CCNC: 361 U/L — HIGH (ref 40–120)
ALP SERPL-CCNC: 361 U/L — HIGH (ref 40–120)
ALT FLD-CCNC: 108 U/L — HIGH (ref 10–45)
ALT FLD-CCNC: 113 U/L — HIGH (ref 10–45)
ALT FLD-CCNC: 161 U/L — HIGH (ref 10–45)
ANION GAP SERPL CALC-SCNC: 21 MMOL/L — HIGH (ref 5–17)
ANION GAP SERPL CALC-SCNC: 22 MMOL/L — HIGH (ref 5–17)
ANION GAP SERPL CALC-SCNC: 25 MMOL/L — HIGH (ref 5–17)
APTT BLD: 63.6 SEC — HIGH (ref 27.5–37.4)
APTT BLD: 85.7 SEC — HIGH (ref 27.5–37.4)
AST SERPL-CCNC: 401 U/L — HIGH (ref 10–40)
AST SERPL-CCNC: 449 U/L — HIGH (ref 10–40)
AST SERPL-CCNC: 827 U/L — HIGH (ref 10–40)
B PERT IGG+IGM PNL SER: ABNORMAL
BASOPHILS # BLD AUTO: 0 K/UL — SIGNIFICANT CHANGE UP (ref 0–0.2)
BILIRUB SERPL-MCNC: 1.4 MG/DL — HIGH (ref 0.2–1.2)
BILIRUB SERPL-MCNC: 1.4 MG/DL — HIGH (ref 0.2–1.2)
BILIRUB SERPL-MCNC: 1.6 MG/DL — HIGH (ref 0.2–1.2)
BUN SERPL-MCNC: 62 MG/DL — HIGH (ref 7–23)
BUN SERPL-MCNC: 64 MG/DL — HIGH (ref 7–23)
BUN SERPL-MCNC: 66 MG/DL — HIGH (ref 7–23)
CALCIUM SERPL-MCNC: 8.1 MG/DL — LOW (ref 8.4–10.5)
CALCIUM SERPL-MCNC: 8.3 MG/DL — LOW (ref 8.4–10.5)
CALCIUM SERPL-MCNC: 8.3 MG/DL — LOW (ref 8.4–10.5)
CHLORIDE SERPL-SCNC: 102 MMOL/L — SIGNIFICANT CHANGE UP (ref 96–108)
CHLORIDE SERPL-SCNC: 102 MMOL/L — SIGNIFICANT CHANGE UP (ref 96–108)
CHLORIDE SERPL-SCNC: 104 MMOL/L — SIGNIFICANT CHANGE UP (ref 96–108)
CO2 SERPL-SCNC: 11 MMOL/L — LOW (ref 22–31)
CO2 SERPL-SCNC: 12 MMOL/L — LOW (ref 22–31)
CO2 SERPL-SCNC: 13 MMOL/L — LOW (ref 22–31)
COLOR FLD: YELLOW — SIGNIFICANT CHANGE UP
CREAT SERPL-MCNC: 2.58 MG/DL — HIGH (ref 0.5–1.3)
CREAT SERPL-MCNC: 2.71 MG/DL — HIGH (ref 0.5–1.3)
CREAT SERPL-MCNC: 2.92 MG/DL — HIGH (ref 0.5–1.3)
EOSINOPHIL # BLD AUTO: 0.1 K/UL — SIGNIFICANT CHANGE UP (ref 0–0.5)
FLUID INTAKE SUBSTANCE CLASS: SIGNIFICANT CHANGE UP
FLUID SEGMENTED GRANULOCYTES: 52 % — SIGNIFICANT CHANGE UP
GAS PNL BLDA: SIGNIFICANT CHANGE UP
GAS PNL BLDV: SIGNIFICANT CHANGE UP
GLUCOSE FLD-MCNC: 147 MG/DL — SIGNIFICANT CHANGE UP
GLUCOSE SERPL-MCNC: 132 MG/DL — HIGH (ref 70–99)
GLUCOSE SERPL-MCNC: 138 MG/DL — HIGH (ref 70–99)
GLUCOSE SERPL-MCNC: 152 MG/DL — HIGH (ref 70–99)
GRAM STN FLD: SIGNIFICANT CHANGE UP
HCT VFR BLD CALC: 29.9 % — LOW (ref 39–50)
HCT VFR BLD CALC: 30.1 % — LOW (ref 39–50)
HCT VFR BLD CALC: 30.3 % — LOW (ref 39–50)
HGB BLD-MCNC: 8.9 G/DL — LOW (ref 13–17)
HGB BLD-MCNC: 9 G/DL — LOW (ref 13–17)
HGB BLD-MCNC: 9.3 G/DL — LOW (ref 13–17)
INR BLD: 1.74 RATIO — HIGH (ref 0.88–1.16)
INR BLD: 1.83 RATIO — HIGH (ref 0.88–1.16)
INR BLD: 1.89 RATIO — HIGH (ref 0.88–1.16)
LDH SERPL L TO P-CCNC: 332 U/L — SIGNIFICANT CHANGE UP
LYMPHOCYTES # BLD AUTO: 1.6 K/UL — SIGNIFICANT CHANGE UP (ref 1–3.3)
LYMPHOCYTES # BLD AUTO: 5 % — LOW (ref 13–44)
LYMPHOCYTES # FLD: 15 % — SIGNIFICANT CHANGE UP
MAGNESIUM SERPL-MCNC: 2.7 MG/DL — HIGH (ref 1.6–2.6)
MAGNESIUM SERPL-MCNC: 2.8 MG/DL — HIGH (ref 1.6–2.6)
MAGNESIUM SERPL-MCNC: 2.9 MG/DL — HIGH (ref 1.6–2.6)
MCHC RBC-ENTMCNC: 27.4 PG — SIGNIFICANT CHANGE UP (ref 27–34)
MCHC RBC-ENTMCNC: 28 PG — SIGNIFICANT CHANGE UP (ref 27–34)
MCHC RBC-ENTMCNC: 28.5 PG — SIGNIFICANT CHANGE UP (ref 27–34)
MCHC RBC-ENTMCNC: 29.7 GM/DL — LOW (ref 32–36)
MCHC RBC-ENTMCNC: 30.3 GM/DL — LOW (ref 32–36)
MCHC RBC-ENTMCNC: 30.8 GM/DL — LOW (ref 32–36)
MCV RBC AUTO: 92.2 FL — SIGNIFICANT CHANGE UP (ref 80–100)
MCV RBC AUTO: 92.4 FL — SIGNIFICANT CHANGE UP (ref 80–100)
MCV RBC AUTO: 92.7 FL — SIGNIFICANT CHANGE UP (ref 80–100)
MESOTHL CELL # FLD: 7 % — SIGNIFICANT CHANGE UP
MONOCYTES # BLD AUTO: 1.8 K/UL — HIGH (ref 0–0.9)
MONOCYTES NFR BLD AUTO: 9 % — SIGNIFICANT CHANGE UP (ref 2–14)
MONOS+MACROS # FLD: 23 % — SIGNIFICANT CHANGE UP
NEUTROPHILS # BLD AUTO: 22.8 K/UL — HIGH (ref 1.8–7.4)
NEUTROPHILS NFR BLD AUTO: 76 % — SIGNIFICANT CHANGE UP (ref 43–77)
OTHER CELLS FLD MANUAL: 3 % — SIGNIFICANT CHANGE UP
PHOSPHATE SERPL-MCNC: 6.9 MG/DL — HIGH (ref 2.5–4.5)
PHOSPHATE SERPL-MCNC: 7.5 MG/DL — HIGH (ref 2.5–4.5)
PHOSPHATE SERPL-MCNC: 8 MG/DL — HIGH (ref 2.5–4.5)
PLATELET # BLD AUTO: 109 K/UL — LOW (ref 150–400)
PLATELET # BLD AUTO: 117 K/UL — LOW (ref 150–400)
PLATELET # BLD AUTO: 92 K/UL — LOW (ref 150–400)
POTASSIUM SERPL-MCNC: 5.6 MMOL/L — HIGH (ref 3.5–5.3)
POTASSIUM SERPL-MCNC: 5.7 MMOL/L — HIGH (ref 3.5–5.3)
POTASSIUM SERPL-MCNC: 5.9 MMOL/L — HIGH (ref 3.5–5.3)
POTASSIUM SERPL-SCNC: 5.6 MMOL/L — HIGH (ref 3.5–5.3)
POTASSIUM SERPL-SCNC: 5.7 MMOL/L — HIGH (ref 3.5–5.3)
POTASSIUM SERPL-SCNC: 5.9 MMOL/L — HIGH (ref 3.5–5.3)
PROT FLD-MCNC: 1.5 G/DL — SIGNIFICANT CHANGE UP
PROT SERPL-MCNC: 4.9 G/DL — LOW (ref 6–8.3)
PROT SERPL-MCNC: 4.9 G/DL — LOW (ref 6–8.3)
PROT SERPL-MCNC: 5.1 G/DL — LOW (ref 6–8.3)
PROTHROM AB SERPL-ACNC: 19.2 SEC — HIGH (ref 9.8–12.7)
PROTHROM AB SERPL-ACNC: 20.2 SEC — HIGH (ref 9.8–12.7)
PROTHROM AB SERPL-ACNC: 20.7 SEC — HIGH (ref 9.8–12.7)
RBC # BLD: 3.22 M/UL — LOW (ref 4.2–5.8)
RBC # BLD: 3.26 M/UL — LOW (ref 4.2–5.8)
RBC # BLD: 3.28 M/UL — LOW (ref 4.2–5.8)
RBC # FLD: 21 % — HIGH (ref 10.3–14.5)
RBC # FLD: 21.9 % — HIGH (ref 10.3–14.5)
RBC # FLD: 22.7 % — HIGH (ref 10.3–14.5)
RCV VOL RI: 150 /UL — HIGH (ref 0–5)
SODIUM SERPL-SCNC: 135 MMOL/L — SIGNIFICANT CHANGE UP (ref 135–145)
SODIUM SERPL-SCNC: 138 MMOL/L — SIGNIFICANT CHANGE UP (ref 135–145)
SODIUM SERPL-SCNC: 139 MMOL/L — SIGNIFICANT CHANGE UP (ref 135–145)
SPECIMEN SOURCE FLD: SIGNIFICANT CHANGE UP
SPECIMEN SOURCE: SIGNIFICANT CHANGE UP
TOTAL NUCLEATED CELL COUNT, BODY FLUID: 231 /UL — HIGH (ref 0–5)
TUBE TYPE: SIGNIFICANT CHANGE UP
VANCOMYCIN FLD-MCNC: 13.7 UG/ML — SIGNIFICANT CHANGE UP
WBC # BLD: 30.1 K/UL — HIGH (ref 3.8–10.5)
WBC # BLD: 31.3 K/UL — HIGH (ref 3.8–10.5)
WBC # BLD: 32.8 K/UL — HIGH (ref 3.8–10.5)
WBC # FLD AUTO: 30.1 K/UL — HIGH (ref 3.8–10.5)
WBC # FLD AUTO: 31.3 K/UL — HIGH (ref 3.8–10.5)
WBC # FLD AUTO: 32.8 K/UL — HIGH (ref 3.8–10.5)

## 2018-10-02 PROCEDURE — 99291 CRITICAL CARE FIRST HOUR: CPT | Mod: 25

## 2018-10-02 PROCEDURE — 88112 CYTOPATH CELL ENHANCE TECH: CPT | Mod: 26

## 2018-10-02 PROCEDURE — 36569 INSJ PICC 5 YR+ W/O IMAGING: CPT | Mod: GC

## 2018-10-02 PROCEDURE — 99233 SBSQ HOSP IP/OBS HIGH 50: CPT

## 2018-10-02 PROCEDURE — 88108 CYTOPATH CONCENTRATE TECH: CPT | Mod: 26,59

## 2018-10-02 PROCEDURE — 99223 1ST HOSP IP/OBS HIGH 75: CPT | Mod: GC

## 2018-10-02 PROCEDURE — 71045 X-RAY EXAM CHEST 1 VIEW: CPT | Mod: 26

## 2018-10-02 PROCEDURE — 88341 IMHCHEM/IMCYTCHM EA ADD ANTB: CPT | Mod: 26

## 2018-10-02 PROCEDURE — 88342 IMHCHEM/IMCYTCHM 1ST ANTB: CPT | Mod: 26

## 2018-10-02 PROCEDURE — 88305 TISSUE EXAM BY PATHOLOGIST: CPT | Mod: 26

## 2018-10-02 PROCEDURE — 99232 SBSQ HOSP IP/OBS MODERATE 35: CPT | Mod: GC

## 2018-10-02 PROCEDURE — 99233 SBSQ HOSP IP/OBS HIGH 50: CPT | Mod: GC

## 2018-10-02 RX ORDER — HEPARIN SODIUM 5000 [USP'U]/ML
1200 INJECTION INTRAVENOUS; SUBCUTANEOUS
Qty: 25000 | Refills: 0 | Status: DISCONTINUED | OUTPATIENT
Start: 2018-10-02 | End: 2018-10-03

## 2018-10-02 RX ORDER — DEXTROSE 50 % IN WATER 50 %
50 SYRINGE (ML) INTRAVENOUS ONCE
Qty: 0 | Refills: 0 | Status: COMPLETED | OUTPATIENT
Start: 2018-10-02 | End: 2018-10-02

## 2018-10-02 RX ORDER — PANTOPRAZOLE SODIUM 20 MG/1
40 TABLET, DELAYED RELEASE ORAL DAILY
Qty: 0 | Refills: 0 | Status: DISCONTINUED | OUTPATIENT
Start: 2018-10-02 | End: 2018-10-03

## 2018-10-02 RX ORDER — INSULIN HUMAN 100 [IU]/ML
5 INJECTION, SOLUTION SUBCUTANEOUS ONCE
Qty: 0 | Refills: 0 | Status: COMPLETED | OUTPATIENT
Start: 2018-10-02 | End: 2018-10-02

## 2018-10-02 RX ORDER — PHENYLEPHRINE HYDROCHLORIDE 10 MG/ML
2 INJECTION INTRAVENOUS
Qty: 40 | Refills: 0 | Status: DISCONTINUED | OUTPATIENT
Start: 2018-10-02 | End: 2018-10-03

## 2018-10-02 RX ORDER — ALBUMIN HUMAN 25 %
250 VIAL (ML) INTRAVENOUS ONCE
Qty: 0 | Refills: 0 | Status: DISCONTINUED | OUTPATIENT
Start: 2018-10-02 | End: 2018-10-02

## 2018-10-02 RX ORDER — SODIUM BICARBONATE 1 MEQ/ML
50 SYRINGE (ML) INTRAVENOUS
Qty: 0 | Refills: 0 | Status: COMPLETED | OUTPATIENT
Start: 2018-10-02 | End: 2018-10-02

## 2018-10-02 RX ORDER — MIDODRINE HYDROCHLORIDE 2.5 MG/1
10 TABLET ORAL ONCE
Qty: 0 | Refills: 0 | Status: DISCONTINUED | OUTPATIENT
Start: 2018-10-02 | End: 2018-10-02

## 2018-10-02 RX ORDER — ALBUMIN HUMAN 25 %
100 VIAL (ML) INTRAVENOUS EVERY 8 HOURS
Qty: 0 | Refills: 0 | Status: DISCONTINUED | OUTPATIENT
Start: 2018-10-02 | End: 2018-10-02

## 2018-10-02 RX ORDER — NOREPINEPHRINE BITARTRATE/D5W 8 MG/250ML
0.05 PLASTIC BAG, INJECTION (ML) INTRAVENOUS
Qty: 8 | Refills: 0 | Status: DISCONTINUED | OUTPATIENT
Start: 2018-10-02 | End: 2018-10-02

## 2018-10-02 RX ORDER — CALCIUM GLUCONATE 100 MG/ML
2 VIAL (ML) INTRAVENOUS ONCE
Qty: 0 | Refills: 0 | Status: COMPLETED | OUTPATIENT
Start: 2018-10-02 | End: 2018-10-02

## 2018-10-02 RX ORDER — INSULIN HUMAN 100 [IU]/ML
5 INJECTION, SOLUTION SUBCUTANEOUS ONCE
Qty: 0 | Refills: 0 | Status: DISCONTINUED | OUTPATIENT
Start: 2018-10-02 | End: 2018-10-02

## 2018-10-02 RX ORDER — HEPARIN SODIUM 5000 [USP'U]/ML
3500 INJECTION INTRAVENOUS; SUBCUTANEOUS EVERY 6 HOURS
Qty: 0 | Refills: 0 | Status: DISCONTINUED | OUTPATIENT
Start: 2018-10-02 | End: 2018-10-03

## 2018-10-02 RX ORDER — NOREPINEPHRINE BITARTRATE/D5W 8 MG/250ML
0.05 PLASTIC BAG, INJECTION (ML) INTRAVENOUS
Qty: 16 | Refills: 0 | Status: DISCONTINUED | OUTPATIENT
Start: 2018-10-02 | End: 2018-10-03

## 2018-10-02 RX ORDER — HEPARIN SODIUM 5000 [USP'U]/ML
7500 INJECTION INTRAVENOUS; SUBCUTANEOUS EVERY 6 HOURS
Qty: 0 | Refills: 0 | Status: DISCONTINUED | OUTPATIENT
Start: 2018-10-02 | End: 2018-10-03

## 2018-10-02 RX ORDER — ALBUMIN HUMAN 25 %
100 VIAL (ML) INTRAVENOUS EVERY 6 HOURS
Qty: 0 | Refills: 0 | Status: DISCONTINUED | OUTPATIENT
Start: 2018-10-02 | End: 2018-10-03

## 2018-10-02 RX ADMIN — DIPHENHYDRAMINE HYDROCHLORIDE AND LIDOCAINE HYDROCHLORIDE AND ALUMINUM HYDROXIDE AND MAGNESIUM HYDRO 15 MILLILITER(S): KIT at 05:14

## 2018-10-02 RX ADMIN — PHENYLEPHRINE HYDROCHLORIDE 70.12 MICROGRAM(S)/KG/MIN: 10 INJECTION INTRAVENOUS at 10:39

## 2018-10-02 RX ADMIN — BENZOCAINE AND MENTHOL 1 LOZENGE: 5; 1 LIQUID ORAL at 05:07

## 2018-10-02 RX ADMIN — PANTOPRAZOLE SODIUM 40 MILLIGRAM(S): 20 TABLET, DELAYED RELEASE ORAL at 18:38

## 2018-10-02 RX ADMIN — MEROPENEM 100 MILLIGRAM(S): 1 INJECTION INTRAVENOUS at 18:37

## 2018-10-02 RX ADMIN — HEPARIN SODIUM 1200 UNIT(S)/HR: 5000 INJECTION INTRAVENOUS; SUBCUTANEOUS at 23:10

## 2018-10-02 RX ADMIN — INSULIN HUMAN 5 UNIT(S): 100 INJECTION, SOLUTION SUBCUTANEOUS at 18:34

## 2018-10-02 RX ADMIN — Medication 50 MILLILITER(S): at 11:50

## 2018-10-02 RX ADMIN — Medication 100 MILLILITER(S): at 23:49

## 2018-10-02 RX ADMIN — Medication 100 MILLILITER(S): at 19:06

## 2018-10-02 RX ADMIN — Medication 8.77 MICROGRAM(S)/KG/MIN: at 10:39

## 2018-10-02 RX ADMIN — Medication 8.77 MICROGRAM(S)/KG/MIN: at 23:48

## 2018-10-02 RX ADMIN — Medication 50 MILLILITER(S): at 03:53

## 2018-10-02 RX ADMIN — Medication 30 MILLILITER(S): at 05:07

## 2018-10-02 RX ADMIN — MEROPENEM 100 MILLIGRAM(S): 1 INJECTION INTRAVENOUS at 10:41

## 2018-10-02 RX ADMIN — Medication 50 MILLIEQUIVALENT(S): at 11:50

## 2018-10-02 RX ADMIN — INSULIN HUMAN 5 UNIT(S): 100 INJECTION, SOLUTION SUBCUTANEOUS at 11:50

## 2018-10-02 RX ADMIN — Medication 8.77 MICROGRAM(S)/KG/MIN: at 21:23

## 2018-10-02 RX ADMIN — Medication 1 APPLICATION(S): at 05:07

## 2018-10-02 RX ADMIN — Medication 100 MILLILITER(S): at 10:22

## 2018-10-02 RX ADMIN — Medication 50 MILLILITER(S): at 18:29

## 2018-10-02 RX ADMIN — Medication 50 MILLIEQUIVALENT(S): at 12:40

## 2018-10-02 RX ADMIN — PANTOPRAZOLE SODIUM 40 MILLIGRAM(S): 20 TABLET, DELAYED RELEASE ORAL at 05:29

## 2018-10-02 RX ADMIN — Medication 200 GRAM(S): at 12:07

## 2018-10-02 NOTE — PROGRESS NOTE ADULT - PROBLEM SELECTOR PLAN 2
- would consult nephrology  - being treated empirically for hepatorenal syndrome on pressors  - may need HD if continued volume overload, hyperkalemia, uremia and worsening acidosis refractory to medical therapy

## 2018-10-02 NOTE — PROGRESS NOTE ADULT - ATTENDING COMMENTS
I have seen and examined the patient in AM with the resident and team. I also discussed with team resident with the update.  metastatic colon cancer/ listeria bacteremia/ SBP / ascites/ anasarca    now with ROSEMARY/ tachypnea/ respiratory alkalosis/ Hypotension. MICU and RRT team at bedside. d/w team and plan to transfer to MICU.  Cont. IV  Abx.

## 2018-10-02 NOTE — PROGRESS NOTE ADULT - PROBLEM SELECTOR PLAN 2
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 Lasix held for hypotension.   - was given albumin yesterday, will tap today - diagnostic and therapeutic Patient with chronic ascites, diagnostic tap initially in ED negative for SBP but suspicious for malignant cells   - creatinine elevated 1->1.5->2. Lasix held for hypotension.   - was given albumin yesterday, was planned for tap today but with hypotension to systolic 60s, MICU and RRT was called

## 2018-10-02 NOTE — PROGRESS NOTE ADULT - PROBLEM SELECTOR PLAN 8
will hold antihypertensives in setting of sepsis and hypotension  - can restart meds if indicated Pt's last chemo was 2 weeks ago, will be on hold for now given bacteremia  - f/u Onc recs, s/p 1x zarxio for neutropenia  - hydrocortisone cream for facial rash

## 2018-10-02 NOTE — PROCEDURE NOTE - NSSITEPREP_SKIN_A_CORE
chlorhexidine
chlorhexidine/Adherence to aseptic technique: hand hygiene prior to donning barriers (gown, gloves), don cap and mask, sterile drape over patient
chlorhexidine/Adherence to aseptic technique: hand hygiene prior to donning barriers (gown, gloves), don cap and mask, sterile drape over patient

## 2018-10-02 NOTE — PROVIDER CONTACT NOTE (OTHER) - ACTION/TREATMENT ORDERED:
MD aware and states no intervention needed. As per MD ascites can cause a misreading on BS. Will continue to monitor pt output closely.
hold lasix
Straight cath ordered. MD Cordero made aware only 100 cc of urine was able to be removed from bladder. RN felt resistance and pt c/o pain.  As per MD Cordero no need to straight cath second time.

## 2018-10-02 NOTE — PROGRESS NOTE ADULT - PROBLEM SELECTOR PLAN 4
With transaminitis  - RUQ US showing PVT, on lovenox 140mg daily  - monitor for bleeding  - holding lovenox for paracentesis Creatinine increasing the past day from 1->->2 with concern for hepatorenal  - will do paracentesis today and monitor creatinine Creatinine increasing the past day from 1->->2 with concern for hepatorenal  - in acute renal failure now, will continue to monitor BMP

## 2018-10-02 NOTE — PROGRESS NOTE ADULT - PROBLEM SELECTOR PLAN 10
VTE: on lovenox (hold for today)  Diet: regular   Dispo: home with home PT, needs shower chair and rolling walker

## 2018-10-02 NOTE — PROVIDER CONTACT NOTE (OTHER) - ASSESSMENT
Pt c/o hesitancy-bladder scanned performed w/250ml. Pt abdomen is distended s/c to ascites.
PT Alert and oriented, Abdomen is distended and scrotum edematous.  All VS WNL, Pt states he has urges to urinate but cannot empty his bladder fully.
pt alert and oriented, low BP
pt ax0-4, see flow sheet for v/s

## 2018-10-02 NOTE — CHART NOTE - NSCHARTNOTEFT_GEN_A_CORE
RRT called for hypotension (60-70's/30-40's) in setting of worsening respiratory distress.  Pt mentating throughout entire rapid, MICU called to bedside. He was placed on NRB prior to RRT for worsening tachypnea. Decision was made to begin patient on vasopressor support and admit to MICU for diagnostic/therapeutic paracentesis to r/o SBP. Family notified, pt transferred w/ ICU resident and nurse.       Kal Tomlinson MD  Internal Medicine Resident, PGY-2

## 2018-10-02 NOTE — PROGRESS NOTE ADULT - ASSESSMENT
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

## 2018-10-02 NOTE — PROGRESS NOTE ADULT - ATTENDING COMMENTS
Agree with above.   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 Overall prognosis is poor.   We attempted to discuss the gravity of the situation with wife and pt. However wife is remaining hopeful.  Will cont GOC discussions as we evaluate pt's clinical situation.

## 2018-10-02 NOTE — CONSULT NOTE ADULT - SUBJECTIVE AND OBJECTIVE BOX
HPI:  68M pmh metastatic colon cancer with mets to liver, lung and peritoneum (currently on chemo, last was 2 weeks ago), HTN, chronic ascites presented to the ED for weakness, worsening cough, fevers to 103 at home. Pt reports that starting Friday, he had been feeling weak and ill. His wife reports that he has been having subjective fevers, which have been getting worse. Pt took his temp on morning of admission which was found to be 103F. Pt's wife gave him 2 tylenol and brought him to the ED. Pt reported that he had a paracentesis performed on 9/20 for therapeutic purposes. Pt reports that his weight has not increased from the time of the therapeutic tap, however, he does feel more bloated. Pt describes that he has been having a chronic cough for over a year, which he reports is 2/2 postnasal drip, for which he takes Claritin. Pt denied night sweats, hemoptysis, n/v/d/c, abd pain, chest pain, pain on urination at the time of admission.     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 Patient was transferred to MICU 10/2. Palliative consulted for goals of care.     PERTINENT PM/SXH:   Malignant neoplasm of colon, unspecified part of colon  HTN (hypertension)    History of tonsillectomy    FAMILY HISTORY:  Family history of breast cancer in mother (Mother)    ITEMS NOT CHECKED ARE NOT PRESENT    SOCIAL HISTORY:   Significant other/partner:  [ ]  Children:  [x ]  Advent/Spirituality: Mormon   Substance hx:  [ ]   Tobacco hx:  [ ]   Alcohol hx: [ ]   Home Opioid hx:  [ ] I-Stop Reference No:  Living Situation: [x ]Home  [ ]Long term care  [ ]Rehab [ ]Other    ADVANCE DIRECTIVES:    DNR  MOLST  [ ]  Living Will  [ ]   DECISION MAKER(s):  [ ] Health Care Proxy(s)  [x ] Surrogate(s)  [ ] Guardian           Name(s): Phone Number(s): Herman Barrera [phone]    BASELINE (I)ADL(s) (prior to admission):  [place]: [ ]Total  [ x] Moderate [ ]Dependent    Allergies    sulfa drugs (Unknown)    Intolerances    MEDICATIONS  (STANDING):  albumin human 25% IVPB 100 milliLiter(s) IV Intermittent every 6 hours  meropenem  IVPB 1000 milliGRAM(s) IV Intermittent every 12 hours  meropenem  IVPB      norepinephrine Infusion 0.05 MICROgram(s)/kG/Min (8.766 mL/Hr) IV Continuous <Continuous>  pantoprazole  Injectable 40 milliGRAM(s) IV Push daily  phenylephrine    Infusion 2 MICROgram(s)/kG/Min (70.125 mL/Hr) IV Continuous <Continuous>    MEDICATIONS  (PRN):    PRESENT SYMPTOMS: [x ]Unable to obtain due to poor mentation   Source if other than patient:  [ ]Family   [ ]Team     Pain (Impact on QOL):    Location -         Minimal acceptable level (0-10 scale):                    Aggrevating factors -  Quality -  Radiation -  Severity (0-10 scale) -    Timing -    PAIN AD Score:     Dyspnea:                           [ ]Mild [ ]Moderate [ ]Severe  Anxiety:                             [ ]Mild [ ]Moderate [ ]Severe  Fatigue:                             [ ]Mild [ ]Moderate [ ]Severe  Nausea:                             [ ]Mild [ ]Moderate [ ]Severe  Loss of appetite:              [ ]Mild [ ]Moderate [ ]Severe  Constipation:                    [ ]Mild [ ]Moderate [ ]Severe    Other Symptoms:  [ ]All other review of systems negative     Karnofsky Performance Score/Palliative Performance Status Version 2:         %  PHYSICAL EXAM:  Vital Signs Last 24 Hrs  T(C): 36.2 (02 Oct 2018 13:00), Max: 36.7 (01 Oct 2018 19:02)  T(F): 97.1 (02 Oct 2018 13:00), Max: 98 (01 Oct 2018 19:02)  HR: 96 (02 Oct 2018 14:45) (85 - 106)  BP: 95/56 (02 Oct 2018 14:45) (70/39 - 123/55)  BP(mean): 70 (02 Oct 2018 14:45) (67 - 84)  RR: 25 (02 Oct 2018 14:45) (18 - 30)  SpO2: 100% (02 Oct 2018 14:45) (92% - 100%) I&O's Summary    01 Oct 2018 07:01  -  02 Oct 2018 07:00  --------------------------------------------------------  IN: 1330 mL / OUT: 200 mL / NET: 1130 mL    02 Oct 2018 07:01  -  02 Oct 2018 15:24  --------------------------------------------------------  IN: 386.3 mL / OUT: 5 mL / NET: 381.3 mL    GENERAL:  [ ]Alert  [ ]Oriented x   [ ]Lethargic  [ ]Cachexia  [ ]Unarousable  [ ]Verbal  [ ]Non-Verbal  Behavioral:   [ ] Anxiety  [ ] Delirium [ ] Agitation [ ] Other  HEENT:  [ ]Normal   [ ]Dry mouth   [ ]ET Tube/Trach  [ ]Oral lesions  PULMONARY:   [ ]Clear [ ]Tachypnea  [ ]Audible excessive secretions   [ ]Rhonchi        [ ]Right [ ]Left [ ]Bilateral  [ ]Crackles        [ ]Right [ ]Left [ ]Bilateral  [ ]Wheezing     [ ]Right [ ]Left [ ]Bilateral  CARDIOVASCULAR:    [ ]Regular [ ]Irregular [ ]Tachy  [ ]Julius [ ]Murmur [ ]Other  GASTROINTESTINAL:  [ ]Soft  [ ]Distended   [ ]+BS  [ ]Non tender [ ]Tender  [ ]PEG [ ]OGT/ NGT  Last BM:   GENITOURINARY:  [ ]Normal [ ] Incontinent   [ ]Oliguria/Anuria   [ ]Crawford  MUSCULOSKELETAL:   [ ]Normal   [ ]Weakness  [ ]Bed/Wheelchair bound [ ]Edema  NEUROLOGIC:   [ ]No focal deficits  [ ] Cognitive impairment  [ ] Dysphagia [ ]Dysarthria [ ] Paresis [ ]Other   SKIN:   [ ]Normal   [ ]Pressure ulcer(s)  [ ]Rash    CRITICAL CARE:  [ ] Shock Present  [ ]Septic [ ]Cardiogenic [ ]Neurologic [ ]Hypovolemic  [ ]  Vasopressors [ ]  Inotropes   [ ] Respiratory failure present  [ ] Acute  [ ] Chronic [ ] Hypoxic  [ ] Hypercarbic [ ] Other  [ ] Other organ failure     LABS:                        9.0    32.8  )-----------( 109      ( 02 Oct 2018 10:42 )             29.9   10-02    138  |  102  |  64<H>  ----------------------------<  132<H>  5.7<H>   |  11<L>  |  2.71<H>    Ca    8.1<L>      02 Oct 2018 10:42  Phos  8.0     10-02  Mg     2.8     10-02    TPro  4.9<L>  /  Alb  2.5<L>  /  TBili  1.4<H>  /  DBili  x   /  AST  449<H>  /  ALT  113<H>  /  AlkPhos  357<H>  10-02  PT/INR - ( 02 Oct 2018 10:42 )   PT: 20.2 sec;   INR: 1.83 ratio         PTT - ( 02 Oct 2018 10:42 )  PTT:85.7 sec      RADIOLOGY & ADDITIONAL STUDIES:    PROTEIN CALORIE MALNUTRITION:   [ ] PPSV2 < or = to 30% [ ] significant weight loss  [ ] poor nutritional intake [ ] catabolic state [ ] anasarca     Albumin, Serum: 2.5 g/dL (10-02-18 @ 10:42)  Artificial Nutrition [ ]     REFERRALS:   [ ]Chaplaincy  [ ] Hospice  [ ]Child Life  [ ]Social Work  [ ]Case management [ ]Holistic Therapy   Goals of Care Discussion Document: * Patient undergoing [facility] placement. Will revisit for [facility] tomorrow    HPI:  68M pmh metastatic colon cancer with mets to liver, lung and peritoneum (currently on chemo, last was 2 weeks ago), HTN, chronic ascites presented to the ED for weakness, worsening cough, fevers to 103 at home. Pt reports that starting Friday, he had been feeling weak and ill. His wife reports that he has been having subjective fevers, which have been getting worse. Pt took his temp on morning of admission which was found to be 103F. Pt's wife gave him 2 tylenol and brought him to the ED. Pt reported that he had a paracentesis performed on 9/20 for therapeutic purposes. Pt reports that his weight has not increased from the time of the therapeutic tap, however, he does feel more bloated. Pt describes that he has been having a chronic cough for over a year, which he reports is 2/2 postnasal drip, for which he takes Claritin. Pt denied night sweats, hemoptysis, n/v/d/c, abd pain, chest pain, pain on urination at the time of admission.     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 Patient was transferred to MICU 10/2. Palliative consulted for goals of care.     PERTINENT PM/SXH:   Malignant neoplasm of colon, unspecified part of colon  HTN (hypertension)    History of tonsillectomy    FAMILY HISTORY:  Family history of breast cancer in mother (Mother)    ITEMS NOT CHECKED ARE NOT PRESENT    SOCIAL HISTORY:   Significant other/partner:  [ ]  Children:  [x ]  [place]ze/Spirituality: Hindu   Substance hx:  [ ]   Tobacco hx:  [ ]   Alcohol hx: [ ]   Home Opioid hx:  [ ] I-Stop Reference No:  Living Situation: [x ]Home  [ ]Long term care  [ ]Rehab [ ]Other    ADVANCE DIRECTIVES:    DNR  MOLST  [ ]  Living Will  [ ]   DECISION MAKER(s):  [ ] Health Care Proxy(s)  [x ] Surrogate(s)  [ ] Guardian           Name(s): Phone Number(s): WifeEmanuel Barrera [phone]    BASELINE (I)ADL(s) (prior to admission):  [place]: [ ]Total  [ x] Moderate [ ]Dependent    Allergies    sulfa drugs (Unknown)    Intolerances    MEDICATIONS  (STANDING):  albumin human 25% IVPB 100 milliLiter(s) IV Intermittent every 6 hours  meropenem  IVPB 1000 milliGRAM(s) IV Intermittent every 12 hours  meropenem  IVPB      norepinephrine Infusion 0.05 MICROgram(s)/kG/Min (8.766 mL/Hr) IV Continuous <Continuous>  pantoprazole  Injectable 40 milliGRAM(s) IV Push daily  phenylephrine    Infusion 2 MICROgram(s)/kG/Min (70.125 mL/Hr) IV Continuous <Continuous>    MEDICATIONS  (PRN):    PRESENT SYMPTOMS: [x ]Unable to obtain due to poor mentation   Source if other than patient:  [ ]Family   [ ]Team     Pain (Impact on QOL):    Location -         Minimal acceptable level (0-10 scale):                    Aggrevating factors -  Quality -  Radiation -  Severity (0-10 scale) -    Timing -    PAIN AD Score:     Dyspnea:                           [ ]Mild [ ]Moderate [ ]Severe  Anxiety:                             [ ]Mild [ ]Moderate [ ]Severe  Fatigue:                             [ ]Mild [ ]Moderate [ ]Severe  Nausea:                             [ ]Mild [ ]Moderate [ ]Severe  Loss of appetite:              [ ]Mild [ ]Moderate [ ]Severe  Constipation:                    [ ]Mild [ ]Moderate [ ]Severe    Other Symptoms:  [ ]All other review of systems negative     Karnofsky Performance Score/Palliative Performance Status Version 2:         %  PHYSICAL EXAM:  Vital Signs Last 24 Hrs  T(C): 36.2 (02 Oct 2018 13:00), Max: 36.7 (01 Oct 2018 19:02)  T(F): 97.1 (02 Oct 2018 13:00), Max: 98 (01 Oct 2018 19:02)  HR: 96 (02 Oct 2018 14:45) (85 - 106)  BP: 95/56 (02 Oct 2018 14:45) (70/39 - 123/55)  BP(mean): 70 (02 Oct 2018 14:45) (67 - 84)  RR: 25 (02 Oct 2018 14:45) (18 - 30)  SpO2: 100% (02 Oct 2018 14:45) (92% - 100%) I&O's Summary    01 Oct 2018 07:01  -  02 Oct 2018 07:00  --------------------------------------------------------  IN: 1330 mL / OUT: 200 mL / NET: 1130 mL    02 Oct 2018 07:01  -  02 Oct 2018 15:24  --------------------------------------------------------  IN: 386.3 mL / OUT: 5 mL / NET: 381.3 mL    GENERAL:  [ ]Alert  [ ]Oriented x   [ ]Lethargic  [ ]Cachexia  [ ]Unarousable  [ ]Verbal  [ ]Non-Verbal  Behavioral:   [ ] Anxiety  [ ] Delirium [ ] Agitation [ ] Other  HEENT:  [ ]Normal   [ ]Dry mouth   [ ]ET Tube/Trach  [ ]Oral lesions  PULMONARY:   [ ]Clear [ ]Tachypnea  [ ]Audible excessive secretions   [ ]Rhonchi        [ ]Right [ ]Left [ ]Bilateral  [ ]Crackles        [ ]Right [ ]Left [ ]Bilateral  [ ]Wheezing     [ ]Right [ ]Left [ ]Bilateral  CARDIOVASCULAR:    [ ]Regular [ ]Irregular [ ]Tachy  [ ]Julius [ ]Murmur [ ]Other  GASTROINTESTINAL:  [ ]Soft  [ ]Distended   [ ]+BS  [ ]Non tender [ ]Tender  [ ]PEG [ ]OGT/ NGT  Last BM:   GENITOURINARY:  [ ]Normal [ ] Incontinent   [ ]Oliguria/Anuria   [ ]Crawford  MUSCULOSKELETAL:   [ ]Normal   [ ]Weakness  [ ]Bed/Wheelchair bound [ ]Edema  NEUROLOGIC:   [ ]No focal deficits  [ ] Cognitive impairment  [ ] Dysphagia [ ]Dysarthria [ ] Paresis [ ]Other   SKIN:   [ ]Normal   [ ]Pressure ulcer(s)  [ ]Rash    CRITICAL CARE:  [ ] Shock Present  [ ]Septic [ ]Cardiogenic [ ]Neurologic [ ]Hypovolemic  [ ]  Vasopressors [ ]  Inotropes   [ ] Respiratory failure present  [ ] Acute  [ ] Chronic [ ] Hypoxic  [ ] Hypercarbic [ ] Other  [ ] Other organ failure     LABS:                        9.0    32.8  )-----------( 109      ( 02 Oct 2018 10:42 )             29.9   10-02    138  |  102  |  64<H>  ----------------------------<  132<H>  5.7<H>   |  11<L>  |  2.71<H>    Ca    8.1<L>      02 Oct 2018 10:42  Phos  8.0     10-02  Mg     2.8     10-02    TPro  4.9<L>  /  Alb  2.5<L>  /  TBili  1.4<H>  /  DBili  x   /  AST  449<H>  /  ALT  113<H>  /  AlkPhos  357<H>  10-02  PT/INR - ( 02 Oct 2018 10:42 )   PT: 20.2 sec;   INR: 1.83 ratio         PTT - ( 02 Oct 2018 10:42 )  PTT:85.7 sec      RADIOLOGY & ADDITIONAL STUDIES:    PROTEIN CALORIE MALNUTRITION:   [ ] PPSV2 < or = to 30% [ ] significant weight loss  [ ] poor nutritional intake [ ] catabolic state [ ] anasarca     Albumin, Serum: 2.5 g/dL (10-02-18 @ 10:42)  Artificial Nutrition [ ]     REFERRALS:   [ ]Chaplaincy  [ ] Hospice  [ ]Child Life  [ ]Social Work  [ ]Case management [ ]Holistic Therapy   Goals of Care Discussion Document: * Patient undergoing [facility] placement. Will revisit for GOC tomorrow. Discussed with MICU team.

## 2018-10-02 NOTE — PROGRESS NOTE ADULT - PROBLEM SELECTOR PLAN 6
Appears to be chronic   - fibrinogen normal, unlikely in DIC Likely multifactorial: PVT and mets to liver   - CT A/P showing worsening hepatic metastatic disease   - RUQ US showing PVT, c/w lovenox 140mg daily

## 2018-10-02 NOTE — CONSULT NOTE ADULT - ASSESSMENT
68 M with medical history of metastatic colo ca (currently on chemo, last was 2 weeks ago), HTN, chronic ascitics admitted for fever. Found to have ROSEMARY.

## 2018-10-02 NOTE — PROGRESS NOTE ADULT - PROBLEM SELECTOR PLAN 7
Pt's last chemo was 2 weeks ago, will be on hold for now given bacteremia  - f/u Onc recs, s/p 1x zarxio for neutropenia  - hydrocortisone cream for facial rash Appears to be chronic   - fibrinogen normal, unlikely in DIC

## 2018-10-02 NOTE — PROGRESS NOTE ADULT - PROBLEM SELECTOR PLAN 5
Likely multifactorial: PVT and mets to liver   - CT A/P showing worsening hepatic metastatic disease   - RUQ US showing PVT, c/w lovenox 140mg daily With transaminitis  - RUQ US showing PVT, on lovenox 140mg daily  - monitor for bleeding  - holding lovenox for paracentesis

## 2018-10-02 NOTE — PROCEDURE NOTE - NSPROCDETAILS_GEN_ALL_CORE
kit not available for this size catheter/sterile technique, indwelling urinary device inserted
location identified, sterile technique used, catheter introduced, fluid drained/Seldinger technique/sterile dressing applied/ultrasound utilization
sterile dressing applied/ultrasound guidance/sterile technique, catheter placed/lumen(s) aspirated and flushed/guidewire recovered

## 2018-10-02 NOTE — CHART NOTE - NSCHARTNOTEFT_GEN_A_CORE
HPI:  68M pmh metastatic colo ca (currently on chemo, last was 2 weeks ago), HTN, chronic ascitics now presenting to the ED for weakness, worsening cough, fevers to 103 at home. Pt reports that starting Friday, he has been feeling weak and ill. His wife reports that he has been having subjective fevers, which have been getting worse. Pt took his temp on morning of admission which was found to be 103F. Pt's wife gave him 2 tylenol and brought him to the ED. Pt reports that he had a paracentesis performed on 9/20 for therapeutic purposes. Pt reports that his weight has not increased form the time of the therapeutic tap, however, he does feel more bloated. Pt describes that he has been having a chronic cough for over a year, which he reports is 2/2 postnasal drip, for which he tales Claritin. Pt denies night sweats, hemoptysis, n/v/d/c, abd pain, chest pain, pain on urination.     In the ED:   Vitals: 99.5, 115->73, 94/60, rr24 97% on 3L  Notable Labs and Imaging: CXR bibasilar platelike atelectasis; Lactate 6.6->4.3->2.9;   Given: 4.7L NS, Cefepime, Vanc (24 Sep 2018 17:27)      RRT called for hypotension. MICU team already at the bedside and had accepted the patient. RRT was ended, with patient moving to the MICU.

## 2018-10-02 NOTE — PROGRESS NOTE ADULT - SUBJECTIVE AND OBJECTIVE BOX
CC: Patient is a 68y old  Male who presents with a chief complaint of sepsis (02 Oct 2018 07:38)    ID following for Listeria bacteremia    Interval History/ROS: Patient now transferred to MICU. Found to have worsening abd distention and dyspnea. Given Lasix, started on albumin for concern for HRS. Found to have hypotension and now needing pressors.     Rest of ROS negative.    Allergies  sulfa drugs (Unknown)    ANTIMICROBIALS:  meropenem  IVPB 1000 every 12 hours  meropenem  IVPB      PE:    Vital Signs Last 24 Hrs  T(C): 35 (02 Oct 2018 09:45), Max: 36.8 (01 Oct 2018 12:43)  T(F): 95 (02 Oct 2018 09:45), Max: 98.3 (01 Oct 2018 12:43)  HR: 93 (02 Oct 2018 11:30) (85 - 108)  BP: 105/55 (02 Oct 2018 11:30) (70/39 - 123/55)  BP(mean): 76 (02 Oct 2018 11:30) (67 - 80)  RR: 26 (02 Oct 2018 11:30) (18 - 27)  SpO2: 100% (02 Oct 2018 11:30) (91% - 100%)    Gen: AOx3, Non NRB  CV: S1+S2 normal, no murmurs  Resp: Clear bilat, dyspneic  Abd: Distended, tense  Ext: LE edema  : +Crawford, scrotal swelling  IV/Skin: No thrombophlebitis  Neuro: no focal deficits    LABS:                          9.0    32.8  )-----------( 109      ( 02 Oct 2018 10:42 )             29.9       10-02    138  |  102  |  64<H>  ----------------------------<  132<H>  5.7<H>   |  11<L>  |  2.71<H>    Ca    8.1<L>      02 Oct 2018 10:42  Phos  8.0     10-02  Mg     2.8     10-02    TPro  4.9<L>  /  Alb  2.5<L>  /  TBili  1.4<H>  /  DBili  x   /  AST  449<H>  /  ALT  113<H>  /  AlkPhos  357<H>  10-02    MICROBIOLOGY:  Vancomycin Level, Random: 13.7 ug/mL (10-02-18 @ 08:07)  v  .Blood Blood-Peripheral  09-29-18   No growth to date.  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  See previous culture [id number]  --    Growth in aerobic bottle: Gram Positive Rods      .Blood Blood  09-27-18   Growth in aerobic and anaerobic bottles: Listeria monocytogenes  See previous culture [id number]  --    Growth in aerobic bottle: Gram Positive Rods  Growth in anaerobic bottle: Gram Positive Rods      Peritoneal Peritoneal Fluid  09-25-18   No growth at 5 days  --    polymorphonuclear leukocytes seen  No organisms seen by cytocentrifuge      .Blood Blood-Peripheral  09-24-18   Growth in aerobic and anaerobic bottles: Listeria monocytogenes  Resistance to ampicillin or penicillin for Listeria  monocytogenes has not been described. L. monocytogenes is intrinsically  resistant to cephalosporins.  --    Growth in anaerobic bottle: Gram Positive Rods  Growth in aerobic bottle: Gram Positive Rods      .Blood Port Device  09-24-18   Growth in aerobic and anaerobic bottles: Listeria monocytogenes  Resistance to ampicillin or penicillin for Listeria  monocytogenes has not been described. L. monocytogenes is intrinsically  resistant to cephalosporins.  --    Growth in anaerobic bottle: Gram Positive Rods  Growth in aerobic bottle: Gram Positive Rods      .Blood Blood  09-09-18   No growth at 5 days.  --  --    RADIOLOGY:    < from: Xray Chest 1 View- PORTABLE-Urgent (10.01.18 @ 17:01) >  Impression: Improved bibasilar linear atelectasis.    < end of copied text >

## 2018-10-02 NOTE — CHART NOTE - NSCHARTNOTEFT_GEN_A_CORE
MICU Accept Note    CHIEF COMPLAINT:     HPI / INTERVAL HISTORY:  68M pmh metastatic colo ca (currently on chemo, last was 2 weeks ago), HTN, chronic ascitics now presenting to the ED for weakness, worsening cough, fevers to 103 at home. Pt reports that starting Friday, he has been feeling weak and ill. His wife reports that he has been having subjective fevers, which have been getting worse. Pt took his temp on morning of admission which was found to be 103F. Pt's wife gave him 2 tylenol and brought him to the ED. Pt reports that he had a paracentesis performed on 9/20 for therapeutic purposes. Pt reports that his weight has not increased form the time of the therapeutic tap, however, he does feel more bloated. Pt describes that he has been having a chronic cough for over a year, which he reports is 2/2 postnasal drip, for which he tales Claritin. Pt denies night sweats, hemoptysis, n/v/d/c, abd pain, chest pain, pain on urination.     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 Throughout his hospitalization, patient has been experiencing continuously progressive abdominal distention, and worsening renal function. Earlier today, patient began to experienece significant dyspnea, and increased work of breathing. Patient was given 40mg IV Lasix as patient is clinically fluid overloaded. CXR revealed low lung volumes, but no pulmonary edema. Patient was started on Albumin for concern for hepatorenal syndrome. MICU originally consulted on 10/1 consulted today for dyspnea. Patient was not a MICU candidate at this time.    This AM, MICU reconsulted for           PAST MEDICAL & SURGICAL HISTORY:  Malignant neoplasm of colon, unspecified part of colon  HTN (hypertension)  History of tonsillectomy      FAMILY HISTORY:  Family history of breast cancer in mother (Mother)      SOCIAL HISTORY:  Smoking: [  ] Never Smoked  [  ] Former Smoker (# packs x # years)  [  ] Current Smoker (# packs x # years)  Substance Use:   EtOH Use:   Marital Status: [  ] Single  [  ]   [  ]   [  ]   Sexual History:   Occupation:  Recent Travel:  Country of Birth:   Advance Directives:     HOME MEDICATIONS:      Allergies    sulfa drugs (Unknown)    Intolerances          REVIEW OF SYSTEMS:  Constitutional: No fevers, chills, weight loss, weight gain  HEENT: No vision problems, eye pain, nasal congestion, rhinorrhea, sore throat, dysphagia  CV: No chest pain, orthopnea, palpitations  Resp: No cough, dyspnea, wheezing, hemoptysis  GI: No nausea, vomiting, diarrhea, constipation, abdominal pain  : [ ] dysuria [ ] nocturia [ ] hematuria [ ] increased urinary frequency  Musculoskeletal: [ ] back pain [ ] myalgias [ ] arthralgias [ ] fracture  Skin: [ ] rash [ ] itch  Neurological: [ ] headache [ ] dizziness [ ] syncope [ ] weakness [ ] numbness  Psychiatric: [ ] anxiety [ ] depression  Endocrine: [ ] diabetes [ ] thyroid problem  Hematologic/Lymphatic: [ ] anemia [ ] bleeding problem  Allergic/Immunologic: [ ] itchy eyes [ ] nasal discharge [ ] hives [ ] angioedema  [ ] All other systems negative  [ ] Unable to assess ROS because ________    OBJECTIVE:  ICU Vital Signs Last 24 Hrs  T(C): 35 (02 Oct 2018 09:45), Max: 36.8 (01 Oct 2018 12:43)  T(F): 95 (02 Oct 2018 09:45), Max: 98.3 (01 Oct 2018 12:43)  HR: 89 (02 Oct 2018 11:15) (85 - 108)  BP: 110/58 (02 Oct 2018 11:15) (70/39 - 123/55)  BP(mean): 80 (02 Oct 2018 11:15) (67 - 80)  ABP: --  ABP(mean): --  RR: 21 (02 Oct 2018 11:15) (18 - 27)  SpO2: 100% (02 Oct 2018 11:15) (91% - 100%)        10-01 @ 07:01  -  10-02 @ 07:00  --------------------------------------------------------  IN: 1330 mL / OUT: 200 mL / NET: 1130 mL    10-02 @ 07:01  -  10-02 @ 11:28  --------------------------------------------------------  IN: 251.4 mL / OUT: 5 mL / NET: 246.4 mL      CAPILLARY BLOOD GLUCOSE          PHYSICAL EXAM:  General:   HEENT:   Neck:   Chest/Lungs:  Heart:  Abdomen:   Extremities:   Skin:   Neuro:   Psych:     LINES:     HOSPITAL MEDICATIONS:  MEDICATIONS  (STANDING):  albumin human 25% IVPB 100 milliLiter(s) IV Intermittent every 6 hours  benzocaine 15 mG/menthol 3.6 mG Lozenge 1 Lozenge Oral every 4 hours  calcium gluconate IVPB 2 Gram(s) IV Intermittent once  dextrose 50% Injectable 50 milliLiter(s) IV Push once  doxazosin 4 milliGRAM(s) Oral at bedtime  FIRST- Mouthwash  BLM 15 milliLiter(s) Swish and Spit every 6 hours  hydrocortisone 2.5% Cream 1 Application(s) Topical two times a day  influenza   Vaccine 0.5 milliLiter(s) IntraMuscular once  insulin regular  human recombinant. 5 Unit(s) IV Push once  loratadine 10 milliGRAM(s) Oral daily  meropenem  IVPB 1000 milliGRAM(s) IV Intermittent every 12 hours  meropenem  IVPB      norepinephrine Infusion 0.05 MICROgram(s)/kG/Min (8.766 mL/Hr) IV Continuous <Continuous>  pantoprazole    Tablet 40 milliGRAM(s) Oral before breakfast  phenylephrine    Infusion 2 MICROgram(s)/kG/Min (70.125 mL/Hr) IV Continuous <Continuous>  polyethylene glycol 3350 17 Gram(s) Oral daily  sodium bicarbonate  Injectable 50 milliEquivalent(s) IV Push every 5 minutes    MEDICATIONS  (PRN):  acetaminophen   Tablet .. 650 milliGRAM(s) Oral every 6 hours PRN Temp greater or equal to 38C (100.4F), Mild Pain (1 - 3), Moderate Pain (4 - 6)  benzonatate 100 milliGRAM(s) Oral three times a day PRN Cough  Biotene Dry Mouth Oral Rinse 5 milliLiter(s) Swish and Spit every 4 hours PRN Mouth Care  methyl salicylate 14%/menthol 6% Topical Ointment 1 Application(s) Topical two times a day PRN pain  Saliva Substitute (CAPHOSOL) 30 milliLiter(s) Swish and Spit two times a day PRN dry mouth  simethicone 80 milliGRAM(s) Chew every 4 hours PRN Gas  sodium chloride 0.65% Nasal 1 Spray(s) Both Nostrils once PRN Nasal Congestion      LABS:                        9.0    32.8  )-----------( 109      ( 02 Oct 2018 10:42 )             29.9     Hgb Trend: 9.0<--, 9.3<--, 10.2<--, 9.5<--, 9.9<--  10-02    138  |  102  |  64<H>  ----------------------------<  132<H>  5.7<H>   |  11<L>  |  2.71<H>    Ca    8.1<L>      02 Oct 2018 10:42  Phos  8.0     10-02  Mg     2.8     10-02    TPro  4.9<L>  /  Alb  2.5<L>  /  TBili  1.4<H>  /  DBili  x   /  AST  449<H>  /  ALT  113<H>  /  AlkPhos  357<H>  10-02    Creatinine Trend: 2.71<--, 2.58<--, 1.98<--, 1.63<--, 1.15<--, 1.00<--  PT/INR - ( 02 Oct 2018 10:42 )   PT: 20.2 sec;   INR: 1.83 ratio         PTT - ( 02 Oct 2018 10:42 )  PTT:85.7 sec    Arterial Blood Gas:  10-02 @ 10:17  7.18/30/139/11/98/-16.3  ABG lactate: --  Arterial Blood Gas:  10-01 @ 19:35  7.36/22/71/12/94/-11.4  ABG lactate: --    Venous Blood Gas:  10-01 @ 17:07  7.35/30/41/16/67  VBG Lactate: 5.7      MICROBIOLOGY:     RADIOLOGY & ADDITIONAL TESTS:        A/P: MICU Accept Note    CHIEF COMPLAINT:     HPI / INTERVAL HISTORY:  68M pmh metastatic colo ca (currently on chemo, last was 2 weeks ago), HTN, chronic ascitics now presenting to the ED for weakness, worsening cough, fevers to 103 at home. Pt reports that starting Friday, he has been feeling weak and ill. His wife reports that he has been having subjective fevers, which have been getting worse. Pt took his temp on morning of admission which was found to be 103F. Pt's wife gave him 2 tylenol and brought him to the ED. Pt reports that he had a paracentesis performed on 9/20 for therapeutic purposes. Pt reports that his weight has not increased form the time of the therapeutic tap, however, he does feel more bloated. Pt describes that he has been having a chronic cough for over a year, which he reports is 2/2 postnasal drip, for which he tales Claritin. Pt denies night sweats, hemoptysis, n/v/d/c, abd pain, chest pain, pain on urination.     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 Throughout his hospitalization, patient has been experiencing continuously progressive abdominal distention, and worsening renal function. Earlier today, patient began to experienece significant dyspnea, and increased work of breathing. Patient was given 40mg IV Lasix as patient is clinically fluid overloaded. CXR revealed low lung volumes, but no pulmonary edema. Patient was started on Albumin for concern for hepatorenal syndrome. MICU originally consulted on 10/1 consulted today for dyspnea. Patient was not a MICU candidate at this time. This AM, MICU reconsulted for hypotension to the 60s/40s. Patient started on Сергей in the room for hypotension, and transferred to the MICU for further care.           PAST MEDICAL & SURGICAL HISTORY:  Malignant neoplasm of colon, unspecified part of colon  HTN (hypertension)  History of tonsillectomy      FAMILY HISTORY:  Family history of breast cancer in mother (Mother)      SOCIAL HISTORY:  Smoking: [  ] Never Smoked  [  ] Former Smoker (# packs x # years)  [  ] Current Smoker (# packs x # years)  Substance Use:   EtOH Use:   Marital Status: [  ] Single  [  ]   [  ]   [  ]   Sexual History:   Occupation:  Recent Travel:  Country of Birth:   Advance Directives:     HOME MEDICATIONS:      Allergies    sulfa drugs (Unknown)    Intolerances          REVIEW OF SYSTEMS:  Constitutional: No fevers, chills, weight loss, weight gain  HEENT: No vision problems, eye pain, nasal congestion, rhinorrhea, sore throat, dysphagia  CV: No chest pain, orthopnea, palpitations  Resp: + Dyspnea  GI: No nausea, vomiting, diarrhea, constipation, abdominal pain. +Abdominal. distention and pressure   : [ ] dysuria [ ] nocturia [ ] hematuria [ ] increased urinary frequency  Musculoskeletal: [ ] back pain [ ] myalgias [ ] arthralgias [ ] fracture  Skin: [ ] rash [ ] itch  Neurological: [ ] headache [ ] dizziness [ ] syncope [ ] weakness [ ] numbness  Psychiatric: [ ] anxiety [ ] depression  Endocrine: [ ] diabetes [ ] thyroid problem  Hematologic/Lymphatic: [ ] anemia [ ] bleeding problem  Allergic/Immunologic: [ ] itchy eyes [ ] nasal discharge [ ] hives [ ] angioedema  [ ] All other systems negative  [ ] Unable to assess ROS because ________    OBJECTIVE:  ICU Vital Signs Last 24 Hrs  T(C): 35 (02 Oct 2018 09:45), Max: 36.8 (01 Oct 2018 12:43)  T(F): 95 (02 Oct 2018 09:45), Max: 98.3 (01 Oct 2018 12:43)  HR: 89 (02 Oct 2018 11:15) (85 - 108)  BP: 110/58 (02 Oct 2018 11:15) (70/39 - 123/55)  BP(mean): 80 (02 Oct 2018 11:15) (67 - 80)  ABP: --  ABP(mean): --  RR: 21 (02 Oct 2018 11:15) (18 - 27)  SpO2: 100% (02 Oct 2018 11:15) (91% - 100%)        10-01 @ 07:01  -  10-02 @ 07:00  --------------------------------------------------------  IN: 1330 mL / OUT: 200 mL / NET: 1130 mL    10-02 @ 07:01  -  10-02 @ 11:28  --------------------------------------------------------  IN: 251.4 mL / OUT: 5 mL / NET: 246.4 mL      CAPILLARY BLOOD GLUCOSE          PHYSICAL EXAM:  GENERAL: NAD, well-developed  HEAD:  Atraumatic, Normocephalic  EYES: EOMI, PERRLA, conjunctiva and sclera clear  NECK: Supple, No JVD  CHEST/LUNG: Clear to auscultation bilaterally; No wheeze  HEART: Regular rate and rhythm; No murmurs, rubs, or gallops  ABDOMEN: Soft, Nontender, Nondistended; Bowel sounds present  EXTREMITIES:  2+ Peripheral Pulses, No clubbing, cyanosis, or edema  PSYCH: AAOx3  NEUROLOGY: non-focal  SKIN: No rashes or lesions        LINES:     HOSPITAL MEDICATIONS:  MEDICATIONS  (STANDING):  albumin human 25% IVPB 100 milliLiter(s) IV Intermittent every 6 hours  benzocaine 15 mG/menthol 3.6 mG Lozenge 1 Lozenge Oral every 4 hours  calcium gluconate IVPB 2 Gram(s) IV Intermittent once  dextrose 50% Injectable 50 milliLiter(s) IV Push once  doxazosin 4 milliGRAM(s) Oral at bedtime  FIRST- Mouthwash  BLM 15 milliLiter(s) Swish and Spit every 6 hours  hydrocortisone 2.5% Cream 1 Application(s) Topical two times a day  influenza   Vaccine 0.5 milliLiter(s) IntraMuscular once  insulin regular  human recombinant. 5 Unit(s) IV Push once  loratadine 10 milliGRAM(s) Oral daily  meropenem  IVPB 1000 milliGRAM(s) IV Intermittent every 12 hours  meropenem  IVPB      norepinephrine Infusion 0.05 MICROgram(s)/kG/Min (8.766 mL/Hr) IV Continuous <Continuous>  pantoprazole    Tablet 40 milliGRAM(s) Oral before breakfast  phenylephrine    Infusion 2 MICROgram(s)/kG/Min (70.125 mL/Hr) IV Continuous <Continuous>  polyethylene glycol 3350 17 Gram(s) Oral daily  sodium bicarbonate  Injectable 50 milliEquivalent(s) IV Push every 5 minutes    MEDICATIONS  (PRN):  acetaminophen   Tablet .. 650 milliGRAM(s) Oral every 6 hours PRN Temp greater or equal to 38C (100.4F), Mild Pain (1 - 3), Moderate Pain (4 - 6)  benzonatate 100 milliGRAM(s) Oral three times a day PRN Cough  Biotene Dry Mouth Oral Rinse 5 milliLiter(s) Swish and Spit every 4 hours PRN Mouth Care  methyl salicylate 14%/menthol 6% Topical Ointment 1 Application(s) Topical two times a day PRN pain  Saliva Substitute (CAPHOSOL) 30 milliLiter(s) Swish and Spit two times a day PRN dry mouth  simethicone 80 milliGRAM(s) Chew every 4 hours PRN Gas  sodium chloride 0.65% Nasal 1 Spray(s) Both Nostrils once PRN Nasal Congestion      LABS:                        9.0    32.8  )-----------( 109      ( 02 Oct 2018 10:42 )             29.9     Hgb Trend: 9.0<--, 9.3<--, 10.2<--, 9.5<--, 9.9<--  10-02    138  |  102  |  64<H>  ----------------------------<  132<H>  5.7<H>   |  11<L>  |  2.71<H>    Ca    8.1<L>      02 Oct 2018 10:42  Phos  8.0     10-02  Mg     2.8     10-02    TPro  4.9<L>  /  Alb  2.5<L>  /  TBili  1.4<H>  /  DBili  x   /  AST  449<H>  /  ALT  113<H>  /  AlkPhos  357<H>  10-02    Creatinine Trend: 2.71<--, 2.58<--, 1.98<--, 1.63<--, 1.15<--, 1.00<--  PT/INR - ( 02 Oct 2018 10:42 )   PT: 20.2 sec;   INR: 1.83 ratio         PTT - ( 02 Oct 2018 10:42 )  PTT:85.7 sec    Arterial Blood Gas:  10-02 @ 10:17  7.18/30/139/11/98/-16.3  ABG lactate: --  Arterial Blood Gas:  10-01 @ 19:35  7.36/22/71/12/94/-11.4  ABG lactate: --    Venous Blood Gas:  10-01 @ 17:07  7.35/30/41/16/67  VBG Lactate: 5.7      MICROBIOLOGY:     RADIOLOGY & ADDITIONAL TESTS:        A/P: Patient is a 67 yo M with PMHx of metastatic colon cancer (currently on chemo, last was 3 weeks ago), HTN, 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 Will continue to monitor    #Resp  Dyspnea    #CV  Hypotension     #GI    #Renal  Hepatorenal     #Heme/Onc    #Endo    #ID    #DVT ppx MICU Accept Note    CHIEF COMPLAINT:     HPI / INTERVAL HISTORY:  68M pmh metastatic colo ca (currently on chemo, last was 2 weeks ago), HTN, chronic ascitics now presenting to the ED for weakness, worsening cough, fevers to 103 at home. Pt reports that starting Friday, he has been feeling weak and ill. His wife reports that he has been having subjective fevers, which have been getting worse. Pt took his temp on morning of admission which was found to be 103F. Pt's wife gave him 2 tylenol and brought him to the ED. Pt reports that he had a paracentesis performed on 9/20 for therapeutic purposes. Pt reports that his weight has not increased form the time of the therapeutic tap, however, he does feel more bloated. Pt describes that he has been having a chronic cough for over a year, which he reports is 2/2 postnasal drip, for which he tales Claritin. Pt denies night sweats, hemoptysis, n/v/d/c, abd pain, chest pain, pain on urination.     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 Throughout his hospitalization, patient has been experiencing continuously progressive abdominal distention, and worsening renal function. Earlier today, patient began to experienece significant dyspnea, and increased work of breathing. Patient was given 40mg IV Lasix as patient is clinically fluid overloaded. CXR revealed low lung volumes, but no pulmonary edema. Patient was started on Albumin for concern for hepatorenal syndrome. MICU originally consulted on 10/1 consulted today for dyspnea. Patient was not a MICU candidate at this time. This AM, MICU reconsulted for hypotension to the 60s/40s. Patient started on Сергей in the room for hypotension, and transferred to the MICU for further care.           PAST MEDICAL & SURGICAL HISTORY:  Malignant neoplasm of colon, unspecified part of colon  HTN (hypertension)  History of tonsillectomy      FAMILY HISTORY:  Family history of breast cancer in mother (Mother)      SOCIAL HISTORY:  Smoking: [  ] Never Smoked  [  ] Former Smoker (# packs x # years)  [  ] Current Smoker (# packs x # years)  Substance Use:   EtOH Use:   Marital Status: [  ] Single  [  ]   [  ]   [  ]   Sexual History:   Occupation:  Recent Travel:  Country of Birth:   Advance Directives:     HOME MEDICATIONS:      Allergies    sulfa drugs (Unknown)    Intolerances          REVIEW OF SYSTEMS:  Constitutional: No fevers, chills, weight loss, weight gain  HEENT: No vision problems, eye pain, nasal congestion, rhinorrhea, sore throat, dysphagia  CV: No chest pain, orthopnea, palpitations  Resp: + Dyspnea  GI: No nausea, vomiting, diarrhea, constipation, abdominal pain. +Abdominal. distention and pressure   : [ ] dysuria [ ] nocturia [ ] hematuria [ ] increased urinary frequency  Musculoskeletal: [ ] back pain [ ] myalgias [ ] arthralgias [ ] fracture  Skin: [ ] rash [ ] itch  Neurological: [ ] headache [ ] dizziness [ ] syncope [ ] weakness [ ] numbness  Psychiatric: [ ] anxiety [ ] depression  Endocrine: [ ] diabetes [ ] thyroid problem  Hematologic/Lymphatic: [ ] anemia [ ] bleeding problem  Allergic/Immunologic: [ ] itchy eyes [ ] nasal discharge [ ] hives [ ] angioedema  [ ] All other systems negative  [ ] Unable to assess ROS because ________    OBJECTIVE:  ICU Vital Signs Last 24 Hrs  T(C): 35 (02 Oct 2018 09:45), Max: 36.8 (01 Oct 2018 12:43)  T(F): 95 (02 Oct 2018 09:45), Max: 98.3 (01 Oct 2018 12:43)  HR: 89 (02 Oct 2018 11:15) (85 - 108)  BP: 110/58 (02 Oct 2018 11:15) (70/39 - 123/55)  BP(mean): 80 (02 Oct 2018 11:15) (67 - 80)  ABP: --  ABP(mean): --  RR: 21 (02 Oct 2018 11:15) (18 - 27)  SpO2: 100% (02 Oct 2018 11:15) (91% - 100%)        10-01 @ 07:01  -  10-02 @ 07:00  --------------------------------------------------------  IN: 1330 mL / OUT: 200 mL / NET: 1130 mL    10-02 @ 07:01  -  10-02 @ 11:28  --------------------------------------------------------  IN: 251.4 mL / OUT: 5 mL / NET: 246.4 mL      CAPILLARY BLOOD GLUCOSE          PHYSICAL EXAM:  GENERAL: NAD, well-developed  HEAD:  Atraumatic, Normocephalic  EYES: EOMI, PERRLA, conjunctiva and sclera clear  NECK: Supple, No JVD  CHEST/LUNG: Clear to auscultation bilaterally; No wheeze  HEART: Regular rate and rhythm; No murmurs, rubs, or gallops  ABDOMEN: Soft, Nontender, Nondistended; Bowel sounds present  EXTREMITIES:  2+ Peripheral Pulses, No clubbing, cyanosis, or edema  PSYCH: AAOx3  NEUROLOGY: non-focal  SKIN: No rashes or lesions        LINES:     HOSPITAL MEDICATIONS:  MEDICATIONS  (STANDING):  albumin human 25% IVPB 100 milliLiter(s) IV Intermittent every 6 hours  benzocaine 15 mG/menthol 3.6 mG Lozenge 1 Lozenge Oral every 4 hours  calcium gluconate IVPB 2 Gram(s) IV Intermittent once  dextrose 50% Injectable 50 milliLiter(s) IV Push once  doxazosin 4 milliGRAM(s) Oral at bedtime  FIRST- Mouthwash  BLM 15 milliLiter(s) Swish and Spit every 6 hours  hydrocortisone 2.5% Cream 1 Application(s) Topical two times a day  influenza   Vaccine 0.5 milliLiter(s) IntraMuscular once  insulin regular  human recombinant. 5 Unit(s) IV Push once  loratadine 10 milliGRAM(s) Oral daily  meropenem  IVPB 1000 milliGRAM(s) IV Intermittent every 12 hours  meropenem  IVPB      norepinephrine Infusion 0.05 MICROgram(s)/kG/Min (8.766 mL/Hr) IV Continuous <Continuous>  pantoprazole    Tablet 40 milliGRAM(s) Oral before breakfast  phenylephrine    Infusion 2 MICROgram(s)/kG/Min (70.125 mL/Hr) IV Continuous <Continuous>  polyethylene glycol 3350 17 Gram(s) Oral daily  sodium bicarbonate  Injectable 50 milliEquivalent(s) IV Push every 5 minutes    MEDICATIONS  (PRN):  acetaminophen   Tablet .. 650 milliGRAM(s) Oral every 6 hours PRN Temp greater or equal to 38C (100.4F), Mild Pain (1 - 3), Moderate Pain (4 - 6)  benzonatate 100 milliGRAM(s) Oral three times a day PRN Cough  Biotene Dry Mouth Oral Rinse 5 milliLiter(s) Swish and Spit every 4 hours PRN Mouth Care  methyl salicylate 14%/menthol 6% Topical Ointment 1 Application(s) Topical two times a day PRN pain  Saliva Substitute (CAPHOSOL) 30 milliLiter(s) Swish and Spit two times a day PRN dry mouth  simethicone 80 milliGRAM(s) Chew every 4 hours PRN Gas  sodium chloride 0.65% Nasal 1 Spray(s) Both Nostrils once PRN Nasal Congestion      LABS:                        9.0    32.8  )-----------( 109      ( 02 Oct 2018 10:42 )             29.9     Hgb Trend: 9.0<--, 9.3<--, 10.2<--, 9.5<--, 9.9<--  10-02    138  |  102  |  64<H>  ----------------------------<  132<H>  5.7<H>   |  11<L>  |  2.71<H>    Ca    8.1<L>      02 Oct 2018 10:42  Phos  8.0     10-02  Mg     2.8     10-02    TPro  4.9<L>  /  Alb  2.5<L>  /  TBili  1.4<H>  /  DBili  x   /  AST  449<H>  /  ALT  113<H>  /  AlkPhos  357<H>  10-02    Creatinine Trend: 2.71<--, 2.58<--, 1.98<--, 1.63<--, 1.15<--, 1.00<--  PT/INR - ( 02 Oct 2018 10:42 )   PT: 20.2 sec;   INR: 1.83 ratio         PTT - ( 02 Oct 2018 10:42 )  PTT:85.7 sec    Arterial Blood Gas:  10-02 @ 10:17  7.18/30/139/11/98/-16.3  ABG lactate: --  Arterial Blood Gas:  10-01 @ 19:35  7.36/22/71/12/94/-11.4  ABG lactate: --    Venous Blood Gas:  10-01 @ 17:07  7.35/30/41/16/67  VBG Lactate: 5.7      MICROBIOLOGY:     RADIOLOGY & ADDITIONAL TESTS:        A/P: Patient is a 69 yo M with PMHx of metastatic colon cancer (currently on chemo, last was 3 weeks ago), HTN, 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 Will continue to monitor    #Resp  Dyspnea  - patient with significant dyspnea, tachypneic to the mid 20s  - likely 2/2 diaphragm restriction 2/2 mass effect by abdominal ascites and due to metabolic acidosis      #CV  Hypotension     #GI  Malignant Ascites     #Renal  Hepatorenal     #Heme/Onc  Malignant Colon Cancer  Right portal and hepatic vein thrombosis, admitted to the MICU for increased work of breathing and hypotension      #Endo  - stable. continue to monitor     #ID  Listeria Bacteremia   Suspected SBP    #DVT ppx:  - holding all pharmaceutical agents at this time.  - will restart therapeutic Lovenox following paracentesis MICU Accept Note    CHIEF COMPLAINT:     HPI / INTERVAL HISTORY:  68M PMH metastatic colo ca (currently on chemo, last was 2 weeks ago), HTN, chronic ascitics now presenting to the ED for weakness, worsening cough, fevers to 103 at home. Pt reports that starting Friday, he has been feeling weak and ill. His wife reports that he has been having subjective fevers, which have been getting worse. Pt took his temp on morning of admission which was found to be 103F. Pt's wife gave him 2 tylenol and brought him to the ED. Pt reports that he had a paracentesis performed on 9/20 for therapeutic purposes. Pt reports that his weight has not increased form the time of the therapeutic tap, however, he does feel more bloated. Pt describes that he has been having a chronic cough for over a year, which he reports is 2/2 postnasal drip, for which he tales Claritin. Pt denies night sweats, hemoptysis, n/v/d/c, abd pain, chest pain, pain on urination.     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 Throughout his hospitalization, patient has been experiencing continuously progressive abdominal distention, and worsening renal function. Earlier today, patient began to experienece significant dyspnea, and increased work of breathing. Patient was given 40mg IV Lasix as patient is clinically fluid overloaded. CXR revealed low lung volumes, but no pulmonary edema. Patient was started on Albumin for concern for hepatorenal syndrome. MICU originally consulted on 10/1 consulted today for dyspnea. Patient was not a MICU candidate at this time. This AM, MICU reconsulted for hypotension to the 60s/40s. Patient started on Сергей in the room for hypotension, and transferred to the MICU for further care.           PAST MEDICAL & SURGICAL HISTORY:  Malignant neoplasm of colon, unspecified part of colon  HTN (hypertension)  History of tonsillectomy      FAMILY HISTORY:  Family history of breast cancer in mother (Mother)      SOCIAL HISTORY:  Smoking: [  ] Never Smoked  [  ] Former Smoker (# packs x # years)  [  ] Current Smoker (# packs x # years)  Substance Use:   EtOH Use:   Marital Status: [  ] Single  [  ]   [  ]   [  ]   Sexual History:   Occupation:  Recent Travel:  Country of Birth:   Advance Directives:     HOME MEDICATIONS:      Allergies    sulfa drugs (Unknown)    Intolerances          REVIEW OF SYSTEMS:  Constitutional: No fevers, chills, weight loss, weight gain  HEENT: No vision problems, eye pain, nasal congestion, rhinorrhea, sore throat, dysphagia  CV: No chest pain, orthopnea, palpitations  Resp: + Dyspnea  GI: No nausea, vomiting, diarrhea, constipation, abdominal pain. +Abdominal. distention and pressure   : [ ] dysuria [ ] nocturia [ ] hematuria [ ] increased urinary frequency  Musculoskeletal: [ ] back pain [ ] myalgias [ ] arthralgias [ ] fracture  Skin: [ ] rash [ ] itch  Neurological: [ ] headache [ ] dizziness [ ] syncope [ ] weakness [ ] numbness  Psychiatric: [ ] anxiety [ ] depression  Endocrine: [ ] diabetes [ ] thyroid problem  Hematologic/Lymphatic: [ ] anemia [ ] bleeding problem  Allergic/Immunologic: [ ] itchy eyes [ ] nasal discharge [ ] hives [ ] angioedema  [ ] All other systems negative  [ ] Unable to assess ROS because ________    OBJECTIVE:  ICU Vital Signs Last 24 Hrs  T(C): 35 (02 Oct 2018 09:45), Max: 36.8 (01 Oct 2018 12:43)  T(F): 95 (02 Oct 2018 09:45), Max: 98.3 (01 Oct 2018 12:43)  HR: 89 (02 Oct 2018 11:15) (85 - 108)  BP: 110/58 (02 Oct 2018 11:15) (70/39 - 123/55)  BP(mean): 80 (02 Oct 2018 11:15) (67 - 80)  ABP: --  ABP(mean): --  RR: 21 (02 Oct 2018 11:15) (18 - 27)  SpO2: 100% (02 Oct 2018 11:15) (91% - 100%)        10-01 @ 07:01  -  10-02 @ 07:00  --------------------------------------------------------  IN: 1330 mL / OUT: 200 mL / NET: 1130 mL    10-02 @ 07:01  -  10-02 @ 11:28  --------------------------------------------------------  IN: 251.4 mL / OUT: 5 mL / NET: 246.4 mL      CAPILLARY BLOOD GLUCOSE          PHYSICAL EXAM:  GENERAL: NAD, well-developed  HEAD:  Atraumatic, Normocephalic  EYES: EOMI, PERRLA, conjunctiva and sclera clear  NECK: Supple, No JVD  CHEST/LUNG: Clear to auscultation bilaterally; No wheeze  HEART: Regular rate and rhythm; No murmurs, rubs, or gallops  ABDOMEN: Soft, Nontender, Nondistended; Bowel sounds present  EXTREMITIES:  2+ Peripheral Pulses, No clubbing, cyanosis, or edema  PSYCH: AAOx3  NEUROLOGY: non-focal  SKIN: No rashes or lesions        LINES:     HOSPITAL MEDICATIONS:  MEDICATIONS  (STANDING):  albumin human 25% IVPB 100 milliLiter(s) IV Intermittent every 6 hours  benzocaine 15 mG/menthol 3.6 mG Lozenge 1 Lozenge Oral every 4 hours  calcium gluconate IVPB 2 Gram(s) IV Intermittent once  dextrose 50% Injectable 50 milliLiter(s) IV Push once  doxazosin 4 milliGRAM(s) Oral at bedtime  FIRST- Mouthwash  BLM 15 milliLiter(s) Swish and Spit every 6 hours  hydrocortisone 2.5% Cream 1 Application(s) Topical two times a day  influenza   Vaccine 0.5 milliLiter(s) IntraMuscular once  insulin regular  human recombinant. 5 Unit(s) IV Push once  loratadine 10 milliGRAM(s) Oral daily  meropenem  IVPB 1000 milliGRAM(s) IV Intermittent every 12 hours  meropenem  IVPB      norepinephrine Infusion 0.05 MICROgram(s)/kG/Min (8.766 mL/Hr) IV Continuous <Continuous>  pantoprazole    Tablet 40 milliGRAM(s) Oral before breakfast  phenylephrine    Infusion 2 MICROgram(s)/kG/Min (70.125 mL/Hr) IV Continuous <Continuous>  polyethylene glycol 3350 17 Gram(s) Oral daily  sodium bicarbonate  Injectable 50 milliEquivalent(s) IV Push every 5 minutes    MEDICATIONS  (PRN):  acetaminophen   Tablet .. 650 milliGRAM(s) Oral every 6 hours PRN Temp greater or equal to 38C (100.4F), Mild Pain (1 - 3), Moderate Pain (4 - 6)  benzonatate 100 milliGRAM(s) Oral three times a day PRN Cough  Biotene Dry Mouth Oral Rinse 5 milliLiter(s) Swish and Spit every 4 hours PRN Mouth Care  methyl salicylate 14%/menthol 6% Topical Ointment 1 Application(s) Topical two times a day PRN pain  Saliva Substitute (CAPHOSOL) 30 milliLiter(s) Swish and Spit two times a day PRN dry mouth  simethicone 80 milliGRAM(s) Chew every 4 hours PRN Gas  sodium chloride 0.65% Nasal 1 Spray(s) Both Nostrils once PRN Nasal Congestion      LABS:                        9.0    32.8  )-----------( 109      ( 02 Oct 2018 10:42 )             29.9     Hgb Trend: 9.0<--, 9.3<--, 10.2<--, 9.5<--, 9.9<--  10-02    138  |  102  |  64<H>  ----------------------------<  132<H>  5.7<H>   |  11<L>  |  2.71<H>    Ca    8.1<L>      02 Oct 2018 10:42  Phos  8.0     10-02  Mg     2.8     10-02    TPro  4.9<L>  /  Alb  2.5<L>  /  TBili  1.4<H>  /  DBili  x   /  AST  449<H>  /  ALT  113<H>  /  AlkPhos  357<H>  10-02    Creatinine Trend: 2.71<--, 2.58<--, 1.98<--, 1.63<--, 1.15<--, 1.00<--  PT/INR - ( 02 Oct 2018 10:42 )   PT: 20.2 sec;   INR: 1.83 ratio         PTT - ( 02 Oct 2018 10:42 )  PTT:85.7 sec    Arterial Blood Gas:  10-02 @ 10:17  7.18/30/139/11/98/-16.3  ABG lactate: --  Arterial Blood Gas:  10-01 @ 19:35  7.36/22/71/12/94/-11.4  ABG lactate: --    Venous Blood Gas:  10-01 @ 17:07  7.35/30/41/16/67  VBG Lactate: 5.7      MICROBIOLOGY:     RADIOLOGY & ADDITIONAL TESTS:        A/P: Patient is a 69 yo M with PMHx of metastatic colon cancer (currently on chemo, last was 3 weeks ago), HTN, 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 Will continue to monitor    #Resp  Dyspnea  - patient with significant dyspnea, tachypneic to the mid 20s  - POCUS shows b/l A-lines  - likely 2/2 diaphragm restriction 2/2 mass effect by abdominal ascites and due to metabolic acidosis  - plan for therapeutic paracentesis today  - will continue to closely monitor     #CV  Hypotension     #GI  Malignant Ascites     #Renal  Hepatorenal     #Heme/Onc  Malignant Colon Cancer  Right portal and hepatic vein thrombosis, admitted to the MICU for increased work of breathing and hypotension      #Endo  - stable. continue to monitor     #ID  Listeria Bacteremia   Suspected SBP    #DVT ppx:  - holding all pharmaceutical agents at this time.  - will restart therapeutic Lovenox following paracentesis MICU Accept Note    CHIEF COMPLAINT:     HPI / INTERVAL HISTORY:  68M PMH metastatic colo ca (currently on chemo, last was 2 weeks ago), HTN, chronic ascitics now presenting to the ED for weakness, worsening cough, fevers to 103 at home. Pt reports that starting Friday, he has been feeling weak and ill. His wife reports that he has been having subjective fevers, which have been getting worse. Pt took his temp on morning of admission which was found to be 103F. Pt's wife gave him 2 tylenol and brought him to the ED. Pt reports that he had a paracentesis performed on 9/20 for therapeutic purposes. Pt reports that his weight has not increased form the time of the therapeutic tap, however, he does feel more bloated. Pt describes that he has been having a chronic cough for over a year, which he reports is 2/2 postnasal drip, for which he tales Claritin. Pt denies night sweats, hemoptysis, n/v/d/c, abd pain, chest pain, pain on urination.     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 Throughout his hospitalization, patient has been experiencing continuously progressive abdominal distention, and worsening renal function. Earlier today, patient began to experienece significant dyspnea, and increased work of breathing. Patient was given 40mg IV Lasix as patient is clinically fluid overloaded. CXR revealed low lung volumes, but no pulmonary edema. Patient was started on Albumin for concern for hepatorenal syndrome. MICU originally consulted on 10/1 consulted today for dyspnea. Patient was not a MICU candidate at this time. This AM, MICU reconsulted for hypotension to the 60s/40s. Patient started on Сергей in the room for hypotension, and transferred to the MICU for further care.           PAST MEDICAL & SURGICAL HISTORY:  Malignant neoplasm of colon, unspecified part of colon  HTN (hypertension)  History of tonsillectomy      FAMILY HISTORY:  Family history of breast cancer in mother (Mother)      SOCIAL HISTORY:  Smoking: [  ] Never Smoked  [  ] Former Smoker (# packs x # years)  [  ] Current Smoker (# packs x # years)  Substance Use:   EtOH Use:   Marital Status: [  ] Single  [  ]   [  ]   [  ]   Sexual History:   Occupation:  Recent Travel:  Country of Birth:   Advance Directives:     HOME MEDICATIONS:      Allergies    sulfa drugs (Unknown)    Intolerances          REVIEW OF SYSTEMS:  Constitutional: No fevers, chills, weight loss, weight gain  HEENT: No vision problems, eye pain, nasal congestion, rhinorrhea, sore throat, dysphagia  CV: No chest pain, orthopnea, palpitations  Resp: + Dyspnea  GI: No nausea, vomiting, diarrhea, constipation, abdominal pain. +Abdominal. distention and pressure   : [ ] dysuria [ ] nocturia [ ] hematuria [ ] increased urinary frequency  Musculoskeletal: [ ] back pain [ ] myalgias [ ] arthralgias [ ] fracture  Skin: [ ] rash [ ] itch  Neurological: [ ] headache [ ] dizziness [ ] syncope [ ] weakness [ ] numbness  Psychiatric: [ ] anxiety [ ] depression  Endocrine: [ ] diabetes [ ] thyroid problem  Hematologic/Lymphatic: [ ] anemia [ ] bleeding problem  Allergic/Immunologic: [ ] itchy eyes [ ] nasal discharge [ ] hives [ ] angioedema  [ ] All other systems negative  [ ] Unable to assess ROS because ________    OBJECTIVE:  ICU Vital Signs Last 24 Hrs  T(C): 35 (02 Oct 2018 09:45), Max: 36.8 (01 Oct 2018 12:43)  T(F): 95 (02 Oct 2018 09:45), Max: 98.3 (01 Oct 2018 12:43)  HR: 89 (02 Oct 2018 11:15) (85 - 108)  BP: 110/58 (02 Oct 2018 11:15) (70/39 - 123/55)  BP(mean): 80 (02 Oct 2018 11:15) (67 - 80)  ABP: --  ABP(mean): --  RR: 21 (02 Oct 2018 11:15) (18 - 27)  SpO2: 100% (02 Oct 2018 11:15) (91% - 100%)        10-01 @ 07:01  -  10-02 @ 07:00  --------------------------------------------------------  IN: 1330 mL / OUT: 200 mL / NET: 1130 mL    10-02 @ 07:01  -  10-02 @ 11:28  --------------------------------------------------------  IN: 251.4 mL / OUT: 5 mL / NET: 246.4 mL      CAPILLARY BLOOD GLUCOSE          PHYSICAL EXAM:  GENERAL: NAD, well-developed  HEAD:  Atraumatic, Normocephalic  EYES: EOMI, PERRLA, conjunctiva and sclera clear  NECK: Supple, No JVD  CHEST/LUNG: Clear to auscultation bilaterally; No wheeze  HEART: Regular rate and rhythm; No murmurs, rubs, or gallops  ABDOMEN: Soft, Nontender, Nondistended; Bowel sounds present  EXTREMITIES:  2+ Peripheral Pulses, No clubbing, cyanosis, or edema  PSYCH: AAOx3  NEUROLOGY: non-focal  SKIN: No rashes or lesions        LINES:     HOSPITAL MEDICATIONS:  MEDICATIONS  (STANDING):  albumin human 25% IVPB 100 milliLiter(s) IV Intermittent every 6 hours  benzocaine 15 mG/menthol 3.6 mG Lozenge 1 Lozenge Oral every 4 hours  calcium gluconate IVPB 2 Gram(s) IV Intermittent once  dextrose 50% Injectable 50 milliLiter(s) IV Push once  doxazosin 4 milliGRAM(s) Oral at bedtime  FIRST- Mouthwash  BLM 15 milliLiter(s) Swish and Spit every 6 hours  hydrocortisone 2.5% Cream 1 Application(s) Topical two times a day  influenza   Vaccine 0.5 milliLiter(s) IntraMuscular once  insulin regular  human recombinant. 5 Unit(s) IV Push once  loratadine 10 milliGRAM(s) Oral daily  meropenem  IVPB 1000 milliGRAM(s) IV Intermittent every 12 hours  meropenem  IVPB      norepinephrine Infusion 0.05 MICROgram(s)/kG/Min (8.766 mL/Hr) IV Continuous <Continuous>  pantoprazole    Tablet 40 milliGRAM(s) Oral before breakfast  phenylephrine    Infusion 2 MICROgram(s)/kG/Min (70.125 mL/Hr) IV Continuous <Continuous>  polyethylene glycol 3350 17 Gram(s) Oral daily  sodium bicarbonate  Injectable 50 milliEquivalent(s) IV Push every 5 minutes    MEDICATIONS  (PRN):  acetaminophen   Tablet .. 650 milliGRAM(s) Oral every 6 hours PRN Temp greater or equal to 38C (100.4F), Mild Pain (1 - 3), Moderate Pain (4 - 6)  benzonatate 100 milliGRAM(s) Oral three times a day PRN Cough  Biotene Dry Mouth Oral Rinse 5 milliLiter(s) Swish and Spit every 4 hours PRN Mouth Care  methyl salicylate 14%/menthol 6% Topical Ointment 1 Application(s) Topical two times a day PRN pain  Saliva Substitute (CAPHOSOL) 30 milliLiter(s) Swish and Spit two times a day PRN dry mouth  simethicone 80 milliGRAM(s) Chew every 4 hours PRN Gas  sodium chloride 0.65% Nasal 1 Spray(s) Both Nostrils once PRN Nasal Congestion      LABS:                        9.0    32.8  )-----------( 109      ( 02 Oct 2018 10:42 )             29.9     Hgb Trend: 9.0<--, 9.3<--, 10.2<--, 9.5<--, 9.9<--  10-02    138  |  102  |  64<H>  ----------------------------<  132<H>  5.7<H>   |  11<L>  |  2.71<H>    Ca    8.1<L>      02 Oct 2018 10:42  Phos  8.0     10-02  Mg     2.8     10-02    TPro  4.9<L>  /  Alb  2.5<L>  /  TBili  1.4<H>  /  DBili  x   /  AST  449<H>  /  ALT  113<H>  /  AlkPhos  357<H>  10-02    Creatinine Trend: 2.71<--, 2.58<--, 1.98<--, 1.63<--, 1.15<--, 1.00<--  PT/INR - ( 02 Oct 2018 10:42 )   PT: 20.2 sec;   INR: 1.83 ratio         PTT - ( 02 Oct 2018 10:42 )  PTT:85.7 sec    Arterial Blood Gas:  10-02 @ 10:17  7.18/30/139/11/98/-16.3  ABG lactate: --  Arterial Blood Gas:  10-01 @ 19:35  7.36/22/71/12/94/-11.4  ABG lactate: --    Venous Blood Gas:  10-01 @ 17:07  7.35/30/41/16/67  VBG Lactate: 5.7      MICROBIOLOGY:     RADIOLOGY & ADDITIONAL TESTS:        A/P: Patient is a 69 yo M with PMHx of metastatic colon cancer (currently on chemo, last was 3 weeks ago), HTN, 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 Will continue to monitor    #Resp  Dyspnea  - patient with significant dyspnea, tachypneic to the mid 20s  - POCUS shows b/l A-lines  - likely 2/2 diaphragm restriction 2/2 mass effect by abdominal ascites and due to metabolic acidosis  - plan for therapeutic paracentesis today  - will continue to closely monitor     #CV  Hypotension   - patient hypotensive to SBP in the 60s  - multifactorial 2/2 possible septic shock vs compression of IVC 2/2 ascites  - started on Сергей initially, but will transition to Levo  - will start 100 of albumin q6 x 6 doses  - will continue pressor support as required  - continue to monitor     #GI  Malignant Ascites   - patient with diagnostic para in ED on admission with positive cytopath for malignant ascites  - will perform diagnostic and therapeutic para today   - chemo as per Onc    #Renal  Hepatorenal Syndrome  - Patient with a greater than 2 fold increase in Cr (with Cr >2.5), and decreased urinary output consistent with Type 1 Hepatorenal Syndrome   - will continue pressor support with Levo  - will start 100 of albumin q6 x 6 doses  - continue to monitor daily Cr    #Heme/Onc  Malignant Colon Cancer  - patient with Colon cancer with mets to the to the liver, peritoneum, and lung  -  s/p 6 cycles of FOLFOX, and was on week 2 of vectibix and irinotecan on admission  - Chemo currently on hold due to sepsis  - will continue to f/u Onc recs  Right portal and hepatic vein thrombosis  - patient with 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 continue to monitor     #ID  Listeria Bacteremia   - patient with listeria bacteremia, with last blood cx on 9/29 NGTD  - As per ID, continue with Meropenem for now  Suspected SBP  - patient with an uptrending WBC count  - will perform diagnostic para today to r/o SBP  - continue with Dante for now and will narrow if negative for SBP    #DVT ppx:  - holding all pharmaceutical agents at this time.  - will restart therapeutic Lovenox following paracentesis MICU Accept Note    CHIEF COMPLAINT:     HPI / INTERVAL HISTORY:  68M PMH metastatic colo ca (currently on chemo, last was 2 weeks ago), HTN, chronic ascitics now presenting to the ED for weakness, worsening cough, fevers to 103 at home. Pt reports that starting Friday, he has been feeling weak and ill. His wife reports that he has been having subjective fevers, which have been getting worse. Pt took his temp on morning of admission which was found to be 103F. Pt's wife gave him 2 tylenol and brought him to the ED. Pt reports that he had a paracentesis performed on 9/20 for therapeutic purposes. Pt reports that his weight has not increased form the time of the therapeutic tap, however, he does feel more bloated. Pt describes that he has been having a chronic cough for over a year, which he reports is 2/2 postnasal drip, for which he tales Claritin. Pt denies night sweats, hemoptysis, n/v/d/c, abd pain, chest pain, pain on urination.     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 Throughout his hospitalization, patient has been experiencing continuously progressive abdominal distention, and worsening renal function. Earlier today, patient began to experienece significant dyspnea, and increased work of breathing. Patient was given 40mg IV Lasix as patient is clinically fluid overloaded. CXR revealed low lung volumes, but no pulmonary edema. Patient was started on Albumin for concern for hepatorenal syndrome. MICU originally consulted on 10/1 consulted today for dyspnea. Patient was not a MICU candidate at this time. This AM, MICU reconsulted for hypotension to the 60s/40s. Patient started on Сергей in the room for hypotension, and transferred to the MICU for further care.           PAST MEDICAL & SURGICAL HISTORY:  Malignant neoplasm of colon, unspecified part of colon  HTN (hypertension)  History of tonsillectomy      FAMILY HISTORY:  Family history of breast cancer in mother (Mother)      SOCIAL HISTORY:  Smoking: [  ] Never Smoked  [  ] Former Smoker (# packs x # years)  [  ] Current Smoker (# packs x # years)  Substance Use:   EtOH Use:   Marital Status: [  ] Single  [  ]   [  ]   [  ]   Sexual History:   Occupation:  Recent Travel:  Country of Birth:   Advance Directives:     HOME MEDICATIONS:      Allergies    sulfa drugs (Unknown)    Intolerances          REVIEW OF SYSTEMS:  Constitutional: No fevers, chills, weight loss, weight gain  HEENT: No vision problems, eye pain, nasal congestion, rhinorrhea, sore throat, dysphagia  CV: No chest pain, orthopnea, palpitations  Resp: + Dyspnea  GI: No nausea, vomiting, diarrhea, constipation, abdominal pain. +Abdominal. distention and pressure   : [ ] dysuria [ ] nocturia [ ] hematuria [ ] increased urinary frequency  Musculoskeletal: [ ] back pain [ ] myalgias [ ] arthralgias [ ] fracture  Skin: [ ] rash [ ] itch  Neurological: [ ] headache [ ] dizziness [ ] syncope [ ] weakness [ ] numbness  Psychiatric: [ ] anxiety [ ] depression  Endocrine: [ ] diabetes [ ] thyroid problem  Hematologic/Lymphatic: [ ] anemia [ ] bleeding problem  Allergic/Immunologic: [ ] itchy eyes [ ] nasal discharge [ ] hives [ ] angioedema  [ ] All other systems negative  [ ] Unable to assess ROS because ________    OBJECTIVE:  ICU Vital Signs Last 24 Hrs  T(C): 35 (02 Oct 2018 09:45), Max: 36.8 (01 Oct 2018 12:43)  T(F): 95 (02 Oct 2018 09:45), Max: 98.3 (01 Oct 2018 12:43)  HR: 89 (02 Oct 2018 11:15) (85 - 108)  BP: 110/58 (02 Oct 2018 11:15) (70/39 - 123/55)  BP(mean): 80 (02 Oct 2018 11:15) (67 - 80)  ABP: --  ABP(mean): --  RR: 21 (02 Oct 2018 11:15) (18 - 27)  SpO2: 100% (02 Oct 2018 11:15) (91% - 100%)        10-01 @ 07:01  -  10-02 @ 07:00  --------------------------------------------------------  IN: 1330 mL / OUT: 200 mL / NET: 1130 mL    10-02 @ 07:01  -  10-02 @ 11:28  --------------------------------------------------------  IN: 251.4 mL / OUT: 5 mL / NET: 246.4 mL      CAPILLARY BLOOD GLUCOSE          PHYSICAL EXAM:  GENERAL: NAD, well-developed  HEAD:  Atraumatic, Normocephalic  EYES: EOMI, PERRLA, conjunctiva and sclera clear  NECK: Supple, No JVD  CHEST/LUNG: Clear to auscultation bilaterally; No wheeze  HEART: Regular rate and rhythm; No murmurs, rubs, or gallops  ABDOMEN: Soft, Nontender, Nondistended; Bowel sounds present  EXTREMITIES:  2+ Peripheral Pulses, No clubbing, cyanosis, or edema  PSYCH: AAOx3  NEUROLOGY: non-focal  SKIN: No rashes or lesions        LINES:     HOSPITAL MEDICATIONS:  MEDICATIONS  (STANDING):  albumin human 25% IVPB 100 milliLiter(s) IV Intermittent every 6 hours  benzocaine 15 mG/menthol 3.6 mG Lozenge 1 Lozenge Oral every 4 hours  calcium gluconate IVPB 2 Gram(s) IV Intermittent once  dextrose 50% Injectable 50 milliLiter(s) IV Push once  doxazosin 4 milliGRAM(s) Oral at bedtime  FIRST- Mouthwash  BLM 15 milliLiter(s) Swish and Spit every 6 hours  hydrocortisone 2.5% Cream 1 Application(s) Topical two times a day  influenza   Vaccine 0.5 milliLiter(s) IntraMuscular once  insulin regular  human recombinant. 5 Unit(s) IV Push once  loratadine 10 milliGRAM(s) Oral daily  meropenem  IVPB 1000 milliGRAM(s) IV Intermittent every 12 hours  meropenem  IVPB      norepinephrine Infusion 0.05 MICROgram(s)/kG/Min (8.766 mL/Hr) IV Continuous <Continuous>  pantoprazole    Tablet 40 milliGRAM(s) Oral before breakfast  phenylephrine    Infusion 2 MICROgram(s)/kG/Min (70.125 mL/Hr) IV Continuous <Continuous>  polyethylene glycol 3350 17 Gram(s) Oral daily  sodium bicarbonate  Injectable 50 milliEquivalent(s) IV Push every 5 minutes    MEDICATIONS  (PRN):  acetaminophen   Tablet .. 650 milliGRAM(s) Oral every 6 hours PRN Temp greater or equal to 38C (100.4F), Mild Pain (1 - 3), Moderate Pain (4 - 6)  benzonatate 100 milliGRAM(s) Oral three times a day PRN Cough  Biotene Dry Mouth Oral Rinse 5 milliLiter(s) Swish and Spit every 4 hours PRN Mouth Care  methyl salicylate 14%/menthol 6% Topical Ointment 1 Application(s) Topical two times a day PRN pain  Saliva Substitute (CAPHOSOL) 30 milliLiter(s) Swish and Spit two times a day PRN dry mouth  simethicone 80 milliGRAM(s) Chew every 4 hours PRN Gas  sodium chloride 0.65% Nasal 1 Spray(s) Both Nostrils once PRN Nasal Congestion      LABS:                        9.0    32.8  )-----------( 109      ( 02 Oct 2018 10:42 )             29.9     Hgb Trend: 9.0<--, 9.3<--, 10.2<--, 9.5<--, 9.9<--  10-02    138  |  102  |  64<H>  ----------------------------<  132<H>  5.7<H>   |  11<L>  |  2.71<H>    Ca    8.1<L>      02 Oct 2018 10:42  Phos  8.0     10-02  Mg     2.8     10-02    TPro  4.9<L>  /  Alb  2.5<L>  /  TBili  1.4<H>  /  DBili  x   /  AST  449<H>  /  ALT  113<H>  /  AlkPhos  357<H>  10-02    Creatinine Trend: 2.71<--, 2.58<--, 1.98<--, 1.63<--, 1.15<--, 1.00<--  PT/INR - ( 02 Oct 2018 10:42 )   PT: 20.2 sec;   INR: 1.83 ratio         PTT - ( 02 Oct 2018 10:42 )  PTT:85.7 sec    Arterial Blood Gas:  10-02 @ 10:17  7.18/30/139/11/98/-16.3  ABG lactate: --  Arterial Blood Gas:  10-01 @ 19:35  7.36/22/71/12/94/-11.4  ABG lactate: --    Venous Blood Gas:  10-01 @ 17:07  7.35/30/41/16/67  VBG Lactate: 5.7      MICROBIOLOGY:     RADIOLOGY & ADDITIONAL TESTS:        A/P: Patient is a 67 yo M with PMHx of metastatic colon cancer (currently on chemo, last was 3 weeks ago), HTN, 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 Will continue to monitor    #Resp  Dyspnea  - patient with significant dyspnea, tachypneic to the mid 20s  - POCUS shows b/l A-lines  - likely 2/2 diaphragm restriction 2/2 mass effect by abdominal ascites and due to metabolic acidosis  - plan for therapeutic paracentesis today  - will continue to closely monitor     #CV  Hypotension   - patient hypotensive to SBP in the 60s  - multifactorial 2/2 possible septic shock vs compression of IVC 2/2 ascites  - started on Сергей initially, but will transition to Levo  - will start 100 of albumin q6 x 6 doses  - will continue pressor support as required  - continue to monitor     #GI  Malignant Ascites   - patient with diagnostic para in ED on admission with positive cytopath for malignant ascites  - will perform diagnostic and therapeutic para today   - chemo as per Onc    #Renal  Hepatorenal Syndrome  - Patient with a greater than 2 fold increase in Cr (with Cr >2.5), and decreased urinary output consistent with Type 1 Hepatorenal Syndrome   - will continue pressor support with Levo  - will start 100 of albumin q6 x 6 doses  - continue to monitor daily Cr    #Heme/Onc  Malignant Colon Cancer  - patient with Colon cancer with mets to the to the liver, peritoneum, and lung  -  s/p 6 cycles of FOLFOX, and was on week 2 of vectibix and irinotecan on admission  - Chemo currently on hold due to sepsis  - will continue to f/u Onc recs  Right portal and hepatic vein thrombosis  - patient with 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 continue to monitor     #ID  Listeria Bacteremia   - patient with listeria bacteremia, with last blood cx on 9/29 NGTD  - As per ID, continue with Meropenem for now  Suspected SBP  - patient with an uptrending WBC count  - will perform diagnostic para today to r/o SBP  - continue with Dante for now and will narrow if negative for SBP    #DVT ppx:  - holding all pharmaceutical agents at this time.  - will restart therapeutic Lovenox following paracentesis    Attending Note:    68 year old man with advanced colon cancer on 2nd line chemotherapy, listeria bacteremia, portal vein thormobsis, now with oliguric renal failure, acidemia, abdominal distention and discomfort with increasing ascites ? SBP +/- abdominal compartment syndrome contributing to renal failure, hepato renal syndrome, coagulopathy transferred to MICU for vasopressor support secondary to hypotension not responding to volume resuscitation

## 2018-10-02 NOTE — PROGRESS NOTE ADULT - PROBLEM SELECTOR PLAN 1
Patient with chronic ascites, diagnostic tap initially in ED negative for SBP but suspicious for malignant cells   - creatinine elevated 1->1.5->2. Lasix held for hypotension.   - was given albumin yesterday, will tap today - diagnostic and therapeutic Unclear is due to worsening infection or pre renal  RRT called. given NS bolus  MICU at bedside and likely transfer to MICU

## 2018-10-02 NOTE — PROGRESS NOTE ADULT - SUBJECTIVE AND OBJECTIVE BOX
Patient is a 68y old  Male who presents with a chief complaint of sepsis (01 Oct 2018 18:34)    INTERVAL HPI/OVERNIGHT EVENTS: Patient with increasing dyspnea yesterday likely due to worsening ascites. Episode of emesis overnight. MICU was consulted. Lasix dose held due to systolic 90-100s. Lovenox dose was held for paracentesis today.      SUBJECTIVE: Patient seen and examined at bedside.       OBJECTIVE:    VITAL SIGNS:  T(C): 36.3 (02 Oct 2018 04:49), Max: 36.8 (01 Oct 2018 12:43)  T(F): 97.3 (02 Oct 2018 04:49), Max: 98.3 (01 Oct 2018 12:43)  HR: 91 (02 Oct 2018 04:49) (91 - 108)  BP: 93/57 (02 Oct 2018 04:49) (92/65 - 110/74)  RR: 21 (02 Oct 2018 04:49) (18 - 24)  SpO2: 96% (02 Oct 2018 04:49) (91% - 96%)        10-01 @ 07:01  -  10-02 @ 07:00  --------------------------------------------------------  IN: 1330 mL / OUT: 200 mL / NET: 1130 mL      CAPILLARY BLOOD GLUCOSE          PHYSICAL EXAM:    General: NAD  HEENT: NC/AT; PERRL, clear conjunctiva  Neck: supple  Respiratory: CTA b/l  Cardiovascular: +S1/S2; RRR  Abdomen: soft, NT/ND; +BS x4  Extremities: WWP, 2+ peripheral pulses b/l; no LE edema  Skin: normal color and turgor; no rash  Neurological: AAOx3    MEDICATIONS:  MEDICATIONS  (STANDING):  albumin human 25% IVPB 50 milliLiter(s) IV Intermittent every 6 hours  benzocaine 15 mG/menthol 3.6 mG Lozenge 1 Lozenge Oral every 4 hours  doxazosin 4 milliGRAM(s) Oral at bedtime  FIRST- Mouthwash  BLM 15 milliLiter(s) Swish and Spit every 6 hours  furosemide   Injectable 40 milliGRAM(s) IV Push daily  hydrocortisone 2.5% Cream 1 Application(s) Topical two times a day  influenza   Vaccine 0.5 milliLiter(s) IntraMuscular once  loratadine 10 milliGRAM(s) Oral daily  meropenem  IVPB 1000 milliGRAM(s) IV Intermittent every 12 hours  meropenem  IVPB      pantoprazole    Tablet 40 milliGRAM(s) Oral before breakfast  polyethylene glycol 3350 17 Gram(s) Oral daily    MEDICATIONS  (PRN):  acetaminophen   Tablet .. 650 milliGRAM(s) Oral every 6 hours PRN Temp greater or equal to 38C (100.4F), Mild Pain (1 - 3), Moderate Pain (4 - 6)  benzonatate 100 milliGRAM(s) Oral three times a day PRN Cough  Biotene Dry Mouth Oral Rinse 5 milliLiter(s) Swish and Spit every 4 hours PRN Mouth Care  methyl salicylate 14%/menthol 6% Topical Ointment 1 Application(s) Topical two times a day PRN pain  Saliva Substitute (CAPHOSOL) 30 milliLiter(s) Swish and Spit two times a day PRN dry mouth  simethicone 80 milliGRAM(s) Chew every 4 hours PRN Gas  sodium chloride 0.65% Nasal 1 Spray(s) Both Nostrils once PRN Nasal Congestion      ALLERGIES:  Allergies    sulfa drugs (Unknown)    Intolerances        LABS:                        10.2   20.4  )-----------( 87       ( 01 Oct 2018 07:23 )             31.9     10-01    137  |  103  |  55<H>  ----------------------------<  148<H>  5.1   |  15<L>  |  1.98<H>    Ca    7.9<L>      01 Oct 2018 17:07    TPro  4.7<L>  /  Alb  1.8<L>  /  TBili  1.1  /  DBili  x   /  AST  311<H>  /  ALT  112<H>  /  AlkPhos  526<H>  10-01          RADIOLOGY & ADDITIONAL TESTS: Reviewed. Patient is a 68y old  Male who presents with a chief complaint of sepsis (01 Oct 2018 18:34)    INTERVAL HPI/OVERNIGHT EVENTS: Patient with increasing dyspnea yesterday likely due to worsening ascites. Episode of emesis overnight. MICU was consulted. Lasix dose held due to systolic 90-100s. Lovenox dose was held for paracentesis today.      SUBJECTIVE: Patient seen and examined at bedside. Patient was hypotensive       OBJECTIVE:    VITAL SIGNS:  T(C): 36.3 (02 Oct 2018 04:49), Max: 36.8 (01 Oct 2018 12:43)  T(F): 97.3 (02 Oct 2018 04:49), Max: 98.3 (01 Oct 2018 12:43)  HR: 91 (02 Oct 2018 04:49) (91 - 108)  BP: 93/57 (02 Oct 2018 04:49) (92/65 - 110/74)  RR: 21 (02 Oct 2018 04:49) (18 - 24)  SpO2: 96% (02 Oct 2018 04:49) (91% - 96%)        10-01 @ 07:01  -  10-02 @ 07:00  --------------------------------------------------------  IN: 1330 mL / OUT: 200 mL / NET: 1130 mL      CAPILLARY BLOOD GLUCOSE          PHYSICAL EXAM:    HEENT: NC/AT; PERRL, clear conjunctiva  Neck: supple  Respiratory: CTA b/l anteriorly   Cardiovascular: +S1/S2; RRR  Abdomen: distended, diffusely tender to deep palpation, BS+   Extremities: WWP, 2+ peripheral pulses b/l; 2+ pitting LE edema   Skin: normal color and turgor; facial rash with hydrocortisone cream   Neurological: AAOx3    MEDICATIONS:  MEDICATIONS  (STANDING):  albumin human 25% IVPB 50 milliLiter(s) IV Intermittent every 6 hours  benzocaine 15 mG/menthol 3.6 mG Lozenge 1 Lozenge Oral every 4 hours  doxazosin 4 milliGRAM(s) Oral at bedtime  FIRST- Mouthwash  BLM 15 milliLiter(s) Swish and Spit every 6 hours  furosemide   Injectable 40 milliGRAM(s) IV Push daily  hydrocortisone 2.5% Cream 1 Application(s) Topical two times a day  influenza   Vaccine 0.5 milliLiter(s) IntraMuscular once  loratadine 10 milliGRAM(s) Oral daily  meropenem  IVPB 1000 milliGRAM(s) IV Intermittent every 12 hours  meropenem  IVPB      pantoprazole    Tablet 40 milliGRAM(s) Oral before breakfast  polyethylene glycol 3350 17 Gram(s) Oral daily    MEDICATIONS  (PRN):  acetaminophen   Tablet .. 650 milliGRAM(s) Oral every 6 hours PRN Temp greater or equal to 38C (100.4F), Mild Pain (1 - 3), Moderate Pain (4 - 6)  benzonatate 100 milliGRAM(s) Oral three times a day PRN Cough  Biotene Dry Mouth Oral Rinse 5 milliLiter(s) Swish and Spit every 4 hours PRN Mouth Care  methyl salicylate 14%/menthol 6% Topical Ointment 1 Application(s) Topical two times a day PRN pain  Saliva Substitute (CAPHOSOL) 30 milliLiter(s) Swish and Spit two times a day PRN dry mouth  simethicone 80 milliGRAM(s) Chew every 4 hours PRN Gas  sodium chloride 0.65% Nasal 1 Spray(s) Both Nostrils once PRN Nasal Congestion      ALLERGIES:  Allergies    sulfa drugs (Unknown)    Intolerances        LABS:                        10.2   20.4  )-----------( 87       ( 01 Oct 2018 07:23 )             31.9     10-01    137  |  103  |  55<H>  ----------------------------<  148<H>  5.1   |  15<L>  |  1.98<H>    Ca    7.9<L>      01 Oct 2018 17:07    TPro  4.7<L>  /  Alb  1.8<L>  /  TBili  1.1  /  DBili  x   /  AST  311<H>  /  ALT  112<H>  /  AlkPhos  526<H>  10-01          RADIOLOGY & ADDITIONAL TESTS: Reviewed. Patient is a 68y old  Male who presents with a chief complaint of sepsis (01 Oct 2018 18:34)    INTERVAL HPI/OVERNIGHT EVENTS: Patient with increasing dyspnea yesterday likely due to worsening ascites. Episode of emesis overnight. MICU was consulted. Lasix dose held due to systolic 90-100s. Lovenox dose was held for paracentesis today.      SUBJECTIVE: Patient seen and examined at bedside. Patient was hypotensive and SOB. Started on non-rebreather. Persistently hypotensive to the 60s/40s. MICU was called, rapid was called. Patient was transferred to MICU.        OBJECTIVE:    VITAL SIGNS:  T(C): 36.3 (02 Oct 2018 04:49), Max: 36.8 (01 Oct 2018 12:43)  T(F): 97.3 (02 Oct 2018 04:49), Max: 98.3 (01 Oct 2018 12:43)  HR: 91 (02 Oct 2018 04:49) (91 - 108)  BP: 93/57 (02 Oct 2018 04:49) (92/65 - 110/74)  RR: 21 (02 Oct 2018 04:49) (18 - 24)  SpO2: 96% (02 Oct 2018 04:49) (91% - 96%)        10-01 @ 07:01  -  10-02 @ 07:00  --------------------------------------------------------  IN: 1330 mL / OUT: 200 mL / NET: 1130 mL      CAPILLARY BLOOD GLUCOSE          PHYSICAL EXAM:  General: Patient awake and alert but in distress, having increased work of breathing, placed on non-rebreather   HEENT: NC/AT; PERRL, clear conjunctiva  Neck: supple  Respiratory: crackles in R lobe anterior, overall increased work of breathing   Cardiovascular: +S1/S2; RRR  Abdomen: distended, diffusely tender to deep palpation, BS+   Extremities: WWP, 2+ peripheral pulses b/l; 4+ pitting LE edema   Skin: normal color and turgor; facial rash with hydrocortisone cream   Neurological: AAOx3    MEDICATIONS:  MEDICATIONS  (STANDING):  albumin human 25% IVPB 50 milliLiter(s) IV Intermittent every 6 hours  benzocaine 15 mG/menthol 3.6 mG Lozenge 1 Lozenge Oral every 4 hours  doxazosin 4 milliGRAM(s) Oral at bedtime  FIRST- Mouthwash  BLM 15 milliLiter(s) Swish and Spit every 6 hours  furosemide   Injectable 40 milliGRAM(s) IV Push daily  hydrocortisone 2.5% Cream 1 Application(s) Topical two times a day  influenza   Vaccine 0.5 milliLiter(s) IntraMuscular once  loratadine 10 milliGRAM(s) Oral daily  meropenem  IVPB 1000 milliGRAM(s) IV Intermittent every 12 hours  meropenem  IVPB      pantoprazole    Tablet 40 milliGRAM(s) Oral before breakfast  polyethylene glycol 3350 17 Gram(s) Oral daily    MEDICATIONS  (PRN):  acetaminophen   Tablet .. 650 milliGRAM(s) Oral every 6 hours PRN Temp greater or equal to 38C (100.4F), Mild Pain (1 - 3), Moderate Pain (4 - 6)  benzonatate 100 milliGRAM(s) Oral three times a day PRN Cough  Biotene Dry Mouth Oral Rinse 5 milliLiter(s) Swish and Spit every 4 hours PRN Mouth Care  methyl salicylate 14%/menthol 6% Topical Ointment 1 Application(s) Topical two times a day PRN pain  Saliva Substitute (CAPHOSOL) 30 milliLiter(s) Swish and Spit two times a day PRN dry mouth  simethicone 80 milliGRAM(s) Chew every 4 hours PRN Gas  sodium chloride 0.65% Nasal 1 Spray(s) Both Nostrils once PRN Nasal Congestion      ALLERGIES:  Allergies    sulfa drugs (Unknown)    Intolerances        LABS:                        10.2   20.4  )-----------( 87       ( 01 Oct 2018 07:23 )             31.9     10-01    137  |  103  |  55<H>  ----------------------------<  148<H>  5.1   |  15<L>  |  1.98<H>    Ca    7.9<L>      01 Oct 2018 17:07    TPro  4.7<L>  /  Alb  1.8<L>  /  TBili  1.1  /  DBili  x   /  AST  311<H>  /  ALT  112<H>  /  AlkPhos  526<H>  10-01          RADIOLOGY & ADDITIONAL TESTS: Reviewed.

## 2018-10-02 NOTE — PROGRESS NOTE ADULT - PROBLEM SELECTOR PLAN 9
VTE: on lovenox (hold for today)  Diet: regular   Dispo: home with home PT, needs shower chair and rolling walker will hold antihypertensives in setting of sepsis and hypotension  - can restart meds if indicated

## 2018-10-02 NOTE — PROGRESS NOTE ADULT - SUBJECTIVE AND OBJECTIVE BOX
INTERVAL HPI/OVERNIGHT EVENTS:    Patient with continued dyspnea. Pending paracentesis. Labs showing worsening ROSEMARY and lactic acidosis.    VITAL SIGNS:  T(F): 97.1 (10-02-18 @ 13:00)  HR: 103 (10-02-18 @ 12:45)  BP: 120/61 (10-02-18 @ 12:45)  RR: 29 (10-02-18 @ 12:45)  SpO2: 100% (10-02-18 @ 12:45)  Wt(kg): --    PHYSICAL EXAM:    Constitutional: anasarca, tachypnea  Eyes: EOMI, sclera non-icteric  Neck: supple, no masses, no JVD  Respiratory: shallow breathing but CTA   Cardiovascular: tachycardic, no M/R/G  Gastrointestinal: distended, NT  Extremities: no c/c/e  Neurological: AAOx3      MEDICATIONS  (STANDING):  albumin human 25% IVPB 100 milliLiter(s) IV Intermittent every 6 hours  meropenem  IVPB 1000 milliGRAM(s) IV Intermittent every 12 hours  meropenem  IVPB      norepinephrine Infusion 0.05 MICROgram(s)/kG/Min (8.766 mL/Hr) IV Continuous <Continuous>  pantoprazole  Injectable 40 milliGRAM(s) IV Push daily  phenylephrine    Infusion 2 MICROgram(s)/kG/Min (70.125 mL/Hr) IV Continuous <Continuous>    MEDICATIONS  (PRN):      Allergies    sulfa drugs (Unknown)    Intolerances        LABS:                        9.0    32.8  )-----------( 109      ( 02 Oct 2018 10:42 )             29.9     10-02    138  |  102  |  64<H>  ----------------------------<  132<H>  5.7<H>   |  11<L>  |  2.71<H>    Ca    8.1<L>      02 Oct 2018 10:42  Phos  8.0     10-02  Mg     2.8     10-02    TPro  4.9<L>  /  Alb  2.5<L>  /  TBili  1.4<H>  /  DBili  x   /  AST  449<H>  /  ALT  113<H>  /  AlkPhos  357<H>  10-02    PT/INR - ( 02 Oct 2018 10:42 )   PT: 20.2 sec;   INR: 1.83 ratio         PTT - ( 02 Oct 2018 10:42 )  PTT:85.7 sec      RADIOLOGY & ADDITIONAL TESTS:  Studies reviewed.    ASSESSMENT & PLAN:

## 2018-10-02 NOTE — CONSULT NOTE ADULT - SUBJECTIVE AND OBJECTIVE BOX
[facility] DIVISION OF KIDNEY DISEASES AND HYPERTENSION -- [phone]  -- INITIAL CONSULT NOTE  --------------------------------------------------------------------------------  HPI: 68 M with medical history of metastatic colo ca (currently on chemo, last was 2 weeks ago), HTN, chronic ascitics admitted for fever. Nephrology consulted for ROSEMARY. Initially came complaining of  weakness, worsening cough, fevers that started 3 days before admission. 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 Throughout his hospitalization, patient has been experiencing continuously progressive abdominal distention, and worsening renal function. Pt developed worsening dyspnea became hypotensive and was started on Albumin for concern for hepatorenal syndrome 10/2/18.   On lab review at [facility] SCr WNL 0.9 on 4/14/18, 1 on 9/9/18. At admission SCr elevated to 1.8 on 9/24/18.    Pt was seen and examined at MICU, reports SOB, feels weak.     PAST HISTORY  --------------------------------------------------------------------------------  PAST MEDICAL & SURGICAL HISTORY:  Malignant neoplasm of colon, unspecified part of colon  HTN (hypertension)  History of tonsillectomy    FAMILY HISTORY:  Family history of breast cancer in mother (Mother)    PAST SOCIAL HISTORY:    ALLERGIES & MEDICATIONS  --------------------------------------------------------------------------------  Allergies    sulfa drugs (Unknown)    Intolerances      Standing Inpatient Medications  albumin human 25% IVPB 100 milliLiter(s) IV Intermittent every 6 hours  meropenem  IVPB 1000 milliGRAM(s) IV Intermittent every 12 hours  meropenem  IVPB      norepinephrine Infusion 0.05 MICROgram(s)/kG/Min IV Continuous <Continuous>  pantoprazole  Injectable 40 milliGRAM(s) IV Push daily  phenylephrine    Infusion 2 MICROgram(s)/kG/Min IV Continuous <Continuous>    PRN Inpatient Medications      REVIEW OF SYSTEMS  --------------------------------------------------------------------------------  Gen: No fevers  Skin: No rashes  Respiratory: + dyspnea, cough  CV: No chest pain  GI: +abdominal pain, No diarrhea, constipation, nausea, vomiting  : No dysuria, hematuria  MSK:+  edema  Heme: No easy bruising or bleeding  Psych: No significant depression    All other systems were reviewed and are negative, except as noted.    VITALS/PHYSICAL EXAM  --------------------------------------------------------------------------------  T(C): 36.2 (10-02-18 @ 13:00), Max: 36.7 (10-01-18 @ 19:02)  HR: 92 (10-02-18 @ 16:00) (85 - 106)  BP: 99/54 (10-02-18 @ 16:00) (70/39 - 123/55)  RR: 30 (10-02-18 @ 16:00) (18 - 33)  SpO2: 100% (10-02-18 @ 16:00) (92% - 100%)  Wt(kg): --        10-01-18 @ 07:01  -  10-02-18 @ 07:00  --------------------------------------------------------  IN: 1330 mL / OUT: 200 mL / NET: 1130 mL    10-02-18 @ 07:01  -  10-02-18 @ 16:22  --------------------------------------------------------  IN: 491.5 mL / OUT: 5 mL / NET: 486.5 mL      Physical Exam:  	Gen: Tachypneic  	HEENT: Dry mucous membranes. On non rebreather  	Pulm: decrease breath sounds B/L  	CV: S1S2  	Abd: Soft, +BS   	Ext: 3+ pitting LE edema B/L  	Neuro: Awake  	Skin: Dry    LABS/STUDIES  --------------------------------------------------------------------------------              9.0    32.8  >-----------<  109      [10-02-18 @ 10:42]              29.9     138  |  102  |  64  ----------------------------<  132      [10-02-18 @ 10:42]  5.7   |  11  |  2.71        Ca     8.1     [10-02-18 @ 10:42]      Mg     2.8     [10-02-18 @ 10:42]      Phos  8.0     [10-02-18 @ 10:42]    TPro  4.9  /  Alb  2.5  /  TBili  1.4  /  DBili  x   /  AST  449  /  ALT  113  /  AlkPhos  357  [10-02-18 @ 10:42]    PT/INR: PT 20.2 , INR 1.83       [10-02-18 @ 10:42]  PTT: 85.7       [10-02-18 @ 10:42]      Creatinine Trend:  SCr 2.71 [10-02 @ 10:42]  SCr 2.58 [10-02 @ 08:07]  SCr 1.98 [10-01 @ 17:07]  SCr 1.63 [10-01 @ 07:23]  SCr 1.15 [09-29 @ 07:21]    Urinalysis - [09-25-18 @ 08:40]      Color Yellow / Appearance Clear / SG 1.022 / pH 5.5      Gluc Negative / Ketone Negative  / Bili Negative / Urobili Negative       Blood Negative / Protein Trace / Leuk Est Negative / Nitrite Negative      RBC 9 / WBC 1 / Hyaline 12 / Gran 1 / Sq Epi  / Non Sq Epi 1 / Bacteria Negative      HbA1c 5.5      [09-25-18 @ 09:27]  Lipid: chol 69, TG 74, HDL 20, LDL 34      [09-25-18 @ 09:38]

## 2018-10-02 NOTE — PROGRESS NOTE ADULT - ASSESSMENT
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  sustained clinical improvement  deconditioned  likely protein malnutrition  Leukocytosis  Given likely immunosuppression, will extend duration of antibiotics to 3 weeks for listeria bacteremia  Ascites with increasing abd distention  LE edema  Scrotal swelling  Elevated transaminase levels  Repeat Blood cxs NGTD  Afebrile  Leukocytosis, suspect multifactorial given bacteremia, ascites and dyspnea, and received filgrastin x 1   Dypnea   Now transferred to MICU for hypotension and increased work of breathing  On pressors      Antibiotics  Cefepime 9/24  Zosyn 9/24 -->25; 9/26 -->27  Vanco 9/24, 9/25, 9/26  Gent 9/27 -->9/30  AMP 9/27 -->    Suggest  Continue meropenem  If for paracentesis, check cell count with differential and fluid cx to r/o SBP  Monitor renal function while on abx  Trend WBC  Wean off pressors as tolerated  Check blood cxs x 2 sets

## 2018-10-02 NOTE — PROGRESS NOTE ADULT - PROBLEM SELECTOR PLAN 3
Creatinine increasing the past day from 1->->2 with concern for hepatorenal  - will do paracentesis today and monitor creatinine 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    - ampicillin Day 6; gentamicin d/c'd   - As per ID, do not need to remove mediport  - will need at least 3 weeks abx from negative cultures, can use mediport for iv abx outpatient (cleared by onc)  - BC q48h, cultures from 9/27 grew GPR, repeat BC 9/29 NGTD - likely need abx until Oct 20

## 2018-10-02 NOTE — PROGRESS NOTE ADULT - PROBLEM SELECTOR PLAN 1
- may be 2/2 worsening abdominal distension and hypoventilation  - management per medical ICU with plans for paracentesis today

## 2018-10-03 DIAGNOSIS — Z71.89 OTHER SPECIFIED COUNSELING: ICD-10-CM

## 2018-10-03 DIAGNOSIS — F41.9 ANXIETY DISORDER, UNSPECIFIED: ICD-10-CM

## 2018-10-03 DIAGNOSIS — Z51.5 ENCOUNTER FOR PALLIATIVE CARE: ICD-10-CM

## 2018-10-03 DIAGNOSIS — R52 PAIN, UNSPECIFIED: ICD-10-CM

## 2018-10-03 LAB
ALBUMIN SERPL ELPH-MCNC: 3 G/DL — LOW (ref 3.3–5)
ALBUMIN SERPL ELPH-MCNC: 3.1 G/DL — LOW (ref 3.3–5)
ALBUMIN SERPL ELPH-MCNC: 3.1 G/DL — LOW (ref 3.3–5)
ALP SERPL-CCNC: 378 U/L — HIGH (ref 40–120)
ALP SERPL-CCNC: 380 U/L — HIGH (ref 40–120)
ALP SERPL-CCNC: 396 U/L — HIGH (ref 40–120)
ALT FLD-CCNC: 172 U/L — HIGH (ref 10–45)
ALT FLD-CCNC: 189 U/L — HIGH (ref 10–45)
ALT FLD-CCNC: 190 U/L — HIGH (ref 10–45)
ANION GAP SERPL CALC-SCNC: 22 MMOL/L — HIGH (ref 5–17)
ANION GAP SERPL CALC-SCNC: 26 MMOL/L — HIGH (ref 5–17)
ANION GAP SERPL CALC-SCNC: 26 MMOL/L — HIGH (ref 5–17)
APTT BLD: 108.8 SEC — HIGH (ref 27.5–37.4)
APTT BLD: 133.8 SEC — CRITICAL HIGH (ref 27.5–37.4)
APTT BLD: 99.2 SEC — HIGH (ref 27.5–37.4)
AST SERPL-CCNC: 877 U/L — HIGH (ref 10–40)
AST SERPL-CCNC: 946 U/L — HIGH (ref 10–40)
AST SERPL-CCNC: 966 U/L — HIGH (ref 10–40)
BILIRUB SERPL-MCNC: 2.9 MG/DL — HIGH (ref 0.2–1.2)
BILIRUB SERPL-MCNC: 2.9 MG/DL — HIGH (ref 0.2–1.2)
BILIRUB SERPL-MCNC: 3 MG/DL — HIGH (ref 0.2–1.2)
BLD GP AB SCN SERPL QL: NEGATIVE — SIGNIFICANT CHANGE UP
BUN SERPL-MCNC: 50 MG/DL — HIGH (ref 7–23)
BUN SERPL-MCNC: 53 MG/DL — HIGH (ref 7–23)
BUN SERPL-MCNC: 58 MG/DL — HIGH (ref 7–23)
CALCIUM SERPL-MCNC: 7.8 MG/DL — LOW (ref 8.4–10.5)
CALCIUM SERPL-MCNC: 8.2 MG/DL — LOW (ref 8.4–10.5)
CALCIUM SERPL-MCNC: 8.3 MG/DL — LOW (ref 8.4–10.5)
CHLORIDE SERPL-SCNC: 103 MMOL/L — SIGNIFICANT CHANGE UP (ref 96–108)
CHLORIDE SERPL-SCNC: 105 MMOL/L — SIGNIFICANT CHANGE UP (ref 96–108)
CHLORIDE SERPL-SCNC: 105 MMOL/L — SIGNIFICANT CHANGE UP (ref 96–108)
CO2 SERPL-SCNC: 10 MMOL/L — CRITICAL LOW (ref 22–31)
CO2 SERPL-SCNC: 15 MMOL/L — LOW (ref 22–31)
CO2 SERPL-SCNC: 8 MMOL/L — CRITICAL LOW (ref 22–31)
CREAT SERPL-MCNC: 2.46 MG/DL — HIGH (ref 0.5–1.3)
CREAT SERPL-MCNC: 2.61 MG/DL — HIGH (ref 0.5–1.3)
CREAT SERPL-MCNC: 2.74 MG/DL — HIGH (ref 0.5–1.3)
GAS PNL BLDA: SIGNIFICANT CHANGE UP
GLUCOSE BLDC GLUCOMTR-MCNC: 169 MG/DL — HIGH (ref 70–99)
GLUCOSE SERPL-MCNC: 118 MG/DL — HIGH (ref 70–99)
GLUCOSE SERPL-MCNC: 61 MG/DL — LOW (ref 70–99)
GLUCOSE SERPL-MCNC: 93 MG/DL — SIGNIFICANT CHANGE UP (ref 70–99)
GRAM STN FLD: SIGNIFICANT CHANGE UP
HCT VFR BLD CALC: 26.5 % — LOW (ref 39–50)
HCT VFR BLD CALC: 30.5 % — LOW (ref 39–50)
HCT VFR BLD CALC: 30.7 % — LOW (ref 39–50)
HGB BLD-MCNC: 8.1 G/DL — LOW (ref 13–17)
HGB BLD-MCNC: 9.4 G/DL — LOW (ref 13–17)
HGB BLD-MCNC: 9.4 G/DL — LOW (ref 13–17)
INR BLD: 2.06 RATIO — HIGH (ref 0.88–1.16)
INR BLD: 2.42 RATIO — HIGH (ref 0.88–1.16)
INR BLD: 2.67 RATIO — HIGH (ref 0.88–1.16)
MAGNESIUM SERPL-MCNC: 2.9 MG/DL — HIGH (ref 1.6–2.6)
MAGNESIUM SERPL-MCNC: 2.9 MG/DL — HIGH (ref 1.6–2.6)
MAGNESIUM SERPL-MCNC: 3 MG/DL — HIGH (ref 1.6–2.6)
MCHC RBC-ENTMCNC: 28.1 PG — SIGNIFICANT CHANGE UP (ref 27–34)
MCHC RBC-ENTMCNC: 28.5 PG — SIGNIFICANT CHANGE UP (ref 27–34)
MCHC RBC-ENTMCNC: 29.5 PG — SIGNIFICANT CHANGE UP (ref 27–34)
MCHC RBC-ENTMCNC: 30.4 GM/DL — LOW (ref 32–36)
MCHC RBC-ENTMCNC: 30.7 GM/DL — LOW (ref 32–36)
MCHC RBC-ENTMCNC: 30.8 GM/DL — LOW (ref 32–36)
MCV RBC AUTO: 91.4 FL — SIGNIFICANT CHANGE UP (ref 80–100)
MCV RBC AUTO: 93.5 FL — SIGNIFICANT CHANGE UP (ref 80–100)
MCV RBC AUTO: 96.3 FL — SIGNIFICANT CHANGE UP (ref 80–100)
NIGHT BLUE STAIN TISS: SIGNIFICANT CHANGE UP
PHOSPHATE SERPL-MCNC: 5.4 MG/DL — HIGH (ref 2.5–4.5)
PHOSPHATE SERPL-MCNC: 5.8 MG/DL — HIGH (ref 2.5–4.5)
PHOSPHATE SERPL-MCNC: 8.1 MG/DL — HIGH (ref 2.5–4.5)
PLATELET # BLD AUTO: 104 K/UL — LOW (ref 150–400)
PLATELET # BLD AUTO: 112 K/UL — LOW (ref 150–400)
PLATELET # BLD AUTO: 96 K/UL — LOW (ref 150–400)
POTASSIUM SERPL-MCNC: 5.4 MMOL/L — HIGH (ref 3.5–5.3)
POTASSIUM SERPL-MCNC: 5.4 MMOL/L — HIGH (ref 3.5–5.3)
POTASSIUM SERPL-MCNC: 5.9 MMOL/L — HIGH (ref 3.5–5.3)
POTASSIUM SERPL-SCNC: 5.4 MMOL/L — HIGH (ref 3.5–5.3)
POTASSIUM SERPL-SCNC: 5.4 MMOL/L — HIGH (ref 3.5–5.3)
POTASSIUM SERPL-SCNC: 5.9 MMOL/L — HIGH (ref 3.5–5.3)
PROT SERPL-MCNC: 4.7 G/DL — LOW (ref 6–8.3)
PROT SERPL-MCNC: 5.1 G/DL — LOW (ref 6–8.3)
PROT SERPL-MCNC: 5.5 G/DL — LOW (ref 6–8.3)
PROTHROM AB SERPL-ACNC: 22.8 SEC — HIGH (ref 9.8–12.7)
PROTHROM AB SERPL-ACNC: 26.9 SEC — HIGH (ref 9.8–12.7)
PROTHROM AB SERPL-ACNC: 29.4 SEC — HIGH (ref 9.8–12.7)
RBC # BLD: 2.84 M/UL — LOW (ref 4.2–5.8)
RBC # BLD: 3.16 M/UL — LOW (ref 4.2–5.8)
RBC # BLD: 3.35 M/UL — LOW (ref 4.2–5.8)
RBC # FLD: 19.7 % — HIGH (ref 10.3–14.5)
RBC # FLD: 23.9 % — HIGH (ref 10.3–14.5)
RBC # FLD: 24.2 % — HIGH (ref 10.3–14.5)
RH IG SCN BLD-IMP: POSITIVE — SIGNIFICANT CHANGE UP
SODIUM SERPL-SCNC: 139 MMOL/L — SIGNIFICANT CHANGE UP (ref 135–145)
SODIUM SERPL-SCNC: 140 MMOL/L — SIGNIFICANT CHANGE UP (ref 135–145)
SODIUM SERPL-SCNC: 141 MMOL/L — SIGNIFICANT CHANGE UP (ref 135–145)
SPECIMEN SOURCE: SIGNIFICANT CHANGE UP
SPECIMEN SOURCE: SIGNIFICANT CHANGE UP
VANCOMYCIN FLD-MCNC: 9.9 UG/ML — SIGNIFICANT CHANGE UP
WBC # BLD: 36.1 K/UL — HIGH (ref 3.8–10.5)
WBC # BLD: 41.5 K/UL — CRITICAL HIGH (ref 3.8–10.5)
WBC # BLD: 43.6 K/UL — CRITICAL HIGH (ref 3.8–10.5)
WBC # FLD AUTO: 36.1 K/UL — HIGH (ref 3.8–10.5)
WBC # FLD AUTO: 41.5 K/UL — CRITICAL HIGH (ref 3.8–10.5)
WBC # FLD AUTO: 43.6 K/UL — CRITICAL HIGH (ref 3.8–10.5)

## 2018-10-03 PROCEDURE — 99232 SBSQ HOSP IP/OBS MODERATE 35: CPT

## 2018-10-03 PROCEDURE — 76604 US EXAM CHEST: CPT | Mod: 26,GC

## 2018-10-03 PROCEDURE — 99291 CRITICAL CARE FIRST HOUR: CPT | Mod: 25

## 2018-10-03 PROCEDURE — 99222 1ST HOSP IP/OBS MODERATE 55: CPT

## 2018-10-03 PROCEDURE — 71045 X-RAY EXAM CHEST 1 VIEW: CPT | Mod: 26

## 2018-10-03 PROCEDURE — 93308 TTE F-UP OR LMTD: CPT | Mod: 26,GC

## 2018-10-03 PROCEDURE — 99497 ADVNCD CARE PLAN 30 MIN: CPT | Mod: 25,GC

## 2018-10-03 PROCEDURE — 99232 SBSQ HOSP IP/OBS MODERATE 35: CPT | Mod: GC

## 2018-10-03 PROCEDURE — 99221 1ST HOSP IP/OBS SF/LOW 40: CPT | Mod: GC

## 2018-10-03 PROCEDURE — 99233 SBSQ HOSP IP/OBS HIGH 50: CPT

## 2018-10-03 RX ORDER — VANCOMYCIN HCL 1 G
1000 VIAL (EA) INTRAVENOUS ONCE
Qty: 0 | Refills: 0 | Status: COMPLETED | OUTPATIENT
Start: 2018-10-03 | End: 2018-10-03

## 2018-10-03 RX ORDER — PHENYLEPHRINE HYDROCHLORIDE 10 MG/ML
1 INJECTION INTRAVENOUS
Qty: 160 | Refills: 0 | Status: DISCONTINUED | OUTPATIENT
Start: 2018-10-03 | End: 2018-10-03

## 2018-10-03 RX ORDER — FENTANYL CITRATE 50 UG/ML
12.5 INJECTION INTRAVENOUS ONCE
Qty: 0 | Refills: 0 | Status: DISCONTINUED | OUTPATIENT
Start: 2018-10-03 | End: 2018-10-03

## 2018-10-03 RX ORDER — ROBINUL 0.2 MG/ML
0.4 INJECTION INTRAMUSCULAR; INTRAVENOUS EVERY 6 HOURS
Qty: 0 | Refills: 0 | Status: DISCONTINUED | OUTPATIENT
Start: 2018-10-03 | End: 2018-10-03

## 2018-10-03 RX ORDER — HYDROMORPHONE HYDROCHLORIDE 2 MG/ML
0.5 INJECTION INTRAMUSCULAR; INTRAVENOUS; SUBCUTANEOUS ONCE
Qty: 0 | Refills: 0 | Status: DISCONTINUED | OUTPATIENT
Start: 2018-10-03 | End: 2018-10-03

## 2018-10-03 RX ORDER — HYDROMORPHONE HYDROCHLORIDE 2 MG/ML
0.5 INJECTION INTRAMUSCULAR; INTRAVENOUS; SUBCUTANEOUS
Qty: 0 | Refills: 0 | Status: DISCONTINUED | OUTPATIENT
Start: 2018-10-03 | End: 2018-10-03

## 2018-10-03 RX ORDER — HEPARIN SODIUM 5000 [USP'U]/ML
700 INJECTION INTRAVENOUS; SUBCUTANEOUS
Qty: 25000 | Refills: 0 | Status: DISCONTINUED | OUTPATIENT
Start: 2018-10-03 | End: 2018-10-03

## 2018-10-03 RX ORDER — PANTOPRAZOLE SODIUM 20 MG/1
8 TABLET, DELAYED RELEASE ORAL
Qty: 80 | Refills: 0 | Status: DISCONTINUED | OUTPATIENT
Start: 2018-10-03 | End: 2018-10-03

## 2018-10-03 RX ORDER — ACETAMINOPHEN 500 MG
650 TABLET ORAL EVERY 6 HOURS
Qty: 0 | Refills: 0 | Status: DISCONTINUED | OUTPATIENT
Start: 2018-10-03 | End: 2018-10-03

## 2018-10-03 RX ORDER — MORPHINE SULFATE 50 MG/1
1 CAPSULE, EXTENDED RELEASE ORAL ONCE
Qty: 0 | Refills: 0 | Status: DISCONTINUED | OUTPATIENT
Start: 2018-10-03 | End: 2018-10-03

## 2018-10-03 RX ORDER — PHYTONADIONE (VIT K1) 5 MG
10 TABLET ORAL ONCE
Qty: 0 | Refills: 0 | Status: COMPLETED | OUTPATIENT
Start: 2018-10-03 | End: 2018-10-03

## 2018-10-03 RX ORDER — DEXTROSE 50 % IN WATER 50 %
50 SYRINGE (ML) INTRAVENOUS ONCE
Qty: 0 | Refills: 0 | Status: COMPLETED | OUTPATIENT
Start: 2018-10-03 | End: 2018-10-03

## 2018-10-03 RX ORDER — SODIUM BICARBONATE 1 MEQ/ML
0.12 SYRINGE (ML) INTRAVENOUS
Qty: 150 | Refills: 0 | Status: DISCONTINUED | OUTPATIENT
Start: 2018-10-03 | End: 2018-10-03

## 2018-10-03 RX ORDER — HYDROCORTISONE 20 MG
100 TABLET ORAL EVERY 8 HOURS
Qty: 0 | Refills: 0 | Status: DISCONTINUED | OUTPATIENT
Start: 2018-10-03 | End: 2018-10-03

## 2018-10-03 RX ADMIN — Medication 102 MILLIGRAM(S): at 05:01

## 2018-10-03 RX ADMIN — Medication 4.38 MICROGRAM(S)/KG/MIN: at 05:01

## 2018-10-03 RX ADMIN — HYDROMORPHONE HYDROCHLORIDE 0.5 MILLIGRAM(S): 2 INJECTION INTRAMUSCULAR; INTRAVENOUS; SUBCUTANEOUS at 12:53

## 2018-10-03 RX ADMIN — PHENYLEPHRINE HYDROCHLORIDE 17.53 MICROGRAM(S)/KG/MIN: 10 INJECTION INTRAVENOUS at 05:08

## 2018-10-03 RX ADMIN — PHENYLEPHRINE HYDROCHLORIDE 70.12 MICROGRAM(S)/KG/MIN: 10 INJECTION INTRAVENOUS at 05:06

## 2018-10-03 RX ADMIN — FENTANYL CITRATE 12.5 MICROGRAM(S): 50 INJECTION INTRAVENOUS at 06:34

## 2018-10-03 RX ADMIN — ROBINUL 0.4 MILLIGRAM(S): 0.2 INJECTION INTRAMUSCULAR; INTRAVENOUS at 18:41

## 2018-10-03 RX ADMIN — Medication 4.38 MICROGRAM(S)/KG/MIN: at 09:00

## 2018-10-03 RX ADMIN — HEPARIN SODIUM 700 UNIT(S)/HR: 5000 INJECTION INTRAVENOUS; SUBCUTANEOUS at 01:15

## 2018-10-03 RX ADMIN — FENTANYL CITRATE 12.5 MICROGRAM(S): 50 INJECTION INTRAVENOUS at 06:50

## 2018-10-03 RX ADMIN — Medication 50 MILLILITER(S): at 04:35

## 2018-10-03 RX ADMIN — PANTOPRAZOLE SODIUM 10 MG/HR: 20 TABLET, DELAYED RELEASE ORAL at 09:23

## 2018-10-03 RX ADMIN — Medication 100 MILLILITER(S): at 11:34

## 2018-10-03 RX ADMIN — PHENYLEPHRINE HYDROCHLORIDE 17.53 MICROGRAM(S)/KG/MIN: 10 INJECTION INTRAVENOUS at 09:23

## 2018-10-03 RX ADMIN — Medication 75 MEQ/KG/HR: at 09:23

## 2018-10-03 RX ADMIN — MEROPENEM 100 MILLIGRAM(S): 1 INJECTION INTRAVENOUS at 06:17

## 2018-10-03 RX ADMIN — Medication 100 MILLILITER(S): at 05:48

## 2018-10-03 RX ADMIN — Medication 100 MILLIGRAM(S): at 09:00

## 2018-10-03 RX ADMIN — Medication 250 MILLIGRAM(S): at 09:00

## 2018-10-03 RX ADMIN — Medication 4.38 MICROGRAM(S)/KG/MIN: at 00:53

## 2018-10-03 NOTE — CONSULT NOTE ADULT - PROBLEM SELECTOR RECOMMENDATION 6
Palliative care philosophy was introduced to the family  Family had GOC discussions initiated by the primary team  Management will be based on symptoms only   No further labs, pressors, HD, or invasive interventions  DNR/DNI/ no feeding   Transfer to PCU when bed available Palliative care philosophy was introduced to the family  Family had GOC discussions initiated by the primary team  Management will be based on symptoms only   No further labs, HD, or invasive interventions  capped pressors   DNR/DNI/ no feeding   Transfer to PCU when bed available Palliative care philosophy was introduced to the family  Family had GOC discussions initiated by the primary team  Management will be based on symptoms only. cap pressors  No further labs, HD, or invasive interventions  capped pressors   DNR/DNI/ no feeding   Transfer to PCU when bed available

## 2018-10-03 NOTE — CONSULT NOTE ADULT - PROBLEM SELECTOR RECOMMENDATION 2
Recommend Dilaudid 0.5 mg Q2H PRN for dyspnea  Recommend Robinul 0.4 mg Q6H for secretions  C/w O2 therapy via nasal canula Recommend Dilaudid 0.5 mg Q2H PRN for dyspnea  Recommend Robinul 0.4 mg Q6H PRN for secretions  C/w O2 therapy via nasal canula

## 2018-10-03 NOTE — PROGRESS NOTE ADULT - PROVIDER SPECIALTY LIST ADULT
Heme/Onc
Infectious Disease
Internal Medicine
Intervent Radiology
Nephrology
Palliative Care
Infectious Disease
MICU
Internal Medicine
Heme/Onc

## 2018-10-03 NOTE — CONSULT NOTE ADULT - ATTENDING COMMENTS
as above-  I am uncertain about significance of blood cutlure isolates-  Add vanco  await susceptibilities  repeat blood cultures    discussed with primary attending
Not intubated, responsive on O2  1.  ARF--hepatorenal vs ATN now anuric.  Indications for RRT.  Given 2, opt for CVVHDF.  Consent obtained  2.  Acidosis--lactic.  Volume, hemodynamic optimization.  Liver failure contribution  3.  Hypotension--levofed likely better agent with possible HRS.  Keep albumin>3
Agree with above.   68 M w/ metastatic colon cancer on Vectibix + irinotecan admitted with sepsis.   - diagnostic paracentesis to be done  - IVF support  - empiric Ab  - cultures pending   - chemo on hold   - f/u with Dr. Casey upon d/c
Patient seen and examined and agree with the above documentation with the following additions:   Patient with metastatic colon cancer, now with multiple complications including septic shock picture and worsening renal function, requiring pressor support. Discussed with wife today about goals of care; decision had been made after discussion with primary team and consultant service about transitioning focus of care to symptom management. No further IV draws, cap pressors. DNR/DNI. Transfer to PCU when bed available. Symptom management as above.

## 2018-10-03 NOTE — PROGRESS NOTE ADULT - PROBLEM SELECTOR PLAN 1
- worsening as ICU team unable to perform CVVHD 2/2 hypotension requiring pressor support  - patient's wife interested in keeping patient comfortable at this time, acknowledging that he is in the process of passing

## 2018-10-03 NOTE — PROGRESS NOTE ADULT - ASSESSMENT
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    Ascites with increasing abd distention  LE edema  Scrotal swelling  Rising transaminase levels  Repeat Blood cxs sent  Afebrile  Leukocytosis to 43K  Dypnea   Remains on pressors  Lactate now 13.8 - ?ischemic bowel  Paracentesis negative for SBP  Coffee ground emesis    Antibiotics  Cefepime 9/24  Zosyn 9/24 -->25; 9/26 -->27  Vanco 9/24, 9/25, 9/26  Gent 9/27 -->9/30  AMP 9/27 --> 10/1  Meropenem 10/1 --> 10/3    Suggest  Antibiotics now discontinued  F/U Palliative consult for GOC evaluation   Family indicating they would like to pursue comfort measures - discussed with MICU team    ID will remain available for any questions.

## 2018-10-03 NOTE — PROGRESS NOTE ADULT - PROBLEM SELECTOR PLAN 1
Pt with ROSEMARY in the setting of hepatorenal physiology, hypotension. On lab review at [facility] SCr WNL 0.9 on 4/14/18, 1 on 9/9/18. At admission SCr elevated to 1.8 on 9/24/18 improved to 1.1 on 9/29/18 peaked to 2.71 today 10/3/18 and stable to 2.46 today 10/3/18. Check UA, urine electrolytes, urine creatinine, urine urea. Continue CVVHDF due to fluid overload, acidosis. Monitor UOP and daily weights. Avoid volume depletion, NSAIDs, ARB/ACE-I. Dose medications as per eGRF.

## 2018-10-03 NOTE — CONSULT NOTE ADULT - SUBJECTIVE AND OBJECTIVE BOX
Chief Complaint:  Patient is a 68y old  Male who presents with a chief complaint of sepsis (03 Oct 2018 08:49)      HPI:  The patient is a 68M PMHx metastatic colon cancer (currently on chemo, last was 2 weeks ago), HTN, who presented to the ED for weakness, worsening cough, fevers to 103 patient is now admitted for sepsis 2/2 Listeria bacteremia, course complicated by newly diagnosed right portal and hepatic vein thrombosis, admitted to the MICU for increased work of breathing and hypotension.  Overnight the patient developed multiple episodes of coffee ground emesis and NGT was placed in which 1.8 L of coffee ground liquid cam out with a one point drop in hemoglobin from 9 to 8 (repeat again 9).  GI was consulted for GI bleed.  Of note patients WBC count is >40 and lactate of 13.  Family at this time has made the patient DNR/DNI and are pursuing comfort measures.    Allergies:  sulfa drugs (Unknown)      Home Medications:    Hospital Medications:  albumin human 25% IVPB 100 milliLiter(s) IV Intermittent every 6 hours  hydrocortisone sodium succinate Injectable 100 milliGRAM(s) IV Push every 8 hours  meropenem  IVPB 1000 milliGRAM(s) IV Intermittent every 12 hours  meropenem  IVPB      norepinephrine Infusion 0.05 MICROgram(s)/kG/Min IV Continuous <Continuous>  pantoprazole Infusion 8 mG/Hr IV Continuous <Continuous>  phenylephrine    Infusion 1 MICROgram(s)/kG/Min IV Continuous <Continuous>  phenylephrine    Infusion 2 MICROgram(s)/kG/Min IV Continuous <Continuous>  sodium bicarbonate  Infusion 0.12 mEq/kG/Hr IV Continuous <Continuous>      PMHX/PSHX:  Malignant neoplasm of colon, unspecified part of colon  HTN (hypertension)  History of tonsillectomy      Family history:  Family history of breast cancer in mother (Mother)      Social History:     ROS:     General:  No wt loss, fevers, chills, night sweats, fatigue,   Eyes:  Good vision, no reported pain  ENT:  No sore throat, pain, runny nose, dysphagia  CV:  No pain, palpitations, hypo/hypertension  Resp:  No dyspnea, cough, tachypnea, wheezing  GI:  No pain, No nausea, No vomiting, No diarrhea, No constipation, No weight loss, No fever, No pruritis, No rectal bleeding, No tarry stools, No dysphagia,  :  No pain, bleeding, incontinence, nocturia  Muscle:  No pain, weakness  Neuro:  No weakness, tingling, memory problems  Psych:  No fatigue, insomnia, mood problems, depression  Endocrine:  No polyuria, polydipsia, cold/heat intolerance  Heme:  No petechiae, ecchymosis, easy bruisability  Skin:  No rash, tattoos, scars, edema      PHYSICAL EXAM:     GENERAL:  NGT in place, not attached to suction, with coffe ground in tube  ABDOMEN:  Soft, non-tender, non-distended, normoactive bowel sounds,  no masses ,no hepato-splenomegaly, no signs of chronic liver disease  EXTEREMITIES:  no cyanosis,clubbing or edema  NEURO:  Alert, oriented, no tremor    Vital Signs:  Vital Signs Last 24 Hrs  T(C): 36.1 (03 Oct 2018 07:45), Max: 36.5 (02 Oct 2018 16:00)  T(F): 97 (03 Oct 2018 07:45), Max: 97.7 (02 Oct 2018 16:00)  HR: 110 (03 Oct 2018 10:30) (89 - 111)  BP: 96/48 (03 Oct 2018 10:30) (63/37 - 138/94)  BP(mean): 69 (03 Oct 2018 10:30) (43 - 111)  RR: 29 (03 Oct 2018 10:30) (17 - 33)  SpO2: 90% (03 Oct 2018 10:30) (89% - 100%)  Daily     Daily Weight in k.8 (03 Oct 2018 04:00)    LABS:                        9.4    36.1  )-----------( 96       ( 03 Oct 2018 09:02 )             30.5     Mean Cell Volume: 96.3 fl (10-03-18 @ 09:02)    10    139  |  105  |  53<H>  ----------------------------<  93  5.9<H>   |  8<LL>  |  2.74<H>    Ca    7.8<L>      03 Oct 2018 09:02  Phos  8.1     10-  Mg     2.9     10    TPro  4.7<L>  /  Alb  3.0<L>  /  TBili  3.0<H>  /  DBili  x   /  AST  946<H>  /  ALT  190<H>  /  AlkPhos  380<H>  10-03    LIVER FUNCTIONS - ( 03 Oct 2018 09:02 )  Alb: 3.0 g/dL / Pro: 4.7 g/dL / ALK PHOS: 380 U/L / ALT: 190 U/L / AST: 946 U/L / GGT: x           PT/INR - ( 03 Oct 2018 09:02 )   PT: 29.4 sec;   INR: 2.67 ratio         PTT - ( 03 Oct 2018 09:02 )  PTT:99.2 sec                            9.4    36.1  )-----------( 96       ( 03 Oct 2018 09:02 )             30.5                         8.1    41.5  )-----------( 104      ( 03 Oct 2018 04:42 )             26.5                         9.4    43.6  )-----------( 112      ( 03 Oct 2018 00:14 )             30.7                         8.9    31.3  )-----------( 117      ( 02 Oct 2018 16:44 )             30.1                         9.0    32.8  )-----------( 109      ( 02 Oct 2018 10:42 )             29.9     Imaging:

## 2018-10-03 NOTE — CHART NOTE - NSCHARTNOTEFT_GEN_A_CORE
: Santhosh Caruso    INDICATION: shock, hypoxic resp failure    PROCEDURE:  [x] LIMITED ECHO  [x] LIMITED CHEST  [ ] LIMITED RETROPERITONEAL  [ ] LIMITED ABDOMINAL  [ ] LIMITED DVT  [ ] NEEDLE GUIDANCE VASCULAR  [ ] NEEDLE GUIDANCE THORACENTESIS  [ ] NEEDLE GUIDANCE PARACENTESIS  [ ] NEEDLE GUIDANCE PERICARDIOCENTESIS  [ ] OTHER    FINDINGS:  Chest: predominantly Alines, small pleural effusion b/l, small alveolar consolidations bilaterally; ascites   Echo: tachycardia, end systolic effacement; normal systolic function, no septal straightening; no pericardial effusions    INTERPRETATION:  - likely has atelectasis  - normal systolic function, tachycardic, no pericardial effusion to explain shock    ----------------------------------------  Pippa Caruso MD PGY-5  Pulmonary/Critical Care Fellow  Pager # [phone] (NS), [phone] ([facility]) : Santhosh Caruso    INDICATION: shock, hypoxic respiratory failure    PROCEDURE:  [x] LIMITED ECHO  [x] LIMITED CHEST  [ ] LIMITED RETROPERITONEAL  [ ] LIMITED ABDOMINAL  [ ] LIMITED DVT  [ ] NEEDLE GUIDANCE VASCULAR  [ ] NEEDLE GUIDANCE THORACENTESIS  [ ] NEEDLE GUIDANCE PARACENTESIS  [ ] NEEDLE GUIDANCE PERICARDIOCENTESIS  [ ] OTHER    FINDINGS:  Chest: predominantly Alines, small pleural effusion b/l, small alveolar consolidations bilaterally; ascites   Echo: tachycardia, end systolic effacement; normal systolic function, no septal straightening; no pericardial effusions    INTERPRETATION:  - likely has atelectasis  - normal systolic function, tachycardic, no pericardial effusion to explain shock    ----------------------------------------  Pippa Caruso MD PGY-5  Pulmonary/Critical Care Fellow  Pager # [phone] ([facility]), [phone] ([facility])

## 2018-10-03 NOTE — PROGRESS NOTE ADULT - SUBJECTIVE AND OBJECTIVE BOX
INTERVAL HPI/OVERNIGHT EVENTS:  Patient somnolent when seen. Patient's wife at bedside. She has elected for patient to be DNR/DNI at present.     VITAL SIGNS:  T(F): 97.1 (10-03-18 @ 12:00)  HR: 106 (10-03-18 @ 12:45)  BP: 95/50 (10-03-18 @ 12:45)  RR: 24 (10-03-18 @ 12:45)  SpO2: 92% (10-03-18 @ 12:45)  Wt(kg): --    PHYSICAL EXAM:    Constitutional: somnolent, anasarca  Eyes: EOMI, sclera non-icteric  Neck: supple, no masses, no JVD  Respiratory: CTA b/l, good air entry b/l  Cardiovascular: RRR, no M/R/G  Gastrointestinal: protuberant  Extremities: + LE edema  Neurological: unable to reliably assess      MEDICATIONS  (STANDING):  norepinephrine Infusion 0.05 MICROgram(s)/kG/Min (4.383 mL/Hr) IV Continuous <Continuous>  phenylephrine    Infusion 1 MICROgram(s)/kG/Min (17.531 mL/Hr) IV Continuous <Continuous>  phenylephrine    Infusion 2 MICROgram(s)/kG/Min (70.125 mL/Hr) IV Continuous <Continuous>    MEDICATIONS  (PRN):      Allergies    sulfa drugs (Unknown)    Intolerances        LABS:                        9.4    36.1  )-----------( 96       ( 03 Oct 2018 09:02 )             30.5     10-03    139  |  105  |  53<H>  ----------------------------<  93  5.9<H>   |  8<LL>  |  2.74<H>    Ca    7.8<L>      03 Oct 2018 09:02  Phos  8.1     10-03  Mg     2.9     10-03    TPro  4.7<L>  /  Alb  3.0<L>  /  TBili  3.0<H>  /  DBili  x   /  AST  946<H>  /  ALT  190<H>  /  AlkPhos  380<H>  10-03    PT/INR - ( 03 Oct 2018 09:02 )   PT: 29.4 sec;   INR: 2.67 ratio         PTT - ( 03 Oct 2018 09:02 )  PTT:99.2 sec      RADIOLOGY & ADDITIONAL TESTS:  Studies reviewed.    ASSESSMENT & PLAN:

## 2018-10-03 NOTE — CHART NOTE - NSCHARTNOTEFT_GEN_A_CORE
Called to see patient for unresponsiveness. On exam the patient did not respond to verbal or physical stimuli. Absent heart and breath sounds. Absent peripheral pulses. Pupils are fixed and dilated. Patient pronounced dead at 20:10. Next of kin/family at the bedside.

## 2018-10-03 NOTE — PROGRESS NOTE ADULT - ASSESSMENT
Patient is a 67 yo M with PMHx of metastatic colon cancer (currently on chemo, last was 3 weeks ago), HTN, 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 Will continue to monitor    #Resp  Dyspnea  - patient continues to be significantly dyspneic  - s/p paracentesis   - dyspnea now likely multifactorial 2/2 hemorrhage vs metabolic acidosis  - will continue to closely monitor   - per conversation with patients wife, will focus on symptomatic treatment at this time     #CV  Hypotension   - patient hypotensive to SBP in the 60s  - multifactorial 2/2 hemorrhagic shock vs possible septic shock  - continue with pressor support for now   - will start 100 of albumin q6 x 6 doses  - will continue pressor support as required  - continue to monitor     #GI  Malignant Ascites   - patient with diagnostic para in ED on admission with positive cytopath for malignant ascites  - s/p therapeutic para yesterday with 3300 ccs removed  GIB  - patient with coffee ground emesis overnight, with 3L of coffee grounds suctioned from NGT  - started on Protonix gtt, continue for now  - no plan for EGD at this time as patients GOC is symptom management  Ischemic Gut  - patient with a progressively rising lactate  - given that there are known hepatic/portal vein thrombosis, there is concern that there are emboli causing ischemic gut  - no plan for intervention at this time    #Renal  Hepatorenal Syndrome  - Patient with a greater than 2 fold increase in Cr (with Cr >2.5), and decreased urinary output consistent with Type 1 Hepatorenal Syndrome   - continue pressor support with Levo  - continue 100 of albumin q6 x 6 doses  - patient initially started on CVVHD yesterday, however was held due to his clinically worsening status  - will f/u Renal recs  Metabolic Acidosis  - patient with Bicarb of 8 this AM  - likely 2/2 to lactic acidosis 2/2 ?ischemic gut  - started on a bicarb gtt, continue for now    #Heme/Onc  Malignant Colon Cancer  - patient with Colon cancer with mets to the to the liver, peritoneum, and lung  - s/p 6 cycles of FOLFOX, and was on week 2 of vectibix and irinotecan on admission  - is no longer a candidate for chemo given worsening clinical status   Right portal and hepatic vein thrombosis  - patient with 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 continue to monitor     #ID  Listeria Bacteremia   - patient with listeria bacteremia, with last blood cx on 9/29 NGTD  - As per ID, continue with Meropenem for now  Suspected SBP  - Diagnostic para negative for SBP    #DVT ppx:  - pharmaceutical agents contraindicated in the setting of GIB

## 2018-10-03 NOTE — PROGRESS NOTE ADULT - ATTENDING COMMENTS
Agree with above.   Pt significantly turned for the worse over the last 24 hours. Wife opted for comfort care. Currently actively dying. Wife at bedside.   Emotional support provided.

## 2018-10-03 NOTE — CHART NOTE - NSCHARTNOTEFT_GEN_A_CORE
MICU adm note.    PMHx of metastatic colon cancer, HTN, admitted for sepsis 2/2 Listeria bacteremia, course complicated by newly diagnosed right portal and hepatic vein thrombosis, admitted to the MICU for increased work of breathing and hypotension, ROSEMARY. Overnight  patient had multiple episodes of coffee ground emesis, NGT placed in which 1.8 L of coffee ground liquid removed. Family pursuing comfort measures only. NPO, no nutrition interventions.

## 2018-10-03 NOTE — CONSULT NOTE ADULT - ASSESSMENT
Impression:  1) Coffee ground emesis - 2/2 upper GI bleed ddx includes PUD vs portal gastropathy, angiectasias of the first part of the small bowel.  Currently family pursuing comfort measures and does not want intubation or endoscopic intervention at this time.    Recommendation:   - monitor H/H, transfuse as needed   - IV PPI   - please call GI back with any further questions    Tatum Banks, PGY-4  Gastroenterology Fellow  Pager x [phone] or [phone]  (After 5 pm or on weekends please page GI on call)

## 2018-10-03 NOTE — CONSULT NOTE ADULT - SUBJECTIVE AND OBJECTIVE BOX
HPI:  68M pmh metastatic colo ca (currently on chemo, last was 2 weeks ago), HTN, chronic ascitics now presenting to the ED for weakness, worsening cough, fevers to 103 at home. Pt reports that starting Friday, he has been feeling weak and ill. His wife reports that he has been having subjective fevers, which have been getting worse. Pt took his temp on morning of admission which was found to be 103F. Pt's wife gave him 2 tylenol and brought him to the ED. Pt reports that he had a paracentesis performed on 9/20 for therapeutic purposes. Pt reports that his weight has not increased form the time of the therapeutic tap, however, he does feel more bloated. Pt describes that he has been having a chronic cough for over a year, which he reports is 2/2 postnasal drip, for which he tales Claritin. Pt denies night sweats, hemoptysis, n/v/d/c, abd pain, chest pain, pain on urination.     In the ED:   Vitals: 99.5, 115->73, 94/60, rr24 97% on 3L  Notable Labs and Imaging: CXR bibasilar platelike atelectasis; Lactate 6.6->4.3->2.9;   Given: 4.7L NS, Cefepime, Vanc (24 Sep 2018 17:27)    PERTINENT PM/SXH:   Malignant neoplasm of colon, unspecified part of colon  HTN (hypertension)    History of tonsillectomy    FAMILY HISTORY:  Family history of breast cancer in mother (Mother)    ITEMS NOT CHECKED ARE NOT PRESENT    SOCIAL HISTORY:   Significant other/partner:  [ ]  Children:  [ ]  Pentecostal/Spirituality:  Substance hx:  [ ]   Tobacco hx:  [ ]   Alcohol hx: [ ]   Home Opioid hx:  [ ] I-Stop Reference No:  Living Situation: [ ]Home  [ ]Long term care  [ ]Rehab [ ]Other    ADVANCE DIRECTIVES:    DNR  Yes  MOLST  [ ]  Living Will  [ ]   DECISION MAKER(s):  [ ] Health Care Proxy(s)  [ ] Surrogate(s)  [ ] Guardian           Name(s): Phone Number(s):    BASELINE (I)ADL(s) (prior to admission):  [place]: [ ]Total  [ ] Moderate [ ]Dependent    Allergies    sulfa drugs (Unknown)    Intolerances    MEDICATIONS  (STANDING):  norepinephrine Infusion 0.05 MICROgram(s)/kG/Min (4.383 mL/Hr) IV Continuous <Continuous>  phenylephrine    Infusion 1 MICROgram(s)/kG/Min (17.531 mL/Hr) IV Continuous <Continuous>  phenylephrine    Infusion 2 MICROgram(s)/kG/Min (70.125 mL/Hr) IV Continuous <Continuous>    MEDICATIONS  (PRN):    PRESENT SYMPTOMS: [ ]Unable to obtain due to poor mentation   Source if other than patient:  [ ]Family   [ ]Team     Pain (Impact on QOL):    Location -         Minimal acceptable level (0-10 scale):                    Aggrevating factors -  Quality -  Radiation -  Severity (0-10 scale) -    Timing -    PAIN AD Score:     [email or web address] (press ctrl +  left click to view)    Dyspnea:                           [ ]Mild [ ]Moderate [ ]Severe  Anxiety:                             [ ]Mild [ ]Moderate [ ]Severe  Fatigue:                             [ ]Mild [ ]Moderate [ ]Severe  Nausea:                             [ ]Mild [ ]Moderate [ ]Severe  Loss of appetite:              [ ]Mild [ ]Moderate [ ]Severe  Constipation:                    [ ]Mild [ ]Moderate [ ]Severe    Other Symptoms:  [ ]All other review of systems negative     Karnofsky Performance Score/Palliative Performance Status Version 2:         %  PHYSICAL EXAM:  Vital Signs Last 24 Hrs  T(C): 36.2 (03 Oct 2018 12:00), Max: 36.5 (02 Oct 2018 16:00)  T(F): 97.1 (03 Oct 2018 12:00), Max: 97.7 (02 Oct 2018 16:00)  HR: 106 (03 Oct 2018 12:45) (89 - 112)  BP: 95/50 (03 Oct 2018 12:45) (63/37 - 138/94)  BP(mean): 69 (03 Oct 2018 12:45) (43 - 111)  RR: 24 (03 Oct 2018 12:45) (17 - 33)  SpO2: 92% (03 Oct 2018 12:45) (89% - 100%) I&O's Summary    02 Oct 2018 07:01  -  03 Oct 2018 07:00  --------------------------------------------------------  IN: 2850.3 mL / OUT: 3912 mL / NET: -1061.7 mL    03 Oct 2018 07:01  -  03 Oct 2018 12:59  --------------------------------------------------------  IN: 1524 mL / OUT: 0 mL / NET: 1524 mL    GENERAL:  [ ]Alert  [ ]Oriented x   [ ]Lethargic  [ ]Cachexia  [ ]Unarousable  [ ]Verbal  [ ]Non-Verbal  Behavioral:   [ ] Anxiety  [ ] Delirium [ ] Agitation [ ] Other  HEENT:  [ ]Normal   [ ]Dry mouth   [ ]ET Tube/Trach  [ ]Oral lesions  PULMONARY:   [ ]Clear [ ]Tachypnea  [ ]Audible excessive secretions   [ ]Rhonchi        [ ]Right [ ]Left [ ]Bilateral  [ ]Crackles        [ ]Right [ ]Left [ ]Bilateral  [ ]Wheezing     [ ]Right [ ]Left [ ]Bilateral  CARDIOVASCULAR:    [ ]Regular [ ]Irregular [ ]Tachy  [ ]Julius [ ]Murmur [ ]Other  GASTROINTESTINAL:  [ ]Soft  [ ]Distended   [ ]+BS  [ ]Non tender [ ]Tender  [ ]PEG [ ]OGT/ NGT  Last BM:   GENITOURINARY:  [ ]Normal [ ] Incontinent   [ ]Oliguria/Anuria   [ ]Crawford  MUSCULOSKELETAL:   [ ]Normal   [ ]Weakness  [ ]Bed/Wheelchair bound [ ]Edema  NEUROLOGIC:   [ ]No focal deficits  [ ] Cognitive impairment  [ ] Dysphagia [ ]Dysarthria [ ] Paresis [ ]Other   SKIN:   [ ]Normal   [ ]Pressure ulcer(s)  [ ]Rash    CRITICAL CARE:  [ ] Shock Present  [ ]Septic [ ]Cardiogenic [ ]Neurologic [ ]Hypovolemic  [ ]  Vasopressors [ ]  Inotropes   [ ] Respiratory failure present  [ ] Acute  [ ] Chronic [ ] Hypoxic  [ ] Hypercarbic [ ] Other  [ ] Other organ failure     LABS:                        9.4    36.1  )-----------( 96       ( 03 Oct 2018 09:02 )             30.5   10-03    139  |  105  |  53<H>  ----------------------------<  93  5.9<H>   |  8<LL>  |  2.74<H>    Ca    7.8<L>      03 Oct 2018 09:02  Phos  8.1     10-03  Mg     2.9     10-03    TPro  4.7<L>  /  Alb  3.0<L>  /  TBili  3.0<H>  /  DBili  x   /  AST  946<H>  /  ALT  190<H>  /  AlkPhos  380<H>  10-03  PT/INR - ( 03 Oct 2018 09:02 )   PT: 29.4 sec;   INR: 2.67 ratio         PTT - ( 03 Oct 2018 09:02 )  PTT:99.2 sec      RADIOLOGY & ADDITIONAL STUDIES:    PROTEIN CALORIE MALNUTRITION:   [ ] PPSV2 < or = to 30% [ ] significant weight loss  [ ] poor nutritional intake [ ] catabolic state [ ] anasarca     Albumin, Serum: 3.0 g/dL (10-03-18 @ 09:02)  Artificial Nutrition [ ]     REFERRALS:   [ ]Chaplaincy  [ ] Hospice  [ ]Child Life  [ ]Social Work  [ ]Case management [ ]Holistic Therapy   Goals of Care Discussion Document: HPI:  68M pmh metastatic colo ca (currently on chemo, last was 2 weeks ago), HTN, chronic ascitics now presenting to the ED for weakness, worsening cough, fevers to 103 at home. Pt reports that starting Friday, he has been feeling weak and ill. His wife reports that he has been having subjective fevers, which have been getting worse. Pt took his temp on morning of admission which was found to be 103F. Pt's wife gave him 2 tylenol and brought him to the ED. Pt reports that he had a paracentesis performed on 9/20 for therapeutic purposes. Pt reports that his weight has not increased form the time of the therapeutic tap, however, he does feel more bloated. Pt describes that he has been having a chronic cough for over a year, which he reports is 2/2 postnasal drip, for which he tales Claritin. Pt denies night sweats, hemoptysis, n/v/d/c, abd pain, chest pain, pain on urination.     Hospital course was complicated by newly diagnosed right portal and hepatic vein thrombosis, admitted to the MICU for increased work of breathing and hypotension. Patient displayed renal failure and HD was planed however patients clinical status had worsened with hematemesis. Medical management transitioned to comfort. Palliative was called for ongoing GOC.       PERTINENT PM/SXH:   Malignant neoplasm of colon, unspecified part of colon  HTN (hypertension)    History of tonsillectomy    FAMILY HISTORY:  Family history of breast cancer in mother (Mother)    ITEMS NOT CHECKED ARE NOT PRESENT    SOCIAL HISTORY:   Significant other/partner:  [ ]  Children:  [x ]  Anglican/Spirituality: Moravian   Substance hx:  [ ]   Tobacco hx:  [ ]   Alcohol hx: [ ]   Home Opioid hx:  [ ] I-Stop Reference No:  Living Situation: [x ]Home  [ ]Long term care  [ ]Rehab [ ]Other    ADVANCE DIRECTIVES:    DNR  Yes  MOLST  [ ]  Living Will  [ ]   DECISION MAKER(s):  [ ] Health Care Proxy(s)  [x ] Surrogate(s)  [ ] Guardian           Name(s): Phone Number(s): wife Holly Javed     BASELINE (I)ADL(s) (prior to admission):  [place]: [ ]Total  [ ] Moderate [ ]Dependent    Allergies  sulfa drugs (Unknown)  Intolerances    MEDICATIONS  (STANDING):  norepinephrine Infusion 0.05 MICROgram(s)/kG/Min (4.383 mL/Hr) IV Continuous <Continuous>  phenylephrine    Infusion 1 MICROgram(s)/kG/Min (17.531 mL/Hr) IV Continuous <Continuous>  phenylephrine    Infusion 2 MICROgram(s)/kG/Min (70.125 mL/Hr) IV Continuous <Continuous>    MEDICATIONS  (PRN):    PRESENT SYMPTOMS: [x ]Unable to obtain due to poor mentation   Source if other than patient:  [ ]Family   [ ]Team     Pain (Impact on QOL):    Location -         Minimal acceptable level (0-10 scale):                    Aggrevating factors -  Quality -  Radiation -  Severity (0-10 scale) -    Timing -    PAIN AD Score:     Dyspnea:                           [x ]Mild [ ]Moderate [ ]Severe  Anxiety:                             [ ]Mild [ ]Moderate [ ]Severe  Fatigue:                             [ ]Mild [ ]Moderate [ ]Severe  Nausea:                             [ ]Mild [ ]Moderate [ ]Severe  Loss of appetite:              [ ]Mild [ ]Moderate [ ]Severe  Constipation:                    [ ]Mild [ ]Moderate [ ]Severe    Other Symptoms:  [ ]All other review of systems negative     Karnofsky Performance Score/Palliative Performance Status Version 2:      30   %  PHYSICAL EXAM:  Vital Signs Last 24 Hrs  T(C): 36.2 (03 Oct 2018 12:00), Max: 36.5 (02 Oct 2018 16:00)  T(F): 97.1 (03 Oct 2018 12:00), Max: 97.7 (02 Oct 2018 16:00)  HR: 106 (03 Oct 2018 12:45) (89 - 112)  BP: 95/50 (03 Oct 2018 12:45) (63/37 - 138/94)  BP(mean): 69 (03 Oct 2018 12:45) (43 - 111)  RR: 24 (03 Oct 2018 12:45) (17 - 33)  SpO2: 92% (03 Oct 2018 12:45) (89% - 100%) I&O's Summary    02 Oct 2018 07:01  -  03 Oct 2018 07:00  --------------------------------------------------------  IN: 2850.3 mL / OUT: 3912 mL / NET: -1061.7 mL    03 Oct 2018 07:01  -  03 Oct 2018 12:59  --------------------------------------------------------  IN: 1524 mL / OUT: 0 mL / NET: 1524 mL    GENERAL:  [ ]Alert  [ ]Oriented x   [x ]Lethargic  [ ]Cachexia  [ ]Unarousable  [ ]Verbal  [ ]Non-Verbal  Behavioral:   [ ] Anxiety  [ ] Delirium [ ] Agitation [ ] Other  HEENT:  [ ]Normal   [x ]Dry mouth   [ ]ET Tube/Trach  [ ]Oral lesions  PULMONARY:   [x ]Clear [x ]Tachypnea  [ ]Audible excessive secretions   [ ]Rhonchi        [ ]Right [ ]Left [ ]Bilateral  [ ]Crackles        [ ]Right [ ]Left [ ]Bilateral  [ ]Wheezing     [ ]Right [ ]Left [ ]Bilateral  CARDIOVASCULAR:    [x ]Regular [ ]Irregular [ ]Tachy  [ ]Juilus [ ]Murmur [ ]Other  GASTROINTESTINAL:  [ ]Soft  [ ]Distended   [x ]+BS  [ ]Non tender [ ]Tender  [ ]PEG [ ]OGT/ NGT  Last BM:   GENITOURINARY:  [ ]Normal [ ] Incontinent   [x ]Oliguria/Anuria   [ ]Crawford  MUSCULOSKELETAL:   [ ]Normal   [ ]Weakness  [x ]Bed/Wheelchair bound [ x]Edema  NEUROLOGIC:   [ ]No focal deficits  [x ] Cognitive impairment  [ ] Dysphagia [ ]Dysarthria [ ] Paresis [ ]Other   SKIN:   [ ]Normal   [ ]Pressure ulcer(s)  [ ]Rash    CRITICAL CARE:  [ ] Shock Present  [ ]Septic [ ]Cardiogenic [ ]Neurologic [ ]Hypovolemic  [ ]  Vasopressors [ ]  Inotropes   [ ] Respiratory failure present  [ ] Acute  [ ] Chronic [ ] Hypoxic  [ ] Hypercarbic [ ] Other  [ ] Other organ failure     LABS:                        9.4    36.1  )-----------( 96       ( 03 Oct 2018 09:02 )             30.5   10-03    139  |  105  |  53<H>  ----------------------------<  93  5.9<H>   |  8<LL>  |  2.74<H>    Ca    7.8<L>      03 Oct 2018 09:02  Phos  8.1     10-03  Mg     2.9     10-03    TPro  4.7<L>  /  Alb  3.0<L>  /  TBili  3.0<H>  /  DBili  x   /  AST  946<H>  /  ALT  190<H>  /  AlkPhos  380<H>  10-03  PT/INR - ( 03 Oct 2018 09:02 )   PT: 29.4 sec;   INR: 2.67 ratio         PTT - ( 03 Oct 2018 09:02 )  PTT:99.2 sec      RADIOLOGY & ADDITIONAL STUDIES:  EXAM:  XR CHEST PORTABLE URGENT 1V                          IMPRESSION:   Low lung volumes. Clear lungs.      PROTEIN CALORIE MALNUTRITION:   [x ] PPSV2 < or = to 30% [ ] significant weight loss  [ ] poor nutritional intake [ ] catabolic state [ ] anasarca     Albumin, Serum: 3.0 g/dL (10-03-18 @ 09:02)  Artificial Nutrition [ ]     REFERRALS:   [x ]Chaplaincy  [ ] Hospice  [ ]Child Life  [ ]Social Work  [ ]Case management [ ]Holistic Therapy   Goals of Care Discussion Document: HPI:  68M pmh metastatic colo ca (currently on chemo, last was 2 weeks ago), HTN, chronic ascitics now presenting to the ED for weakness, worsening cough, fevers to 103 at home. Pt reports that starting Friday, he has been feeling weak and ill. His wife reports that he has been having subjective fevers, which have been getting worse. Pt took his temp on morning of admission which was found to be 103F. Pt's wife gave him 2 tylenol and brought him to the ED. Pt reports that he had a paracentesis performed on 9/20 for therapeutic purposes. Pt reports that his weight has not increased form the time of the therapeutic tap, however, he does feel more bloated. Pt describes that he has been having a chronic cough for over a year, which he reports is 2/2 postnasal drip, for which he tales Claritin. Pt denies night sweats, hemoptysis, n/v/d/c, abd pain, chest pain, pain on urination.     Hospital course was complicated by newly diagnosed right portal and hepatic vein thrombosis, admitted to the MICU for increased work of breathing and hypotension. Patient displayed renal failure and HD was planed however patients clinical status had worsened with hematemesis. Medical management transitioned to comfort. Palliative was called for ongoing GOC.       PERTINENT PM/SXH:   Malignant neoplasm of colon, unspecified part of colon  HTN (hypertension)    History of tonsillectomy    FAMILY HISTORY:  Family history of breast cancer in mother (Mother)    ITEMS NOT CHECKED ARE NOT PRESENT    SOCIAL HISTORY:   Significant other/partner:  [ ]  Children:  [x ]  Nondenominational/Spirituality: Religious   Substance hx:  [ ]   Tobacco hx:  [ ]   Alcohol hx: [ ]   Home Opioid hx:  [ ] I-Stop Reference No:  Living Situation: [x ]Home  [ ]Long term care  [ ]Rehab [ ]Other    ADVANCE DIRECTIVES:    DNR  Yes  MOLST  [ ]  Living Will  [ ]   DECISION MAKER(s):  [ ] Health Care Proxy(s)  [x ] Surrogate(s)  [ ] Guardian           Name(s): Phone Number(s): wife Holly Javed     BASELINE (I)ADL(s) (prior to admission):  [place]: [ ]Total  [ ] Moderate [ ]Dependent    Allergies  sulfa drugs (Unknown)  Intolerances    MEDICATIONS  (STANDING):  norepinephrine Infusion 0.05 MICROgram(s)/kG/Min (4.383 mL/Hr) IV Continuous <Continuous>  phenylephrine    Infusion 1 MICROgram(s)/kG/Min (17.531 mL/Hr) IV Continuous <Continuous>  phenylephrine    Infusion 2 MICROgram(s)/kG/Min (70.125 mL/Hr) IV Continuous <Continuous>    MEDICATIONS  (PRN):    PRESENT SYMPTOMS: [x ]Unable to obtain due to poor mentation   Source if other than patient:  [ ]Family   [ ]Team     Pain (Impact on QOL):    Location -         Minimal acceptable level (0-10 scale):                    Aggrevating factors -  Quality -  Radiation -  Severity (0-10 scale) -    Timing -    PAIN AD Score:     Dyspnea:                           [x ]Mild [ ]Moderate [ ]Severe  Anxiety:                             [ ]Mild [ ]Moderate [ ]Severe  Fatigue:                             [ ]Mild [ ]Moderate [ ]Severe  Nausea:                             [ ]Mild [ ]Moderate [ ]Severe  Loss of appetite:              [ ]Mild [ ]Moderate [ ]Severe  Constipation:                    [ ]Mild [ ]Moderate [ ]Severe    Other Symptoms:  [ ]All other review of systems negative     Karnofsky Performance Score/Palliative Performance Status Version 2:      30   %  PHYSICAL EXAM:  Vital Signs Last 24 Hrs  T(C): 36.2 (03 Oct 2018 12:00), Max: 36.5 (02 Oct 2018 16:00)  T(F): 97.1 (03 Oct 2018 12:00), Max: 97.7 (02 Oct 2018 16:00)  HR: 106 (03 Oct 2018 12:45) (89 - 112)  BP: 95/50 (03 Oct 2018 12:45) (63/37 - 138/94)  BP(mean): 69 (03 Oct 2018 12:45) (43 - 111)  RR: 24 (03 Oct 2018 12:45) (17 - 33)  SpO2: 92% (03 Oct 2018 12:45) (89% - 100%) I&O's Summary    02 Oct 2018 07:01  -  03 Oct 2018 07:00  --------------------------------------------------------  IN: 2850.3 mL / OUT: 3912 mL / NET: -1061.7 mL    03 Oct 2018 07:01  -  03 Oct 2018 12:59  --------------------------------------------------------  IN: 1524 mL / OUT: 0 mL / NET: 1524 mL    GENERAL:  [ ]Alert  [ ]Oriented x   [x ]Lethargic  [ ]Cachexia  [ ]Unarousable  [ ]Verbal  [ ]Non-Verbal  Behavioral:   [ ] Anxiety  [ ] Delirium [ ] Agitation [ ] Other  HEENT:  [ ]Normal   [x ]Dry mouth   [ ]ET Tube/Trach  [ ]Oral lesions  PULMONARY:   [x ]Clear [x ]Tachypnea  [ ]Audible excessive secretions   [ ]Rhonchi        [ ]Right [ ]Left [ ]Bilateral  [ ]Crackles        [ ]Right [ ]Left [ ]Bilateral  [ ]Wheezing     [ ]Right [ ]Left [ ]Bilateral  CARDIOVASCULAR:    [x ]Regular [ ]Irregular [ ]Tachy  [ ]Julius [ ]Murmur [ ]Other  GASTROINTESTINAL:  [ ]Soft  [ ]Distended   [x ]+BS  [ ]Non tender [ ]Tender  [ ]PEG [ ]OGT/ NGT  Last BM:   GENITOURINARY:  [ ]Normal [ ] Incontinent   [x ]Oliguria/Anuria   [ ]Crawford  MUSCULOSKELETAL:   [ ]Normal   [ ]Weakness  [x ]Bed/Wheelchair bound [ x]Edema  NEUROLOGIC:   [ ]No focal deficits  [x ] Cognitive impairment  [ ] Dysphagia [ ]Dysarthria [ ] Paresis [ ]Other   SKIN:   [ ]Normal   [ ]Pressure ulcer(s)  [ ]Rash    CRITICAL CARE:  [x ] Shock Present  [x ]Septic [ ]Cardiogenic [ ]Neurologic [ ]Hypovolemic  [x ]  Vasopressors [ ]  Inotropes   [x ] Respiratory failure present  [x ] Acute  [ ] Chronic [x ] Hypoxic  [ ] Hypercarbic [ ] Other  [ ] Other organ failure     LABS:                        9.4    36.1  )-----------( 96       ( 03 Oct 2018 09:02 )             30.5   10-03    139  |  105  |  53<H>  ----------------------------<  93  5.9<H>   |  8<LL>  |  2.74<H>    Ca    7.8<L>      03 Oct 2018 09:02  Phos  8.1     10-03  Mg     2.9     10-03    TPro  4.7<L>  /  Alb  3.0<L>  /  TBili  3.0<H>  /  DBili  x   /  AST  946<H>  /  ALT  190<H>  /  AlkPhos  380<H>  10-03  PT/INR - ( 03 Oct 2018 09:02 )   PT: 29.4 sec;   INR: 2.67 ratio         PTT - ( 03 Oct 2018 09:02 )  PTT:99.2 sec      RADIOLOGY & ADDITIONAL STUDIES:  EXAM:  XR CHEST PORTABLE URGENT 1V                          IMPRESSION:   Low lung volumes. Clear lungs.      PROTEIN CALORIE MALNUTRITION:   [x ] PPSV2 < or = to 30% [ ] significant weight loss  [ ] poor nutritional intake [ ] catabolic state [ ] anasarca     Albumin, Serum: 3.0 g/dL (10-03-18 @ 09:02)  Artificial Nutrition [ ]     REFERRALS:   [x ]Chaplaincy  [ ] Hospice  [ ]Child Life  [ ]Social Work  [ ]Case management [ ]Holistic Therapy   Goals of Care Discussion Document:

## 2018-10-03 NOTE — PROGRESS NOTE ADULT - SUBJECTIVE AND OBJECTIVE BOX
*******************************  Aleksandr Nabeel, PGY-2  Pager [phone]  *******************************    INTERVAL HPI/OVERNIGHT EVENTS:    SUBJECTIVE: Patient seen and examined at bedside.     CONSTITUTIONAL: No weakness, fevers or chills  EYES/ENT: No visual changes;  No vertigo or throat pain   NECK: No pain or stiffness  RESPIRATORY: No cough, wheezing, hemoptysis; No shortness of breath  CARDIOVASCULAR: No chest pain or palpitations  GASTROINTESTINAL: No abdominal or epigastric pain. No nausea, vomiting, or hematemesis; No diarrhea or constipation. No melena or hematochezia.  GENITOURINARY: No dysuria, frequency or hematuria  NEUROLOGICAL: No numbness or weakness  SKIN: No itching, rashes    OBJECTIVE:    VITAL SIGNS:  ICU Vital Signs Last 24 Hrs  T(C): 36.1 (03 Oct 2018 07:45), Max: 36.5 (02 Oct 2018 16:00)  T(F): 97 (03 Oct 2018 07:45), Max: 97.7 (02 Oct 2018 16:00)  HR: 110 (03 Oct 2018 10:30) (89 - 111)  BP: 96/48 (03 Oct 2018 10:30) (63/37 - 138/94)  BP(mean): 69 (03 Oct 2018 10:30) (43 - 111)  ABP: --  ABP(mean): --  RR: 29 (03 Oct 2018 10:30) (17 - 33)  SpO2: 90% (03 Oct 2018 10:30) (89% - 100%)        10-02 @ 07:01  -  10-03 @ 07:00  --------------------------------------------------------  IN: 2850.3 mL / OUT: 3912 mL / NET: -1061.7 mL    10-03 @ 07:01  -  10-03 @ 11:08  --------------------------------------------------------  IN: 1080.4 mL / OUT: 0 mL / NET: 1080.4 mL      CAPILLARY BLOOD GLUCOSE      POCT Blood Glucose.: 169 mg/dL (03 Oct 2018 04:51)      PHYSICAL EXAM:    General: NAD  HEENT: NC/AT; PERRL, clear conjunctiva  Neck: supple  Respiratory: CTA b/l  Cardiovascular: +S1/S2; RRR  Abdomen: soft, NT/ND; +BS x4  Extremities: WWP, 2+ peripheral pulses b/l; no LE edema  Skin: normal color and turgor; no rash  Neurological:    MEDICATIONS:  MEDICATIONS  (STANDING):  albumin human 25% IVPB 100 milliLiter(s) IV Intermittent every 6 hours  hydrocortisone sodium succinate Injectable 100 milliGRAM(s) IV Push every 8 hours  meropenem  IVPB 1000 milliGRAM(s) IV Intermittent every 12 hours  meropenem  IVPB      norepinephrine Infusion 0.05 MICROgram(s)/kG/Min (4.383 mL/Hr) IV Continuous <Continuous>  pantoprazole Infusion 8 mG/Hr (10 mL/Hr) IV Continuous <Continuous>  phenylephrine    Infusion 1 MICROgram(s)/kG/Min (17.531 mL/Hr) IV Continuous <Continuous>  phenylephrine    Infusion 2 MICROgram(s)/kG/Min (70.125 mL/Hr) IV Continuous <Continuous>  sodium bicarbonate  Infusion 0.12 mEq/kG/Hr (75 mL/Hr) IV Continuous <Continuous>    MEDICATIONS  (PRN):      ALLERGIES:  Allergies    sulfa drugs (Unknown)    Intolerances        LABS:                        9.4    36.1  )-----------( 96       ( 03 Oct 2018 09:02 )             30.5     10-03    139  |  105  |  53<H>  ----------------------------<  93  5.9<H>   |  8<LL>  |  2.74<H>    Ca    7.8<L>      03 Oct 2018 09:02  Phos  8.1     10-03  Mg     2.9     10-03    TPro  4.7<L>  /  Alb  3.0<L>  /  TBili  3.0<H>  /  DBili  x   /  AST  946<H>  /  ALT  190<H>  /  AlkPhos  380<H>  10-03    PT/INR - ( 03 Oct 2018 09:02 )   PT: 29.4 sec;   INR: 2.67 ratio         PTT - ( 03 Oct 2018 09:02 )  PTT:99.2 sec      RADIOLOGY & ADDITIONAL TESTS: Reviewed.

## 2018-10-03 NOTE — DISCHARGE NOTE FOR THE EXPIRED PATIENT - HOSPITAL COURSE
68M PMH metastatic colo ca (currently on chemo, last was 2 weeks ago), HTN, chronic ascitics now presenting to the ED for weakness, worsening cough, fevers to 103 at home. Pt reports that starting Friday, he has been feeling weak and ill. His wife reports that he has been having subjective fevers, which have been getting worse. Pt took his temp on morning of admission which was found to be 103F. Pt's wife gave him 2 tylenol and brought him to the ED. Pt reports that he had a paracentesis performed on  for therapeutic purposes. Pt reports that his weight has not increased form the time of the therapeutic tap, however, he does feel more bloated. Pt describes that he has been having a chronic cough for over a year, which he reports is 2/2 postnasal drip, for which he tales Claritin. Pt denies night sweats, hemoptysis, n/v/d/c, abd pain, chest pain, pain on urination.     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 Throughout his hospitalization, patient has been experiencing continuously progressive abdominal distention, and worsening renal function. Earlier today, patient began to experienece significant dyspnea, and increased work of breathing. Patient was given 40mg IV Lasix as patient is clinically fluid overloaded. CXR revealed low lung volumes, but no pulmonary edema. Patient was started on Albumin for concern for hepatorenal syndrome. MICU originally consulted on 10/1 consulted today for dyspnea. Patient was not a MICU candidate at this time. This AM, MICU reconsulted for hypotension to the 60s/40s. Patient started on Сергей in the room for hypotension, and transferred to the MICU for further care. Family GOC decided on comfort care, DNR/DNI.  He  on 10/3/18 at 20:10, family at the bedside.

## 2018-10-03 NOTE — CONSULT NOTE ADULT - PROBLEM SELECTOR PROBLEM 1
Acute kidney injury
Malignant neoplasm of colon, unspecified part of colon
Malignant neoplasm of colon, unspecified part of colon

## 2018-10-03 NOTE — PROGRESS NOTE ADULT - SUBJECTIVE AND OBJECTIVE BOX
[facility] DIVISION OF KIDNEY DISEASES AND HYPERTENSION -- FOLLOW UP NOTE  --------------------------------------------------------------------------------  HPI: 68 M with medical history of metastatic colo ca (currently on chemo, last was 2 weeks ago), HTN, chronic ascitics admitted for fever. Nephrology consulted for ROSEMARY. Initially came complaining of  weakness, worsening cough, fevers that started 3 days before admission. 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 Throughout his hospitalization, patient has been experiencing continuously progressive abdominal distention, and worsening renal function. Pt developed worsening dyspnea became hypotensive and was started on Albumin for concern for hepatorenal syndrome 10/2/18.   On lab review at [facility]/[place] SCr WNL 0.9 on 4/14/18, 1 on 9/9/18. At admission SCr elevated to 1.8 on 9/24/18.    Pt was seen and examined at MICU, tachypneic, overnight pressor requirement increase, SOB, feels weak.     PAST HISTORY  --------------------------------------------------------------------------------  No significant changes to PMH, PSH, FHx, SHx, unless otherwise noted    ALLERGIES & MEDICATIONS  --------------------------------------------------------------------------------  Allergies    sulfa drugs (Unknown)    Intolerances      Standing Inpatient Medications  albumin human 25% IVPB 100 milliLiter(s) IV Intermittent every 6 hours  hydrocortisone sodium succinate Injectable 100 milliGRAM(s) IV Push every 8 hours  meropenem  IVPB 1000 milliGRAM(s) IV Intermittent every 12 hours  meropenem  IVPB      norepinephrine Infusion 0.05 MICROgram(s)/kG/Min IV Continuous <Continuous>  pantoprazole Infusion 8 mG/Hr IV Continuous <Continuous>  phenylephrine    Infusion 1 MICROgram(s)/kG/Min IV Continuous <Continuous>  phenylephrine    Infusion 2 MICROgram(s)/kG/Min IV Continuous <Continuous>  sodium bicarbonate  Infusion 0.12 mEq/kG/Hr IV Continuous <Continuous>  vancomycin  IVPB 1000 milliGRAM(s) IV Intermittent once    PRN Inpatient Medications      REVIEW OF SYSTEMS  --------------------------------------------------------------------------------  Gen: No fevers  Skin: No rashes  Respiratory: + dyspnea, cough  CV: No chest pain  GI: +abdominal pain, No diarrhea, constipation, nausea, vomiting  : No dysuria, hematuria  MSK:+  edema  Heme: No easy bruising or bleeding  Psych: No significant depression    All other systems were reviewed and are negative, except as noted.    VITALS/PHYSICAL EXAM  --------------------------------------------------------------------------------  T(C): 36.1 (10-03-18 @ 07:45), Max: 36.5 (10-02-18 @ 16:00)  HR: 108 (10-03-18 @ 07:45) (85 - 111)  BP: 92/48 (10-03-18 @ 07:45) (63/37 - 138/94)  RR: 27 (10-03-18 @ 07:45) (17 - 33)  SpO2: 92% (10-03-18 @ 07:45) (89% - 100%)  Wt(kg): --        10-02-18 @ 07:01  -  10-03-18 @ 07:00  --------------------------------------------------------  IN: 2530.3 mL / OUT: 3912 mL / NET: -1381.7 mL    10-03-18 @ 07:01  -  10-03-18 @ 08:49  --------------------------------------------------------  IN: 136.8 mL / OUT: 0 mL / NET: 136.8 mL      Physical Exam:  	Gen: Tachypneic  	HEENT: Dry mucous membranes. On non rebreather  	Pulm: decrease breath sounds B/L  	CV: S1S2  	Abd: Soft, +BS   	Ext: 3+ pitting LE edema B/L  	Neuro: Awake  	Skin: Dry              Vascular access: RIJ non tunneled catheter. no blood noted    LABS/STUDIES  --------------------------------------------------------------------------------              8.1    41.5  >-----------<  104      [10-03-18 @ 04:42]              26.5     141  |  105  |  50  ----------------------------<  61      [10-03-18 @ 04:42]  5.4   |  10  |  2.46        Ca     8.3     [10-03-18 @ 04:42]      Mg     3.0     [10-03-18 @ 04:42]      Phos  5.8     [10-03-18 @ 04:42]    TPro  5.1  /  Alb  3.1  /  TBili  2.9  /  DBili  x   /  AST  877  /  ALT  172  /  AlkPhos  378  [10-03-18 @ 04:42]    PT/INR: PT 26.9 , INR 2.42       [10-03-18 @ 04:42]  PTT: 133.8      [10-03-18 @ 04:42]    Creatinine Trend:  SCr 2.46 [10-03 @ 04:42]  SCr 2.61 [10-03 @ 00:14]  SCr 2.92 [10-02 @ 16:44]  SCr 2.71 [10-02 @ 10:42]  SCr 2.58 [10-02 @ 08:07]    Urinalysis - [09-25-18 @ 08:40]      Color Yellow / Appearance Clear / SG 1.022 / pH 5.5      Gluc Negative / Ketone Negative  / Bili Negative / Urobili Negative       Blood Negative / Protein Trace / Leuk Est Negative / Nitrite Negative      RBC 9 / WBC 1 / Hyaline 12 / Gran 1 / Sq Epi  / Non Sq Epi 1 / Bacteria Negative      HbA1c 5.5      [09-25-18 @ 09:27]  Lipid: chol 69, TG 74, HDL 20, LDL 34      [09-25-18 @ 09:38]

## 2018-10-03 NOTE — PROGRESS NOTE ADULT - ATTENDING COMMENTS
Seen on am rounds.  Not responsive, on facemask  1.  ARF--not tolerating CVVHDF.  Unlikely to significantly alter course.  Can restart if condition improves  2.  Acidosis--lactic, hypoperfuse.  Volume, hemodynamic optimization, HCO3 supplement for pH<7

## 2018-10-03 NOTE — CONSULT NOTE ADULT - PROBLEM SELECTOR RECOMMENDATION 9
Pt with ROSEMARY in the setting of hepatorenal physiology, hypotension. On lab review at [facility] [facility] WNL 0.9 on 4/14/18, 1 on 9/9/18. At admission SCr elevated to 1.8 on 9/24/18 improved to 1.1 on 9/29/18 and up trended to 2.71 today 10/2/18. Check UA, urine electrolytes, urine creatinine, urine urea. Will start CVVHDF due to fluid overload. Monitor UOP and daily weights. Avoid volume depletion, NSAIDs, ARB/ACE-I. Dose medications as per eGRF.
- patient with metastatic colon CA currently on chemotherapy with sepsis secondary to likely abdominal infection given fever and tachycardia   - agree with paracentesis to rule out possible SBP, RVP negative and chest xray without obvious infiltrate  - if patient continues to spike fevers, would consider CT chest if pending workup is negative   - continue broad spectrum antibiotics and await results from blood and urine cultures  - continue aggressive fluid resuscitation   - chemo on hold for now, patient will follow up with outpatient oncologist, Dr. Ibarra after discharge   - will continue to follow
No further DMT  ECOG score 4  Supportive management

## 2018-10-03 NOTE — GOALS OF CARE CONVERSATION - PERSONAL ADVANCE DIRECTIVE - CONVERSATION DETAILS
Family meeting held with patients wife and  of 40 years. Family had a good understanding that the malignancy had spread and the effects that it has on the body. They were able to verbalize and explain what comfort care meant to them. It meant managing symptoms of pain most importantly along with dyspnea. We introduced the pcu unit to the family and recommend transfer to the unit to better assist with symptom management. Family agreed for transfer when bed available.     DNR/DNI/ no feeding tube  capped pressors  no HD  no labs

## 2018-10-03 NOTE — PROGRESS NOTE ADULT - SUBJECTIVE AND OBJECTIVE BOX
SUBJECTIVE AND OBJECTIVE:  INTERVAL HPI/OVERNIGHT EVENTS:    DNR on chart: Yes    Allergies    sulfa drugs (Unknown)    Intolerances    MEDICATIONS  (STANDING):  norepinephrine Infusion 0.05 MICROgram(s)/kG/Min (4.383 mL/Hr) IV Continuous <Continuous>  phenylephrine    Infusion 1 MICROgram(s)/kG/Min (17.531 mL/Hr) IV Continuous <Continuous>  phenylephrine    Infusion 2 MICROgram(s)/kG/Min (70.125 mL/Hr) IV Continuous <Continuous>    MEDICATIONS  (PRN):  glycopyrrolate Injectable 0.4 milliGRAM(s) IV Push every 6 hours PRN secretions  HYDROmorphone  Injectable 0.5 milliGRAM(s) IV Push every 2 hours PRN dyspnea (goal RR <30)  HYDROmorphone  Injectable 0.5 milliGRAM(s) IV Push every 2 hours PRN pain  LORazepam   Injectable 0.5 milliGRAM(s) IV Push every 2 hours PRN Anxiety      ITEMS UNCHECKED ARE NOT PRESENT    PRESENT SYMPTOMS: [ ]Unable to obtain due to poor mentation   Source if other than patient:  [ ]Family   [ ]Team     Pain (Impact on QOL):    Location:  Minimal acceptable level (0-10 scale):                   Aggrevating factors:  Quality:  Radiation:  Severity (0-10 scale):    Timing:    Dyspnea:                           [ ]Mild [ ]Moderate [ ]Severe  Anxiety:                             [ ]Mild [ ]Moderate [ ]Severe  Fatigue:                             [ ]Mild [ ]Moderate [ ]Severe  Nausea:                             [ ]Mild [ ]Moderate [ ]Severe  Loss of appetite:              [ ]Mild [ ]Moderate [ ]Severe  Constipation:                    [ ]Mild [ ]Moderate [ ]Severe    PAIN AD Score:	  http://geriatrictoolkit.[facility]/cog/[email or web address] (Ctrl + left click to view)    Other Symptoms:  [ ]All other review of systems negative     Karnofsky Performance Score/Palliative Performance Status Version 2:         %  PHYSICAL EXAM:  Vital Signs Last 24 Hrs  T(C): 36.2 (03 Oct 2018 12:00), Max: 36.5 (02 Oct 2018 16:00)  T(F): 97.1 (03 Oct 2018 12:00), Max: 97.7 (02 Oct 2018 16:00)  HR: 106 (03 Oct 2018 12:45) (89 - 112)  BP: 95/50 (03 Oct 2018 12:45) (63/37 - 138/94)  BP(mean): 69 (03 Oct 2018 12:45) (43 - 111)  RR: 24 (03 Oct 2018 12:45) (17 - 33)  SpO2: 92% (03 Oct 2018 12:45) (89% - 100%) I&O's Summary    02 Oct 2018 07:01  -  03 Oct 2018 07:00  --------------------------------------------------------  IN: 2850.3 mL / OUT: 3912 mL / NET: -1061.7 mL    03 Oct 2018 07:01  -  03 Oct 2018 14:06  --------------------------------------------------------  IN: 1760.8 mL / OUT: 0 mL / NET: 1760.8 mL     GENERAL:  [ ]Alert  [ ]Oriented x   [ ]Lethargic  [ ]Cachexia  [ ]Unarousable  [ ]Verbal  [ ]Non-Verbal  Behavioral:   [ ] Anxiety  [ ] Delirium [ ] Agitation [ ] Other  HEENT:  [ ]Normal   [ ]Dry mouth   [ ]ET Tube/Trach  [ ]Oral lesions  PULMONARY:   [ ]Clear [ ]Tachypnea  [ ]Audible excessive secretions   [ ]Rhonchi        [ ]Right [ ]Left [ ]Bilateral  [ ]Crackles        [ ]Right [ ]Left [ ]Bilateral  [ ]Wheezing     [ ]Right [ ]Left [ ]Bilateral  CARDIOVASCULAR:    [ ]Regular [ ]Irregular [ ]Tachy  [ ]Julius [ ]Murmur [ ]Other  GASTROINTESTINAL:  [ ]Soft  [ ]Distended   [ ]+BS  [ ]Non tender [ ]Tender  [ ]PEG [ ]OGT/ NGT   Last BM:    GENITOURINARY:  [ ]Normal [ ] Incontinent   [ ]Oliguria/Anuria   [ ][place]  MUSCULOSKELETAL:   [ ]Normal   [ ]Weakness  [ ]Bed/Wheelchair bound [ ]Edema  NEUROLOGIC:   [ ]No focal deficits  [ ] Cognitive impairment  [ ] Dysphagia [ ]Dysarthria [ ] Paresis [ ]Other   SKIN:   [ ]Normal   [ ]Pressure ulcer(s)  [ ]Rash    CRITICAL CARE:  [ ] Shock Present  [ ]Septic [ ]Cardiogenic [ ]Neurologic [ ]Hypovolemic  [ ]  Vasopressors [ ]  Inotropes   [ ] Respiratory failure present  [ ] Acute  [ ] Chronic [ ] Hypoxic  [ ] Hypercarbic [ ] Other  [ ] Other organ failure     LABS:                        9.4    36.1  )-----------( 96       ( 03 Oct 2018 09:02 )             30.5   10-03    139  |  105  |  53<H>  ----------------------------<  93  5.9<H>   |  8<LL>  |  2.74<H>    Ca    7.8<L>      03 Oct 2018 09:02  Phos  8.1     10-03  Mg     2.9     10-03    TPro  4.7<L>  /  Alb  3.0<L>  /  TBili  3.0<H>  /  DBili  x   /  AST  946<H>  /  ALT  190<H>  /  AlkPhos  380<H>  10-03  PT/INR - ( 03 Oct 2018 09:02 )   PT: 29.4 sec;   INR: 2.67 ratio         PTT - ( 03 Oct 2018 09:02 )  PTT:99.2 sec      RADIOLOGY & ADDITIONAL STUDIES:    Protein Calorie Malnutrition:  [ ] PPSV2 < or = 30%  [ ] significant weight loss [ ] poor nutritional intake [ ] amasarca [ ] catabolic state Albumin, Serum: 3.0 g/dL (10-03-18 @ 09:02)  Artificial Nutrition [ ]     REFERRALS:   [ ]Chaplaincy  [ ] Hospice  [ ]Child Life  [ ]Social Work  [ ]Case management [ ]Holistic Therapy   Goals of Care Document:

## 2018-10-03 NOTE — PROGRESS NOTE ADULT - ASSESSMENT
Patient is a 67 y/o M with a PMH significant for metastatic colon cancer on chemotherapy who presents with sepsis found to have high grade listeria bacteremia Patient is a 69 y/o M with a PMH significant for metastatic colon cancer on chemotherapy who presents with sepsis found to have high grade listeria bacteremia, hospital course has been complicated by acute kidney injury requiring CVVHD, hepatorenal syndrome, upper GI bleed, lactic acidosis. Patient actively dying at this time and wife aware and has elected to avoid more aggressive measures at this time.

## 2018-10-03 NOTE — PROGRESS NOTE ADULT - SUBJECTIVE AND OBJECTIVE BOX
CC: Patient is a 68y old  Male who presents with a chief complaint of sepsis (03 Oct 2018 12:55)    ID following for listeria bacteremia, shock    Interval History/ROS: Patient in MICU, dyspneic, overnight developed coffee ground emesis, s/p NGT with 1.8 L of coffee ground fluid. Remains on pressors.    Allergies  sulfa drugs (Unknown)    ANTIMICROBIALS:      OTHER MEDS:  norepinephrine Infusion 0.05 MICROgram(s)/kG/Min IV Continuous <Continuous>  phenylephrine    Infusion 1 MICROgram(s)/kG/Min IV Continuous <Continuous>  phenylephrine    Infusion 2 MICROgram(s)/kG/Min IV Continuous <Continuous>      PE:    Vital Signs Last 24 Hrs  T(C): 36.2 (03 Oct 2018 12:00), Max: 36.5 (02 Oct 2018 16:00)  T(F): 97.1 (03 Oct 2018 12:00), Max: 97.7 (02 Oct 2018 16:00)  HR: 106 (03 Oct 2018 12:45) (89 - 112)  BP: 95/50 (03 Oct 2018 12:45) (63/37 - 138/94)  BP(mean): 69 (03 Oct 2018 12:45) (43 - 111)  RR: 24 (03 Oct 2018 12:45) (17 - 33)  SpO2: 92% (03 Oct 2018 12:45) (89% - 100%)    Gen: Awake, dyspnea  CV: S1+S2 normal, no murmurs  Resp: Coarse breath sounds bilaterally  Abd: Soft, distended, tender  Ext: LE edema  : Scrotal swelling  IV/Skin: No thrombophlebitis  Neuro: Answering questions    LABS:                          9.4    36.1  )-----------( 96       ( 03 Oct 2018 09:02 )             30.5       10-03    139  |  105  |  53<H>  ----------------------------<  93  5.9<H>   |  8<LL>  |  2.74<H>    Ca    7.8<L>      03 Oct 2018 09:02  Phos  8.1     10-03  Mg     2.9     10-03    TPro  4.7<L>  /  Alb  3.0<L>  /  TBili  3.0<H>  /  DBili  x   /  AST  946<H>  /  ALT  190<H>  /  AlkPhos  380<H>  10-03    MICROBIOLOGY:  Vancomycin Level, Random: 9.9 ug/mL (10-03-18 @ 00:14)  v  .Sputum Sputum  10-03-18 --  --    Numerous polymorphonuclear leukocytes per low power field  Few Squamous epithelial cells per low power field  Numerous Yeast like cells seen per oil power field      .Body Fluid Peritoneal Fluid  10-02-18   Testing in progress  --    polymorphonuclear leukocytes seen  No organisms seen  by cytocentrifuge      .Blood Blood-Peripheral  09-29-18   No growth to date.  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  See previous culture [id number]  --    Growth in aerobic bottle: Gram Positive Rods      .Blood Blood  09-27-18   Growth in aerobic and anaerobic bottles: Listeria monocytogenes  See previous culture [id number]  --    Growth in aerobic bottle: Gram Positive Rods  Growth in anaerobic bottle: Gram Positive Rods      Peritoneal Peritoneal Fluid  09-25-18   No growth at 5 days  --    polymorphonuclear leukocytes seen  No organisms seen by cytocentrifuge      .Blood Blood-Peripheral  09-24-18   Growth in aerobic and anaerobic bottles: Listeria monocytogenes  Resistance to ampicillin or penicillin for Listeria  monocytogenes has not been described. L. monocytogenes is intrinsically  resistant to cephalosporins.  --    Growth in anaerobic bottle: Gram Positive Rods  Growth in aerobic bottle: Gram Positive Rods      .Blood Port Device  09-24-18   Growth in aerobic and anaerobic bottles: Listeria monocytogenes  Resistance to ampicillin or penicillin for Listeria  monocytogenes has not been described. L. monocytogenes is intrinsically  resistant to cephalosporins.  --    Growth in anaerobic bottle: Gram Positive Rods  Growth in aerobic bottle: Gram Positive Rods      .Blood Blood  09-09-18   No growth at 5 days.  --  --    RADIOLOGY:    < from: Xray Chest 1 View- PORTABLE-Urgent (10.03.18 @ 05:56) >  IMPRESSION:   Low lung volumes. Clear lungs.    < end of copied text >

## 2018-10-03 NOTE — CONSULT NOTE ADULT - ASSESSMENT
Patient is a 68M pmh metastatic colon ca, right portal and hepatic vein thrombosis, renal failure admitted to MICU for hypotension and respiratory distress. Palliative consulted for GOC.

## 2018-10-03 NOTE — PROGRESS NOTE ADULT - PROBLEM SELECTOR PLAN 3
- patient actively dying at this time with worsening renal function, lactic acidosis, increasing pressor requirements  - emotional support offered

## 2018-10-04 LAB
COMMENT - FLUIDS: SIGNIFICANT CHANGE UP
CULTURE RESULTS: SIGNIFICANT CHANGE UP
GENTAMICIN SERPL-MCNC: 2.4 UG/ML — SIGNIFICANT CHANGE UP
SPECIMEN SOURCE: SIGNIFICANT CHANGE UP

## 2018-10-04 PROCEDURE — 71250 CT THORAX DX C-: CPT

## 2018-10-04 PROCEDURE — C1729: CPT

## 2018-10-04 PROCEDURE — 85027 COMPLETE CBC AUTOMATED: CPT

## 2018-10-04 PROCEDURE — 99285 EMERGENCY DEPT VISIT HI MDM: CPT | Mod: 25

## 2018-10-04 PROCEDURE — 87075 CULTR BACTERIA EXCEPT BLOOD: CPT

## 2018-10-04 PROCEDURE — 87102 FUNGUS ISOLATION CULTURE: CPT

## 2018-10-04 PROCEDURE — 88342 IMHCHEM/IMCYTCHM 1ST ANTB: CPT

## 2018-10-04 PROCEDURE — 86901 BLOOD TYPING SEROLOGIC RH(D): CPT

## 2018-10-04 PROCEDURE — 97116 GAIT TRAINING THERAPY: CPT

## 2018-10-04 PROCEDURE — 82962 GLUCOSE BLOOD TEST: CPT

## 2018-10-04 PROCEDURE — 87040 BLOOD CULTURE FOR BACTERIA: CPT

## 2018-10-04 PROCEDURE — 96374 THER/PROPH/DIAG INJ IV PUSH: CPT

## 2018-10-04 PROCEDURE — 84132 ASSAY OF SERUM POTASSIUM: CPT

## 2018-10-04 PROCEDURE — 71045 X-RAY EXAM CHEST 1 VIEW: CPT

## 2018-10-04 PROCEDURE — 87015 SPECIMEN INFECT AGNT CONCNTJ: CPT

## 2018-10-04 PROCEDURE — 87633 RESP VIRUS 12-25 TARGETS: CPT

## 2018-10-04 PROCEDURE — 97110 THERAPEUTIC EXERCISES: CPT

## 2018-10-04 PROCEDURE — 96375 TX/PRO/DX INJ NEW DRUG ADDON: CPT

## 2018-10-04 PROCEDURE — P9016: CPT

## 2018-10-04 PROCEDURE — 87116 MYCOBACTERIA CULTURE: CPT

## 2018-10-04 PROCEDURE — 80048 BASIC METABOLIC PNL TOTAL CA: CPT

## 2018-10-04 PROCEDURE — 86900 BLOOD TYPING SEROLOGIC ABO: CPT

## 2018-10-04 PROCEDURE — 36430 TRANSFUSION BLD/BLD COMPNT: CPT

## 2018-10-04 PROCEDURE — 83605 ASSAY OF LACTIC ACID: CPT

## 2018-10-04 PROCEDURE — 93975 VASCULAR STUDY: CPT

## 2018-10-04 PROCEDURE — 82947 ASSAY GLUCOSE BLOOD QUANT: CPT

## 2018-10-04 PROCEDURE — 86923 COMPATIBILITY TEST ELECTRIC: CPT

## 2018-10-04 PROCEDURE — 86850 RBC ANTIBODY SCREEN: CPT

## 2018-10-04 PROCEDURE — 88112 CYTOPATH CELL ENHANCE TECH: CPT

## 2018-10-04 PROCEDURE — 85384 FIBRINOGEN ACTIVITY: CPT

## 2018-10-04 PROCEDURE — 83036 HEMOGLOBIN GLYCOSYLATED A1C: CPT

## 2018-10-04 PROCEDURE — 87206 SMEAR FLUORESCENT/ACID STAI: CPT

## 2018-10-04 PROCEDURE — 82533 TOTAL CORTISOL: CPT

## 2018-10-04 PROCEDURE — 85014 HEMATOCRIT: CPT

## 2018-10-04 PROCEDURE — 74176 CT ABD & PELVIS W/O CONTRAST: CPT

## 2018-10-04 PROCEDURE — 81001 URINALYSIS AUTO W/SCOPE: CPT

## 2018-10-04 PROCEDURE — 80061 LIPID PANEL: CPT

## 2018-10-04 PROCEDURE — 82945 GLUCOSE OTHER FLUID: CPT

## 2018-10-04 PROCEDURE — P9059: CPT

## 2018-10-04 PROCEDURE — 82330 ASSAY OF CALCIUM: CPT

## 2018-10-04 PROCEDURE — 80053 COMPREHEN METABOLIC PANEL: CPT

## 2018-10-04 PROCEDURE — 84295 ASSAY OF SERUM SODIUM: CPT

## 2018-10-04 PROCEDURE — 87205 SMEAR GRAM STAIN: CPT

## 2018-10-04 PROCEDURE — 85610 PROTHROMBIN TIME: CPT

## 2018-10-04 PROCEDURE — 82803 BLOOD GASES ANY COMBINATION: CPT

## 2018-10-04 PROCEDURE — 83735 ASSAY OF MAGNESIUM: CPT

## 2018-10-04 PROCEDURE — 84100 ASSAY OF PHOSPHORUS: CPT

## 2018-10-04 PROCEDURE — 87486 CHLMYD PNEUM DNA AMP PROBE: CPT

## 2018-10-04 PROCEDURE — 88341 IMHCHEM/IMCYTCHM EA ADD ANTB: CPT

## 2018-10-04 PROCEDURE — P9047: CPT

## 2018-10-04 PROCEDURE — 85379 FIBRIN DEGRADATION QUANT: CPT

## 2018-10-04 PROCEDURE — 85730 THROMBOPLASTIN TIME PARTIAL: CPT

## 2018-10-04 PROCEDURE — 49083 ABD PARACENTESIS W/IMAGING: CPT

## 2018-10-04 PROCEDURE — 82435 ASSAY OF BLOOD CHLORIDE: CPT

## 2018-10-04 PROCEDURE — 84157 ASSAY OF PROTEIN OTHER: CPT

## 2018-10-04 PROCEDURE — 76705 ECHO EXAM OF ABDOMEN: CPT

## 2018-10-04 PROCEDURE — 82042 OTHER SOURCE ALBUMIN QUAN EA: CPT

## 2018-10-04 PROCEDURE — 89051 BODY FLUID CELL COUNT: CPT

## 2018-10-04 PROCEDURE — 87798 DETECT AGENT NOS DNA AMP: CPT

## 2018-10-04 PROCEDURE — 36600 WITHDRAWAL OF ARTERIAL BLOOD: CPT

## 2018-10-04 PROCEDURE — 83615 LACTATE (LD) (LDH) ENZYME: CPT

## 2018-10-04 PROCEDURE — 87581 M.PNEUMON DNA AMP PROBE: CPT

## 2018-10-04 PROCEDURE — 97161 PT EVAL LOW COMPLEX 20 MIN: CPT

## 2018-10-04 PROCEDURE — 88305 TISSUE EXAM BY PATHOLOGIST: CPT

## 2018-10-04 PROCEDURE — 87070 CULTURE OTHR SPECIMN AEROBIC: CPT

## 2018-10-04 PROCEDURE — 93005 ELECTROCARDIOGRAM TRACING: CPT

## 2018-10-06 LAB — NON-GYNECOLOGICAL CYTOLOGY STUDY: SIGNIFICANT CHANGE UP

## 2018-10-07 LAB
CORTICOSTEROID BINDING GLOBULIN RESULT: 1.1 MG/DL — LOW
CORTIS F/TOTAL MFR SERPL: 92 % — SIGNIFICANT CHANGE UP
CORTIS SERPL-MCNC: 99 UG/DL — HIGH
CORTISOL, FREE RESULT: 91 UG/DL — HIGH
CULTURE RESULTS: SIGNIFICANT CHANGE UP
SPECIMEN SOURCE: SIGNIFICANT CHANGE UP

## 2018-10-09 PROBLEM — C18.9 ADENOCARCINOMA OF COLON: Status: ACTIVE | Noted: 2018-10-09

## 2018-10-09 RX ORDER — HYDROCODONE BITARTRATE AND HOMATROPINE METHYLBROMIDE 5; 1.5 MG/5ML; MG/5ML
5-1.5 SYRUP ORAL EVERY 6 HOURS
Qty: 240 | Refills: 0 | Status: ACTIVE | COMMUNITY
Start: 2018-08-01

## 2018-10-11 ENCOUNTER — APPOINTMENT (OUTPATIENT)
Dept: INFUSION THERAPY | Facility: HOSPITAL | Age: 68
End: 2018-10-11

## 2018-10-13 ENCOUNTER — APPOINTMENT (OUTPATIENT)
Dept: INFUSION THERAPY | Facility: HOSPITAL | Age: 68
End: 2018-10-13

## 2018-10-31 LAB
CULTURE RESULTS: SIGNIFICANT CHANGE UP
SPECIMEN SOURCE: SIGNIFICANT CHANGE UP

## 2018-11-21 LAB
CULTURE RESULTS: SIGNIFICANT CHANGE UP
SPECIMEN SOURCE: SIGNIFICANT CHANGE UP

## 2018-12-13 LAB
ALBUMIN SERPL ELPH-MCNC: 2.6 G/DL
ALP BLD-CCNC: 246 U/L
ALT SERPL-CCNC: 53 U/L
ANION GAP SERPL CALC-SCNC: 14 MMOL/L
AST SERPL-CCNC: 146 U/L
BILIRUB SERPL-MCNC: 0.5 MG/DL
BUN SERPL-MCNC: 13 MG/DL
CALCIUM SERPL-MCNC: 8 MG/DL
CEA SERPL-MCNC: 6.8 NG/ML
CHLORIDE SERPL-SCNC: 105 MMOL/L
CO2 SERPL-SCNC: 22 MMOL/L
CREAT SERPL-MCNC: 0.77 MG/DL
GLUCOSE SERPL-MCNC: 119 MG/DL
POTASSIUM SERPL-SCNC: 4.3 MMOL/L
PROT SERPL-MCNC: 5.3 G/DL
SODIUM SERPL-SCNC: 141 MMOL/L

## 2019-10-05 NOTE — PROGRESS NOTE ADULT - PROBLEM SELECTOR PROBLEM 6
Referral to orthopedic doctor for knee pain - Dr. Isaias Richard  Call for appointment:  Address: [address] [place] [address] [address], [place] [address]  Phone: [phone]      Ask Dr. Yates if you had a diabetic eye exam done at your apt next week    Take your Carvedilol twice daily and return in 1 month for blood pressure check         Malignant neoplasm of colon, unspecified part of colon

## 2020-04-02 PROBLEM — R18.8 ASCITES: Status: ACTIVE | Noted: 2018-09-11

## 2020-12-16 PROBLEM — J06.9 URI WITH COUGH AND CONGESTION: Status: RESOLVED | Noted: 2018-04-20 | Resolved: 2020-12-16

## 2023-02-06 NOTE — DIETITIAN INITIAL EVALUATION ADULT. - NS FNS WEIGHT USED FOR CALC
The patient is Watcher - Medium risk of patient condition declining or worsening         Progress made toward(s) clinical / shift goals:  Pt pleasant, cooperative on admission.  Able to participate in development of plan of Care.  Will continue to monitor progress of meeting goals throughout admission.    Patient is not progressing towards the following goals:None       current

## 2023-02-17 NOTE — PROGRESS NOTE ADULT - ASSESSMENT
Patient PT 27.9 and INR 2.6 TAKING 5MG DAILY AND 7.5MG SAT AND SUN.  NO CHANGES 68M pmh metastatic colon ca (currently on chemo, last was 2 weeks ago), HTN admitted for sepsis, now found to be bacteremic with GPR. Good (>75%) 68M pmh metastatic colon ca (currently on chemo, last was 2 weeks ago), HTN admitted for sepsis, now found to be bacteremic with listeria.

## 2023-09-13 NOTE — H&P ADULT - PROBLEM SELECTOR PROBLEM 9
Is the patient currently in the state of [place]? YES    Visit mode:VIDEO    If the visit is dropped, the patient can be reconnected by: VIDEO VISIT: Text to cell phone:   Telephone Information:   Mobile [phone]       Will anyone else be joining the visit? NO  (If patient encounters technical issues they should call [phone]    How would you like to obtain your AVS? MyChart    Are changes needed to the allergy or medication list? No    Reason for visit: Consult    Eve MERCEDES    
Medication management

## 2024-03-28 NOTE — PROGRESS NOTE ADULT - PROBLEM SELECTOR PLAN 4
Patient with complain of chronic pain in the R hip joint, diagnosed with OA, s/p R THR posterior. Shock liver vs mets to liver vs [facility]  -CT A/P showing worsening hepatic metastatic disease   -RUQ US showing PVT, started lovenox 140mg daily With transaminitis  - RUQ US showing PVT, on lovenox 140mg daily  - monitor for bleeding

## 2025-01-02 NOTE — PROVIDER CONTACT NOTE (CRITICAL VALUE NOTIFICATION) - ACTION/TREATMENT ORDERED:
Adal Bates has an upcoming lab appointment:    Future Appointments   Date Time Provider Department Center   1/3/2025  1:45 PM [facility]   3/19/2025 12:00 AM MART[facility] PSA [facility]     Patient is scheduled for the following lab(s): urine    There is no order available. Please review and place either future orders or HMPO (Review of Health Maintenance Protocol Orders), as appropriate.    There are no preventive care reminders to display for this patient.  Ruthie Childs     MD made aware.  No immediate intervention ordered